# Patient Record
Sex: FEMALE | Race: WHITE | NOT HISPANIC OR LATINO | Employment: OTHER | URBAN - METROPOLITAN AREA
[De-identification: names, ages, dates, MRNs, and addresses within clinical notes are randomized per-mention and may not be internally consistent; named-entity substitution may affect disease eponyms.]

---

## 2017-10-04 ENCOUNTER — OFFICE VISIT (OUTPATIENT)
Dept: CARDIOLOGY | Facility: CLINIC | Age: 72
End: 2017-10-04

## 2017-10-04 VITALS
DIASTOLIC BLOOD PRESSURE: 86 MMHG | WEIGHT: 181 LBS | BODY MASS INDEX: 30.9 KG/M2 | HEIGHT: 64 IN | HEART RATE: 71 BPM | SYSTOLIC BLOOD PRESSURE: 152 MMHG

## 2017-10-04 DIAGNOSIS — R06.02 SHORTNESS OF BREATH: ICD-10-CM

## 2017-10-04 DIAGNOSIS — I47.1 SVT (SUPRAVENTRICULAR TACHYCARDIA) (HCC): ICD-10-CM

## 2017-10-04 DIAGNOSIS — I49.3 PVC (PREMATURE VENTRICULAR CONTRACTION): Primary | ICD-10-CM

## 2017-10-04 DIAGNOSIS — R00.2 PALPITATIONS: ICD-10-CM

## 2017-10-04 PROCEDURE — 99204 OFFICE O/P NEW MOD 45 MIN: CPT | Performed by: INTERNAL MEDICINE

## 2017-10-04 RX ORDER — DICLOFENAC SODIUM 75 MG/1
75 TABLET, DELAYED RELEASE ORAL
COMMUNITY
End: 2019-08-23 | Stop reason: SDUPTHER

## 2017-10-04 RX ORDER — FLUTICASONE PROPIONATE 50 MCG
2 SPRAY, SUSPENSION (ML) NASAL
COMMUNITY
Start: 2013-02-22 | End: 2019-08-23 | Stop reason: SDUPTHER

## 2017-10-04 RX ORDER — LEVOTHYROXINE SODIUM 0.07 MG/1
75 TABLET ORAL
COMMUNITY
End: 2019-08-23 | Stop reason: SDUPTHER

## 2017-10-04 RX ORDER — CARVEDILOL 6.25 MG/1
6.25 TABLET ORAL
COMMUNITY
Start: 2016-05-27 | End: 2017-10-04 | Stop reason: SINTOL

## 2017-10-04 RX ORDER — FUROSEMIDE 20 MG/1
20 TABLET ORAL DAILY
COMMUNITY
End: 2019-08-23 | Stop reason: SDUPTHER

## 2017-10-19 PROCEDURE — 93000 ELECTROCARDIOGRAM COMPLETE: CPT | Performed by: INTERNAL MEDICINE

## 2017-10-19 NOTE — PROGRESS NOTES
Subjective:     Encounter Date:10/04/2017      Patient ID: Hillary Crenshaw is a 72 y.o. female.    Chief Complaint: dyspnea, SVT, PVC    History of Present Illness    Dear Dr. Muñoz,     I had the pleasure of seeing this patient in cardiac evaluation today.  As you well know, she is a adeel 72-year-old woman with history of hypothyroidism, hyperlipidemia, and hypertension.  She comes for evaluation for supraventricular tachycardia. The patient states that she has had episodic heart racing that can occur out of the blue. She was diagnosed with supraventricular tachycardia and was treated with carvedilol.  A Holter performed last year demonstrated frequent PVC's occurring 22% of the time. There were also some short runs of ventricular tachycardia and of SVT.     She is dyspneic all the time.  She has episodes of near syncope and syncope.  She has some lower extremity edema.  She suffers from sleep apnea.         Review of Systems   All other systems reviewed and are negative.    Family History   Problem Relation Age of Onset   • Breast cancer Mother    • Heart attack Father    • Alcohol abuse Sister    • Arthritis Sister    • Diabetes Sister    • Hyperlipidemia Sister    • Osteoporosis Sister    • Thyroid disease Sister    • Alcohol abuse Brother    • Hypertension Brother    • Hyperlipidemia Brother    • Lung cancer Brother    • Heart attack Brother    • Bipolar disorder Maternal Grandmother    • Breast cancer Maternal Grandmother    • Depression Maternal Grandmother    • Other Other      cardiac disorder, neoplasm of breast     Social History   Substance Use Topics   • Smoking status: Former Smoker   • Smokeless tobacco: None   • Alcohol use Yes      Comment: 7 drinks week         ECG 12 Lead  Date/Time: 10/19/2017 9:40 AM  Performed by: FRANCISCA SELLERS  Authorized by: FRANCISCA SELLERS   Comparison: compared with previous ECG   Similar to previous ECG  Rhythm: sinus rhythm  Ectopy: bigeminy  BPM: 71                  Objective:     Physical Exam   Constitutional: She is oriented to person, place, and time. She appears well-developed and well-nourished.   HENT:   Head: Normocephalic and atraumatic.   Neck: Normal range of motion. Neck supple.   Cardiovascular: Normal rate, regular rhythm and normal heart sounds.    Pulmonary/Chest: Effort normal and breath sounds normal.   Abdominal: Soft. Bowel sounds are normal.   Musculoskeletal: Normal range of motion.   Neurological: She is alert and oriented to person, place, and time.   Skin: Skin is warm and dry.   Psychiatric: She has a normal mood and affect. Her behavior is normal. Thought content normal.   Vitals reviewed.      Lab Review:       Assessment:          Diagnosis Plan   1. PVC (premature ventricular contraction)  Adult Transthoracic Echo Complete W/ Cont if Necessary Per Protocol    Stress Test With Myocardial Perfusion One Day   2. SVT (supraventricular tachycardia)  Adult Transthoracic Echo Complete W/ Cont if Necessary Per Protocol    Stress Test With Myocardial Perfusion One Day   3. Palpitations  Adult Transthoracic Echo Complete W/ Cont if Necessary Per Protocol    Stress Test With Myocardial Perfusion One Day   4. Shortness of breath  Adult Transthoracic Echo Complete W/ Cont if Necessary Per Protocol    Stress Test With Myocardial Perfusion One Day          Plan:       It was a pleasure to see your patient in cardiac evaluation today.  She is a adeel 72-year-old woman with very frequent premature ventricular contractions as well as some ventricular tachycardia and SVT.  She has palpitations that bother her.  She has significant dyspnea and some episodes of presyncope and/or syncope.      In order to evaluate her cardiac structure I have recommended an echocardiogram as well as a pharmacologic nuclear stress test.  Once I get a sense whether this is purely an arrhythmic problem versus a structural cardiac problem, we can decide on the best course of action.  She  will see me again in one month.

## 2017-10-25 ENCOUNTER — HOSPITAL ENCOUNTER (OUTPATIENT)
Dept: CARDIOLOGY | Facility: HOSPITAL | Age: 72
Discharge: HOME | End: 2017-10-25
Attending: INTERNAL MEDICINE | Admitting: INTERNAL MEDICINE

## 2017-10-25 ENCOUNTER — HOSPITAL ENCOUNTER (OUTPATIENT)
Dept: CARDIOLOGY | Facility: HOSPITAL | Age: 72
Discharge: HOME | End: 2017-10-25
Attending: INTERNAL MEDICINE

## 2017-10-25 VITALS
HEART RATE: 64 BPM | WEIGHT: 180 LBS | BODY MASS INDEX: 30.73 KG/M2 | OXYGEN SATURATION: 95 % | HEIGHT: 64 IN | SYSTOLIC BLOOD PRESSURE: 156 MMHG | DIASTOLIC BLOOD PRESSURE: 70 MMHG

## 2017-10-25 DIAGNOSIS — I49.3 PVC (PREMATURE VENTRICULAR CONTRACTION): ICD-10-CM

## 2017-10-25 DIAGNOSIS — R06.02 SHORTNESS OF BREATH: ICD-10-CM

## 2017-10-25 DIAGNOSIS — R00.2 PALPITATIONS: ICD-10-CM

## 2017-10-25 DIAGNOSIS — I47.1 SVT (SUPRAVENTRICULAR TACHYCARDIA) (HCC): ICD-10-CM

## 2017-10-25 LAB
ASCENDING AORTA: 3 CM
BH CV ECHO MEAS - ACS: 2.2 CM
BH CV ECHO MEAS - AO MAX PG (FULL): 3.9 MMHG
BH CV ECHO MEAS - AO MAX PG: 5.8 MMHG
BH CV ECHO MEAS - AO MEAN PG (FULL): 1.6 MMHG
BH CV ECHO MEAS - AO MEAN PG: 2.6 MMHG
BH CV ECHO MEAS - AO ROOT AREA (BSA CORRECTED): 1.7
BH CV ECHO MEAS - AO ROOT AREA: 7.5 CM^2
BH CV ECHO MEAS - AO ROOT DIAM: 3.1 CM
BH CV ECHO MEAS - AO V2 MAX: 120.5 CM/SEC
BH CV ECHO MEAS - AO V2 MEAN: 70.4 CM/SEC
BH CV ECHO MEAS - AO V2 VTI: 25.6 CM
BH CV ECHO MEAS - AVA(I,A): 2.3 CM^2
BH CV ECHO MEAS - AVA(I,D): 2.3 CM^2
BH CV ECHO MEAS - AVA(V,A): 1.8 CM^2
BH CV ECHO MEAS - AVA(V,D): 1.8 CM^2
BH CV ECHO MEAS - BSA(HAYCOCK): 1.9 M^2
BH CV ECHO MEAS - BSA: 1.9 M^2
BH CV ECHO MEAS - BZI_BMI: 30.9 KILOGRAMS/M^2
BH CV ECHO MEAS - BZI_METRIC_HEIGHT: 162.6 CM
BH CV ECHO MEAS - BZI_METRIC_WEIGHT: 81.6 KG
BH CV ECHO MEAS - CONTRAST EF (2CH): 55.6 ML/M^2
BH CV ECHO MEAS - CONTRAST EF 4CH: 62.2 ML/M^2
BH CV ECHO MEAS - EDV(MOD-SP2): 72 ML
BH CV ECHO MEAS - EDV(MOD-SP4): 98 ML
BH CV ECHO MEAS - EDV(TEICH): 111.1 ML
BH CV ECHO MEAS - EF(CUBED): 71 %
BH CV ECHO MEAS - EF(MOD-SP2): 55.6 %
BH CV ECHO MEAS - EF(MOD-SP4): 62.2 %
BH CV ECHO MEAS - EF(TEICH): 62.5 %
BH CV ECHO MEAS - ESV(MOD-SP2): 32 ML
BH CV ECHO MEAS - ESV(MOD-SP4): 37 ML
BH CV ECHO MEAS - ESV(TEICH): 41.6 ML
BH CV ECHO MEAS - FS: 33.8 %
BH CV ECHO MEAS - IVS/LVPW: 1
BH CV ECHO MEAS - IVSD: 0.97 CM
BH CV ECHO MEAS - LAT PEAK E' VEL: 10 CM/SEC
BH CV ECHO MEAS - LV DIASTOLIC VOL/BSA (35-75): 52.4 ML/M^2
BH CV ECHO MEAS - LV MASS(C)D: 162.2 GRAMS
BH CV ECHO MEAS - LV MASS(C)DI: 86.7 GRAMS/M^2
BH CV ECHO MEAS - LV MAX PG: 1.9 MMHG
BH CV ECHO MEAS - LV MEAN PG: 1 MMHG
BH CV ECHO MEAS - LV SYSTOLIC VOL/BSA (12-30): 19.8 ML/M^2
BH CV ECHO MEAS - LV V1 MAX: 69.4 CM/SEC
BH CV ECHO MEAS - LV V1 MEAN: 47 CM/SEC
BH CV ECHO MEAS - LV V1 VTI: 19.4 CM
BH CV ECHO MEAS - LVIDD: 4.9 CM
BH CV ECHO MEAS - LVIDS: 3.2 CM
BH CV ECHO MEAS - LVLD AP2: 6.7 CM
BH CV ECHO MEAS - LVLD AP4: 6.9 CM
BH CV ECHO MEAS - LVLS AP2: 5.8 CM
BH CV ECHO MEAS - LVLS AP4: 5.6 CM
BH CV ECHO MEAS - LVOT AREA (M): 3.1 CM^2
BH CV ECHO MEAS - LVOT AREA: 3.1 CM^2
BH CV ECHO MEAS - LVOT DIAM: 2 CM
BH CV ECHO MEAS - LVPWD: 0.93 CM
BH CV ECHO MEAS - MED PEAK E' VEL: 6 CM/SEC
BH CV ECHO MEAS - MR MAX PG: 80.1 MMHG
BH CV ECHO MEAS - MR MAX VEL: 447.4 CM/SEC
BH CV ECHO MEAS - MV A DUR: 0.18 SEC
BH CV ECHO MEAS - MV A MAX VEL: 82 CM/SEC
BH CV ECHO MEAS - MV DEC SLOPE: 289.9 CM/SEC^2
BH CV ECHO MEAS - MV DEC TIME: 0.21 SEC
BH CV ECHO MEAS - MV E MAX VEL: 62.6 CM/SEC
BH CV ECHO MEAS - MV E/A: 0.76
BH CV ECHO MEAS - MV MAX PG: 2.5 MMHG
BH CV ECHO MEAS - MV MEAN PG: 0.74 MMHG
BH CV ECHO MEAS - MV P1/2T MAX VEL: 61.6 CM/SEC
BH CV ECHO MEAS - MV P1/2T: 62.2 MSEC
BH CV ECHO MEAS - MV V2 MAX: 78.3 CM/SEC
BH CV ECHO MEAS - MV V2 MEAN: 38.8 CM/SEC
BH CV ECHO MEAS - MV V2 VTI: 30.9 CM
BH CV ECHO MEAS - MVA P1/2T LCG: 3.6 CM^2
BH CV ECHO MEAS - MVA(P1/2T): 3.5 CM^2
BH CV ECHO MEAS - MVA(VTI): 1.9 CM^2
BH CV ECHO MEAS - PA ACC TIME: 0.13 SEC
BH CV ECHO MEAS - PA MAX PG (FULL): 2.6 MMHG
BH CV ECHO MEAS - PA MAX PG: 3.7 MMHG
BH CV ECHO MEAS - PA PR(ACCEL): 22 MMHG
BH CV ECHO MEAS - PA V2 MAX: 96.5 CM/SEC
BH CV ECHO MEAS - PULM DIAS VEL: 31.7 CM/SEC
BH CV ECHO MEAS - PULM S/D: 1.2
BH CV ECHO MEAS - PULM SYS VEL: 37.7 CM/SEC
BH CV ECHO MEAS - PVA(V,A): 1.6 CM^2
BH CV ECHO MEAS - PVA(V,D): 1.6 CM^2
BH CV ECHO MEAS - QP/QS: 0.49
BH CV ECHO MEAS - RAP SYSTOLE: 3 MMHG
BH CV ECHO MEAS - RV MAX PG: 1.2 MMHG
BH CV ECHO MEAS - RV MEAN PG: 0.5 MMHG
BH CV ECHO MEAS - RV V1 MAX: 53.8 CM/SEC
BH CV ECHO MEAS - RV V1 MEAN: 31.2 CM/SEC
BH CV ECHO MEAS - RV V1 VTI: 9.9 CM
BH CV ECHO MEAS - RVOT AREA: 2.9 CM^2
BH CV ECHO MEAS - RVOT DIAM: 1.9 CM
BH CV ECHO MEAS - RVSP: 35 MMHG
BH CV ECHO MEAS - SI(AO): 102.9 ML/M^2
BH CV ECHO MEAS - SI(CUBED): 43.8 ML/M^2
BH CV ECHO MEAS - SI(LVOT): 31.7 ML/M^2
BH CV ECHO MEAS - SI(MOD-SP2): 21.4 ML/M^2
BH CV ECHO MEAS - SI(MOD-SP4): 32.6 ML/M^2
BH CV ECHO MEAS - SI(TEICH): 37.1 ML/M^2
BH CV ECHO MEAS - SUP REN AO DIAM: 1.8 CM
BH CV ECHO MEAS - SV(AO): 192.4 ML
BH CV ECHO MEAS - SV(CUBED): 81.9 ML
BH CV ECHO MEAS - SV(LVOT): 59.4 ML
BH CV ECHO MEAS - SV(MOD-SP2): 40 ML
BH CV ECHO MEAS - SV(MOD-SP4): 61 ML
BH CV ECHO MEAS - SV(RVOT): 28.8 ML
BH CV ECHO MEAS - SV(TEICH): 69.5 ML
BH CV ECHO MEAS - TAPSE (>1.6): 1.9 CM2
BH CV ECHO MEAS - TR MAX VEL: 285.1 CM/SEC
BH CV NUCLEAR PRIOR STUDY: 3
BH CV STRESS BP STAGE 1: NORMAL
BH CV STRESS COMMENTS STAGE 1: NORMAL
BH CV STRESS DOSE REGADENOSON STAGE 1: 0.4
BH CV STRESS DURATION MIN STAGE 1: 0
BH CV STRESS DURATION SEC STAGE 1: 15
BH CV STRESS HR STAGE 1: 100
BH CV STRESS PROTOCOL 1: NORMAL
BH CV STRESS RECOVERY BP: NORMAL MMHG
BH CV STRESS RECOVERY HR: 79 BPM
BH CV STRESS STAGE 1: 1
BH CV XLRA - RV BASE: 3.1 CM
BH CV XLRA - TDI S': 11 CM/SEC
E/E' RATIO: 9
LEFT ATRIUM VOLUME INDEX: 39 ML/M2
LEFT ATRIUM VOLUME: 74 CM3
LV EF 2D ECHO EST: 62 %
LV EF NUC BP: 73 %
MAXIMAL PREDICTED HEART RATE: 148 BPM
PERCENT MAX PREDICTED HR: 67.57 %
SINUS: 2.9 CM
STJ: 2.5 CM
STRESS BASELINE BP: NORMAL MMHG
STRESS BASELINE HR: 77 BPM
STRESS PERCENT HR: 79 %
STRESS POST EXERCISE DUR SEC: 15 SEC
STRESS POST PEAK BP: NORMAL MMHG
STRESS POST PEAK HR: 100 BPM
STRESS TARGET HR: 126 BPM

## 2017-10-25 PROCEDURE — 93018 CV STRESS TEST I&R ONLY: CPT | Performed by: INTERNAL MEDICINE

## 2017-10-25 PROCEDURE — 0 TECHNETIUM TETROFOSMIN KIT: Performed by: INTERNAL MEDICINE

## 2017-10-25 PROCEDURE — 25010000002 PERFLUTREN (DEFINITY) 8.476 MG IN SODIUM CHLORIDE 0.9 % 10 ML INJECTION: Performed by: INTERNAL MEDICINE

## 2017-10-25 PROCEDURE — 78452 HT MUSCLE IMAGE SPECT MULT: CPT | Performed by: INTERNAL MEDICINE

## 2017-10-25 PROCEDURE — 25010000002 REGADENOSON 0.4 MG/5ML SOLUTION: Performed by: INTERNAL MEDICINE

## 2017-10-25 PROCEDURE — A9502 TC99M TETROFOSMIN: HCPCS | Performed by: INTERNAL MEDICINE

## 2017-10-25 PROCEDURE — 93306 TTE W/DOPPLER COMPLETE: CPT | Performed by: INTERNAL MEDICINE

## 2017-10-25 PROCEDURE — 93016 CV STRESS TEST SUPVJ ONLY: CPT | Performed by: INTERNAL MEDICINE

## 2017-10-25 PROCEDURE — 78452 HT MUSCLE IMAGE SPECT MULT: CPT

## 2017-10-25 PROCEDURE — 93017 CV STRESS TEST TRACING ONLY: CPT

## 2017-10-25 PROCEDURE — 93306 TTE W/DOPPLER COMPLETE: CPT

## 2017-10-25 RX ADMIN — PERFLUTREN 1.5 ML: 6.52 INJECTION, SUSPENSION INTRAVENOUS at 10:31

## 2017-10-25 RX ADMIN — TETROFOSMIN 1 DOSE: 1.38 INJECTION, POWDER, LYOPHILIZED, FOR SOLUTION INTRAVENOUS at 10:30

## 2017-10-25 RX ADMIN — REGADENOSON 0.4 MG: 0.08 INJECTION, SOLUTION INTRAVENOUS at 11:25

## 2017-10-25 RX ADMIN — TETROFOSMIN 1 DOSE: 1.38 INJECTION, POWDER, LYOPHILIZED, FOR SOLUTION INTRAVENOUS at 11:25

## 2017-11-01 ENCOUNTER — TELEPHONE (OUTPATIENT)
Dept: CARDIOLOGY | Facility: CLINIC | Age: 72
End: 2017-11-01

## 2017-11-01 NOTE — TELEPHONE ENCOUNTER
Dr. Caraballo patient calling for results of Stress & Echo done on 10/25.    Please call her at [phone].    Thank you.  Alberta

## 2017-11-01 NOTE — TELEPHONE ENCOUNTER
I informed the patient of her unremarkable stress test and echo, further discussion will be had at her appointment with Dr. Caraballo in November.

## 2017-11-08 ENCOUNTER — OFFICE VISIT (OUTPATIENT)
Dept: CARDIOLOGY | Facility: CLINIC | Age: 72
End: 2017-11-08

## 2017-11-08 VITALS
SYSTOLIC BLOOD PRESSURE: 140 MMHG | WEIGHT: 180 LBS | BODY MASS INDEX: 30.73 KG/M2 | HEART RATE: 81 BPM | HEIGHT: 64 IN | DIASTOLIC BLOOD PRESSURE: 80 MMHG

## 2017-11-08 DIAGNOSIS — I47.1 SVT (SUPRAVENTRICULAR TACHYCARDIA) (HCC): ICD-10-CM

## 2017-11-08 DIAGNOSIS — I49.3 PVC (PREMATURE VENTRICULAR CONTRACTION): ICD-10-CM

## 2017-11-08 DIAGNOSIS — R00.2 PALPITATIONS: Primary | ICD-10-CM

## 2017-11-08 PROCEDURE — 99213 OFFICE O/P EST LOW 20 MIN: CPT | Performed by: INTERNAL MEDICINE

## 2017-11-10 ENCOUNTER — TELEPHONE (OUTPATIENT)
Dept: CARDIOLOGY | Facility: CLINIC | Age: 72
End: 2017-11-10

## 2017-11-10 NOTE — TELEPHONE ENCOUNTER
Patient was seen by you on Weds, and states that you were going to send in a script for her, but the pharmacy hasn't received anything as of yet.    Your note isn't complete, so I don't know which medication she is referring to.    Please send in script or advise and I will gladly send in.    Thanks!

## 2017-11-12 PROCEDURE — 93000 ELECTROCARDIOGRAM COMPLETE: CPT | Performed by: INTERNAL MEDICINE

## 2017-11-12 RX ORDER — METOPROLOL SUCCINATE 50 MG/1
50 TABLET, EXTENDED RELEASE ORAL DAILY
Qty: 90 TABLET | Refills: 3 | Status: SHIPPED | OUTPATIENT
Start: 2017-11-12 | End: 2018-02-07 | Stop reason: ALTCHOICE

## 2017-11-12 NOTE — PROGRESS NOTES
Subjective:     Encounter Date:11/08/2017      Patient ID: Hillary Crenshaw is a 72 y.o. female.    Chief Complaint: PVC, SVT, palpitations    History of Present Illness    Dear Dr. Muñoz,     I had the pleasure of seeing this patient in cardiac follow-up today.  As you well know, she is a adeel 72-year-old woman with history of hypothyroidism, hyperlipidemia, and hypertension.  She was seen last month for SVT.  She had very frequent PVC's occurring 22% of the time.      She comes in for her 1-month follow-up. Since I have last seen her, she reports doing very well. She continues to have intermittent palpitations, but no evidence of syncope.    Her cardiac evaluation for structural heart disease was negative.  She has normal LV function and no significant structural abnormalities.  She had a normal pharmacologic nuclear stress test.     She complains of some intermittent fatigue, but otherwise no other complaints.        Review of Systems   All other systems reviewed and are negative.        ECG 12 Lead  Date/Time: 11/12/2017 4:13 PM  Performed by: FRANCISCA SELLERS  Authorized by: FRANCISCA SELLERS   Comparison: compared with previous ECG   Similar to previous ECG  Rhythm: sinus rhythm  Ectopy: frequent PVCs  BPM: 81                 Objective:     Physical Exam   Constitutional: She is oriented to person, place, and time. She appears well-developed and well-nourished.   HENT:   Head: Normocephalic and atraumatic.   Neck: Normal range of motion. Neck supple.   Cardiovascular: Normal rate and normal heart sounds.  An irregularly irregular rhythm present.   Pulmonary/Chest: Effort normal and breath sounds normal.   Abdominal: Soft. Bowel sounds are normal.   Musculoskeletal: Normal range of motion.   Neurological: She is alert and oriented to person, place, and time.   Skin: Skin is warm and dry.   Psychiatric: She has a normal mood and affect. Her behavior is normal. Thought content normal.   Vitals reviewed.      Lab  Review:       Assessment:          Diagnosis Plan   1. Palpitations     2. PVC (premature ventricular contraction)     3. SVT (supraventricular tachycardia)            Plan:       It was a pleasure to see your patient in cardiac follow-up today. She has very frequent PVC's, but no major symptoms other than fatigue.  I talked to her about multiple options for care.  I think the most conservative management would be to do nothing or to try suppressive therapy with a beta blocker. I do not think her symptoms are bad enough to warrant more aggressive therapy such as PVC ablation.    I will start her on Toprol-XL 50 mg once daily.  She will see me again in 6 months.

## 2018-01-02 ENCOUNTER — TELEPHONE (OUTPATIENT)
Dept: CARDIOLOGY | Facility: CLINIC | Age: 73
End: 2018-01-02

## 2018-01-02 NOTE — TELEPHONE ENCOUNTER
Patient called from [phone]    1.)Approximately 6 weeks ago louann was in afib and was started on metoprolol succ 50 mg daily.  She is having trouble breathing even walking up the stairs.  She is extremely short of breath.  She wanted to know if she can either decrease it or change?  She has trouble breathing with any kind of exertion.  It has not gotten any better.  She feels like she has a lot of weight around her heart.  She is a little swollen.  Denies Chest pain.  Still has the irregular heart beat, but not all the time.  Patient does not take bp and hr daily but states it stays around 127/80's.  Patient feels her quality of life is going down instead of up.    2.)She also wants to know if she is okay for hip surgery scheduled for March 12?    Thanks,  Cara

## 2018-01-02 NOTE — TELEPHONE ENCOUNTER
Spoke to patient and gave her an appointment for Feb 5.   I let her know to call if her symptoms worsen and she may be able to get in even sooner.    Cara

## 2018-02-07 ENCOUNTER — OFFICE VISIT (OUTPATIENT)
Dept: CARDIOLOGY | Facility: CLINIC | Age: 73
End: 2018-02-07

## 2018-02-07 VITALS
HEIGHT: 64 IN | WEIGHT: 180 LBS | DIASTOLIC BLOOD PRESSURE: 72 MMHG | BODY MASS INDEX: 30.73 KG/M2 | HEART RATE: 74 BPM | SYSTOLIC BLOOD PRESSURE: 132 MMHG

## 2018-02-07 DIAGNOSIS — R00.2 PALPITATIONS: Primary | ICD-10-CM

## 2018-02-07 DIAGNOSIS — I47.1 SVT (SUPRAVENTRICULAR TACHYCARDIA) (HCC): ICD-10-CM

## 2018-02-07 DIAGNOSIS — I49.3 PVC (PREMATURE VENTRICULAR CONTRACTION): ICD-10-CM

## 2018-02-07 PROCEDURE — 93000 ELECTROCARDIOGRAM COMPLETE: CPT | Performed by: INTERNAL MEDICINE

## 2018-02-07 PROCEDURE — 99213 OFFICE O/P EST LOW 20 MIN: CPT | Performed by: INTERNAL MEDICINE

## 2018-02-07 RX ORDER — DILTIAZEM HYDROCHLORIDE 240 MG/1
240 CAPSULE, COATED, EXTENDED RELEASE ORAL DAILY
Qty: 90 CAPSULE | Refills: 3 | Status: SHIPPED | OUTPATIENT
Start: 2018-02-07 | End: 2019-01-28 | Stop reason: SDUPTHER

## 2018-02-11 NOTE — PROGRESS NOTES
Subjective:     Encounter Date:02/07/2018      Patient ID: Hillary Crenshaw is a 72 y.o. female.    Chief Complaint: palpitations    History of Present Illness    Dear Dr. Muñoz,    I had the pleasure of seeing this patient in cardiac follow up today.  As you well know, she is a adeel 72-year-old woman with history of hypothyroidism and arrhythmia.  She has had SVT as well as frequent PVCs occurring about 22% of the time.  She had normal evaluation for structural heart disease including a stress test and echocardiogram.    She comes in for follow up.  She is looking at hip surgery with Dr. Kimble in early March.  She took metoprolol to suppress her PVCs, but thinks these are making her symptoms worse.        Review of Systems   All other systems reviewed and are negative.        ECG 12 Lead  Date/Time: 2/7/2018 2:36 PM  Performed by: FRANCISCA SELLERS  Authorized by: FRANCISCA SELLERS   Comparison: compared with previous ECG   Similar to previous ECG  Rhythm: sinus rhythm  Ectopy: bigeminy  BPM: 74                 Objective:     Physical Exam   Constitutional: She is oriented to person, place, and time. She appears well-developed and well-nourished.   HENT:   Head: Normocephalic and atraumatic.   Neck: Normal range of motion. Neck supple.   Cardiovascular: Normal rate and normal heart sounds.  A regularly irregular rhythm present.   Pulmonary/Chest: Effort normal and breath sounds normal.   Abdominal: Soft. Bowel sounds are normal.   Musculoskeletal: Normal range of motion.   Neurological: She is alert and oriented to person, place, and time.   Skin: Skin is warm and dry.   Psychiatric: She has a normal mood and affect. Her behavior is normal. Thought content normal.   Vitals reviewed.      Lab Review:       Assessment:          Diagnosis Plan   1. Palpitations     2. PVC (premature ventricular contraction)     3. SVT (supraventricular tachycardia)            Plan:       It was a pleasure to see your patient in  cardiac follow up today.  She is doing very well from a cardiac standpoint.  As she did not tolerate her metoprolol, I have switched her to diltiazem.  Hopefully she will have a better response to this medication.  I have urged her to try to not worry very much about this arrhythmia, as it seems benign.  Treatment should only be focused on symptom relief.    I see no problem with her proceeding with her upcoming hip surgery.

## 2018-05-14 ENCOUNTER — OFFICE VISIT (OUTPATIENT)
Dept: CARDIOLOGY | Facility: CLINIC | Age: 73
End: 2018-05-14

## 2018-05-14 VITALS
WEIGHT: 185 LBS | OXYGEN SATURATION: 99 % | HEART RATE: 60 BPM | BODY MASS INDEX: 31.58 KG/M2 | SYSTOLIC BLOOD PRESSURE: 124 MMHG | DIASTOLIC BLOOD PRESSURE: 82 MMHG | HEIGHT: 64 IN

## 2018-05-14 DIAGNOSIS — I49.3 PVC'S (PREMATURE VENTRICULAR CONTRACTIONS): Primary | ICD-10-CM

## 2018-05-14 PROBLEM — N81.11 MIDLINE CYSTOCELE: Status: ACTIVE | Noted: 2017-12-18

## 2018-05-14 PROBLEM — Z96.642 STATUS POST LEFT HIP REPLACEMENT: Status: ACTIVE | Noted: 2018-03-05

## 2018-05-14 PROBLEM — I10 HYPERTENSION: Status: ACTIVE | Noted: 2018-05-14

## 2018-05-14 PROBLEM — N81.6 RECTOCELE: Status: ACTIVE | Noted: 2017-12-18

## 2018-05-14 PROBLEM — E03.9 HYPOTHYROIDISM: Status: ACTIVE | Noted: 2018-05-14

## 2018-05-14 PROBLEM — M16.12 ARTHRITIS OF LEFT HIP: Status: ACTIVE | Noted: 2018-01-23

## 2018-05-14 PROBLEM — N81.2 INCOMPLETE UTEROVAGINAL PROLAPSE: Status: ACTIVE | Noted: 2017-12-18

## 2018-05-14 PROBLEM — N39.46 MIXED STRESS AND URGE URINARY INCONTINENCE: Status: ACTIVE | Noted: 2017-12-18

## 2018-05-14 PROBLEM — E66.9 OBESITY: Status: ACTIVE | Noted: 2017-12-18

## 2018-05-14 PROCEDURE — 99213 OFFICE O/P EST LOW 20 MIN: CPT | Performed by: PHYSICIAN ASSISTANT

## 2018-05-14 PROCEDURE — 93000 ELECTROCARDIOGRAM COMPLETE: CPT | Performed by: PHYSICIAN ASSISTANT

## 2018-05-14 NOTE — PROGRESS NOTES
Date of Office Visit: 2018  Encounter Provider: DONALD Thomson  Place of Service: [facility]  Patient Name: Hillary Crenshaw  :1945    Chief Complaint   Patient presents with   • Rapid Heart Rate     3 month follow up   • Hypertension   :     HPI: Hillary Crenshaw is a 72 y.o. female, new to me, who presents today for follow-up.  Old records have been obtained and reviewed by me.  She is a patient of Dr. Caraballo's with a past cardiac history significant for SVT and frequent PVCs.  She has been taking metoprolol to suppress her PVCs.  She has had a normal echocardiogram and a stress test in the past.  She was last in our office to see Dr. Caraballo on 2018.  At that visit, she was doing well from a cardiac standpoint.  She was not tolerating the metoprolol, and Dr. Caraballo switched her to diltiazem.  He also counseled her on the benign etiology of her PVCs.  She was facing upcoming hip surgery, and he felt it was safe for her to proceed.   She states that prior to her hip surgery in a few weeks after her hip surgery her palpitations were really bad.  However, once the pain of her hip subsided, her palpitations improved significantly.  She loves being on the Cardizem instead of the metoprolol, and states that she can hardly feel like she is on any medication at all.  She denies any chest pain, palpitations, shortness of breath, dizziness, or syncope.  She does complain of some swelling in her feet and around her ankles over the past week or so.      Past Medical History:   Diagnosis Date   • Acute allergic rhinitis    • Acute ankle pain    • Acute arthritis    • Asymptomatic PVCs    • Boil, thigh    • Chest pain    • Chronic constipation    • Essential hypertension, benign    • Gastritis    • Head ache    • Hyperlipidemia    • Hypervitaminosis D    • Hypothyroidism    • Osteoarthritis     left hip   • Osteopenia    • Palpitations    • Pessary maintenance    •  Post-menopause    • Prolapse of female bladder, acquired    • PSVT (paroxysmal supraventricular tachycardia)    • Right foot pain    • SOB (shortness of breath)    • Visit for screening mammogram        Past Surgical History:   Procedure Laterality Date   • FOOT SURGERY         Social History     Social History   • Marital status:      Spouse name: N/A   • Number of children: N/A   • Years of education: N/A     Occupational History   • Not on file.     Social History Main Topics   • Smoking status: Former Smoker   • Smokeless tobacco: Never Used   • Alcohol use 0.6 oz/week     1 Glasses of wine per week      Comment: 7 drinks week   • Drug use: No   • Sexual activity: Defer     Other Topics Concern   • Not on file     Social History Narrative   • No narrative on file       Family History   Problem Relation Age of Onset   • Breast cancer Mother    • Heart attack Father    • Alcohol abuse Sister    • Arthritis Sister    • Diabetes Sister    • Hyperlipidemia Sister    • Osteoporosis Sister    • Thyroid disease Sister    • Alcohol abuse Brother    • Hypertension Brother    • Hyperlipidemia Brother    • Lung cancer Brother    • Heart attack Brother    • Bipolar disorder Maternal Grandmother    • Breast cancer Maternal Grandmother    • Depression Maternal Grandmother    • Other Other      cardiac disorder, neoplasm of breast   • No Known Problems Maternal Grandfather    • No Known Problems Paternal Grandmother    • No Known Problems Paternal Grandfather        Review of Systems   Constitution: Negative for chills, fever and malaise/fatigue.   Cardiovascular: Positive for leg swelling. Negative for chest pain, dyspnea on exertion, near-syncope, orthopnea, palpitations, paroxysmal nocturnal dyspnea and syncope.   Respiratory: Negative for cough and shortness of breath.    Musculoskeletal: Negative for joint pain, joint swelling and myalgias.   Gastrointestinal: Negative for abdominal pain, diarrhea, melena, nausea  "and vomiting.   Genitourinary: Negative for frequency and hematuria.   Neurological: Negative for light-headedness, numbness, paresthesias and seizures.   Allergic/Immunologic: Negative.    All other systems reviewed and are negative.      No Known Allergies      Current Outpatient Prescriptions:   •  aspirin 81 MG tablet, Take 81 mg by mouth., Disp: , Rfl:   •  diclofenac (VOLTAREN) 75 MG EC tablet, Take 75 mg by mouth., Disp: , Rfl:   •  diltiaZEM CD (CARDIZEM CD) 240 MG 24 hr capsule, Take 1 capsule by mouth Daily., Disp: 90 capsule, Rfl: 3  •  fluticasone (FLONASE) 50 MCG/ACT nasal spray, 2 sprays into each nostril., Disp: , Rfl:   •  furosemide (LASIX) 20 MG tablet, Take 20 mg by mouth Daily., Disp: , Rfl:   •  levothyroxine (SYNTHROID, LEVOTHROID) 75 MCG tablet, Take 75 mcg by mouth., Disp: , Rfl:       Objective:     Vitals:    05/14/18 1351 05/14/18 1414   BP: 124/70 124/82   BP Location: Right arm Left arm   Pulse: 60    SpO2: 99%    Weight: 83.9 kg (185 lb)    Height: 162.6 cm (64\")      Body mass index is 31.76 kg/m².    PHYSICAL EXAM:    Physical Exam   Constitutional: She is oriented to person, place, and time. She appears well-developed and well-nourished. No distress.   HENT:   Head: Normocephalic and atraumatic.   Eyes: Pupils are equal, round, and reactive to light.   Neck: No JVD present. No thyromegaly present.   Cardiovascular: Normal rate, regular rhythm, normal heart sounds and intact distal pulses.    No murmur heard.  1+ bilateral lower extremity edema.   Pulmonary/Chest: Effort normal and breath sounds normal. No respiratory distress.   Abdominal: Soft. Bowel sounds are normal. She exhibits no distension. There is no splenomegaly or hepatomegaly. There is no tenderness.   Musculoskeletal: Normal range of motion. She exhibits no edema.   Neurological: She is alert and oriented to person, place, and time.   Skin: Skin is warm and dry. She is not diaphoretic. No erythema.   Psychiatric: She has " a normal mood and affect. Her behavior is normal. Judgment normal.         ECG 12 Lead  Date/Time: 5/14/2018 2:23 PM  Performed by: NAIN MILLS.  Authorized by: NAIN MILLS   Comparison: compared with previous ECG from 2/7/2018  Similar to previous ECG  Rhythm: sinus rhythm  Ectopy: frequent PVCs  BPM: 60  Clinical impression: abnormal ECG  Comments: Indication: PVCs.              Assessment:       Diagnosis Plan   1. PVC's (premature ventricular contractions)  ECG 12 Lead     Orders Placed This Encounter   Procedures   • ECG 12 Lead     This order was created via procedure documentation          Plan:       Overall she stable and doing well from a cardiac standpoint.  She has had an unremarkable cardiac workup in the past, and seems to be tolerating the Cardizem much better than the metoprolol.  She is no longer bothered by her PVCs.  Her only real complaint today is leg swelling.  I advised her to continue to watch her sodium intake.  She has no calf tenderness and is very active, I am at a low suspicion for DVT.  I'm not make any changes to her medical regimen, and she will follow-up with Dr. Caraballo in 1 year or sooner if needed.    As always, it has been a pleasure to participate in your patient's care.      Sincerely,         Nain Mills PA-C

## 2018-10-08 ENCOUNTER — TELEPHONE (OUTPATIENT)
Dept: CARDIOLOGY | Facility: CLINIC | Age: 73
End: 2018-10-08

## 2018-10-08 NOTE — TELEPHONE ENCOUNTER
Patient would like to speak with you directly, about her current cardiac status, and whether or not it is ok for her to have surgery.  She has tumors behind uterus, and is currently scheduled for surgery on 10/22 with Dr. Arash King at [facility].    She is concerned because she also had hip surgery back in March.    Please call her at [phone] to discuss.    Thank you!      *Patient is aware that Dr. Caraballo will not be back in the office until 10/10 and he will call her then.

## 2018-10-10 NOTE — TELEPHONE ENCOUNTER
Dr. Caraballo called and spoke at length with patient.  He has cleared her for this surgery, and if they request it, she may hold aspirin 5 days prior.

## 2019-01-28 RX ORDER — DILTIAZEM HYDROCHLORIDE 240 MG/1
CAPSULE, EXTENDED RELEASE ORAL
Qty: 90 CAPSULE | Refills: 2 | Status: SHIPPED | OUTPATIENT
Start: 2019-01-28 | End: 2019-08-23 | Stop reason: SDUPTHER

## 2019-05-15 ENCOUNTER — OFFICE VISIT (OUTPATIENT)
Dept: CARDIOLOGY | Facility: CLINIC | Age: 74
End: 2019-05-15

## 2019-05-15 VITALS
DIASTOLIC BLOOD PRESSURE: 74 MMHG | SYSTOLIC BLOOD PRESSURE: 134 MMHG | WEIGHT: 190 LBS | BODY MASS INDEX: 32.44 KG/M2 | HEIGHT: 64 IN | HEART RATE: 62 BPM

## 2019-05-15 DIAGNOSIS — I47.1 SVT (SUPRAVENTRICULAR TACHYCARDIA) (HCC): ICD-10-CM

## 2019-05-15 DIAGNOSIS — I49.3 PVC'S (PREMATURE VENTRICULAR CONTRACTIONS): ICD-10-CM

## 2019-05-15 DIAGNOSIS — R09.89 CAROTID BRUIT, UNSPECIFIED LATERALITY: Primary | ICD-10-CM

## 2019-05-15 PROCEDURE — 99213 OFFICE O/P EST LOW 20 MIN: CPT | Performed by: INTERNAL MEDICINE

## 2019-05-15 PROCEDURE — 93000 ELECTROCARDIOGRAM COMPLETE: CPT | Performed by: INTERNAL MEDICINE

## 2019-05-15 NOTE — PROGRESS NOTES
Subjective:     Encounter Date:05/15/2019      Patient ID: Hillary Crenshaw is a 73 y.o. female.    Chief Complaint: SVT, PVC, carotid artery bruit    History of Present Illness    Dear Dr. Muñoz,     I had the pleasure of seeing your patient in cardiac follow-up today.  As you well know, she is a adeel, 73-year-old woman with history of SVT and PVCs.  She was previously on metoprolol, but did not tolerate it.  She changed to diltiazem which she likes much better.  She continues to have arrhythmia, but she says she does not feel it very much.        Her  has developed dementia.  She is very stressed about this.  She is afraid of having a stroke.  She has not been exercising as much as she used to and has been gaining weight.            Review of Systems   All other systems reviewed and are negative.        ECG 12 Lead  Date/Time: 5/15/2019 10:23 AM  Performed by: Phuc Caraballo MD  Authorized by: Phuc Caraballo MD   Comparison: compared with previous ECG   Similar to previous ECG  Rhythm: sinus rhythm  Ectopy: trigeminy  BPM: 62                 Objective:     Physical Exam   Constitutional: She is oriented to person, place, and time. She appears well-developed and well-nourished.   HENT:   Head: Normocephalic and atraumatic.   Neck: Normal range of motion. Neck supple.   Cardiovascular: Normal rate, regular rhythm and normal heart sounds.   Pulmonary/Chest: Effort normal and breath sounds normal.   Abdominal: Soft. Bowel sounds are normal.   Musculoskeletal: Normal range of motion.   Neurological: She is alert and oriented to person, place, and time.   Skin: Skin is warm and dry.   Psychiatric: She has a normal mood and affect. Her behavior is normal. Thought content normal.   Vitals reviewed.      Lab Review:       Assessment:          Diagnosis Plan   1. Carotid bruit, unspecified laterality  Duplex Carotid Ultrasound CAR   2. PVC's (premature ventricular contractions)     3. SVT (supraventricular  tachycardia) (CMS/[facility])            Plan:       It was a pleasure to see your patient in cardiac followup today.  She is doing well from the cardiac standpoint without any complaints.  She is under a lot of stress due to her 's dementia.  She has a small carotid bruit so we are going to check a carotid Doppler study.  She will see us again in one year or sooner if symptoms warrant.

## 2019-06-03 ENCOUNTER — HOSPITAL ENCOUNTER (OUTPATIENT)
Dept: CARDIOLOGY | Facility: HOSPITAL | Age: 74
Discharge: HOME | End: 2019-06-03
Admitting: INTERNAL MEDICINE

## 2019-06-03 DIAGNOSIS — R09.89 CAROTID BRUIT, UNSPECIFIED LATERALITY: ICD-10-CM

## 2019-06-03 LAB
BH CV XLRA MEAS LEFT DIST CCA EDV: -12.6 CM/SEC
BH CV XLRA MEAS LEFT DIST CCA PSV: -49.7 CM/SEC
BH CV XLRA MEAS LEFT DIST ICA EDV: -30.1 CM/SEC
BH CV XLRA MEAS LEFT DIST ICA PSV: -83.4 CM/SEC
BH CV XLRA MEAS LEFT ICA/CCA RATIO: 1.7
BH CV XLRA MEAS LEFT MID CCA EDV: -18.9 CM/SEC
BH CV XLRA MEAS LEFT MID CCA PSV: -86.2 CM/SEC
BH CV XLRA MEAS LEFT MID ICA EDV: -28 CM/SEC
BH CV XLRA MEAS LEFT MID ICA PSV: -73.6 CM/SEC
BH CV XLRA MEAS LEFT PROX CCA EDV: 14.2 CM/SEC
BH CV XLRA MEAS LEFT PROX CCA PSV: 71.6 CM/SEC
BH CV XLRA MEAS LEFT PROX ECA PSV: -52.8 CM/SEC
BH CV XLRA MEAS LEFT PROX ICA EDV: -11.8 CM/SEC
BH CV XLRA MEAS LEFT PROX ICA PSV: -30.2 CM/SEC
BH CV XLRA MEAS LEFT PROX SCLA PSV: 83.4 CM/SEC
BH CV XLRA MEAS LEFT VERTEBRAL A PSV: -35.4 CM/SEC
BH CV XLRA MEAS RIGHT DIST CCA EDV: 18.6 CM/SEC
BH CV XLRA MEAS RIGHT DIST CCA PSV: 53.4 CM/SEC
BH CV XLRA MEAS RIGHT DIST ICA EDV: -24.6 CM/SEC
BH CV XLRA MEAS RIGHT DIST ICA PSV: -66.4 CM/SEC
BH CV XLRA MEAS RIGHT ICA/CCA RATIO: 1.2
BH CV XLRA MEAS RIGHT MID CCA EDV: 20.9 CM/SEC
BH CV XLRA MEAS RIGHT MID CCA PSV: 87.8 CM/SEC
BH CV XLRA MEAS RIGHT MID ICA EDV: -18.1 CM/SEC
BH CV XLRA MEAS RIGHT MID ICA PSV: -64.1 CM/SEC
BH CV XLRA MEAS RIGHT PROX CCA EDV: -20.1 CM/SEC
BH CV XLRA MEAS RIGHT PROX CCA PSV: -83.8 CM/SEC
BH CV XLRA MEAS RIGHT PROX ECA PSV: -60.3 CM/SEC
BH CV XLRA MEAS RIGHT PROX ICA EDV: -12.5 CM/SEC
BH CV XLRA MEAS RIGHT PROX ICA PSV: -35.3 CM/SEC
BH CV XLRA MEAS RIGHT PROX SCLA PSV: 40.8 CM/SEC
BH CV XLRA MEAS RIGHT VERTEBRAL A PSV: -42.3 CM/SEC

## 2019-06-03 PROCEDURE — 93880 EXTRACRANIAL BILAT STUDY: CPT | Performed by: INTERNAL MEDICINE

## 2019-06-03 PROCEDURE — 93880 EXTRACRANIAL BILAT STUDY: CPT

## 2019-06-13 ENCOUNTER — TELEPHONE (OUTPATIENT)
Dept: CARDIOLOGY | Facility: CLINIC | Age: 74
End: 2019-06-13

## 2019-06-13 NOTE — TELEPHONE ENCOUNTER
Pt called wanting the results of her carotid dopper done on 6/3/19. It is in [facility] and she can be reached at #[phone].    Thanks,  Kate

## 2019-07-22 ENCOUNTER — TELEPHONE (OUTPATIENT)
Dept: FAMILY MEDICINE CLINIC | Facility: CLINIC | Age: 74
End: 2019-07-22

## 2019-07-25 ENCOUNTER — TELEPHONE (OUTPATIENT)
Dept: FAMILY MEDICINE CLINIC | Facility: CLINIC | Age: 74
End: 2019-07-25

## 2019-08-23 ENCOUNTER — OFFICE VISIT (OUTPATIENT)
Dept: FAMILY MEDICINE CLINIC | Facility: CLINIC | Age: 74
End: 2019-08-23

## 2019-08-23 VITALS
BODY MASS INDEX: 31.99 KG/M2 | HEIGHT: 64 IN | TEMPERATURE: 97.3 F | HEART RATE: 58 BPM | DIASTOLIC BLOOD PRESSURE: 72 MMHG | SYSTOLIC BLOOD PRESSURE: 112 MMHG | WEIGHT: 187.4 LBS | OXYGEN SATURATION: 98 %

## 2019-08-23 DIAGNOSIS — I10 ESSENTIAL HYPERTENSION: Primary | ICD-10-CM

## 2019-08-23 DIAGNOSIS — E03.9 ACQUIRED HYPOTHYROIDISM: ICD-10-CM

## 2019-08-23 DIAGNOSIS — M16.12 ARTHRITIS OF LEFT HIP: ICD-10-CM

## 2019-08-23 DIAGNOSIS — J30.2 SEASONAL ALLERGIC RHINITIS, UNSPECIFIED TRIGGER: ICD-10-CM

## 2019-08-23 DIAGNOSIS — I48.91 ATRIAL FIBRILLATION, UNSPECIFIED TYPE (HCC): ICD-10-CM

## 2019-08-23 DIAGNOSIS — J06.9 ACUTE UPPER RESPIRATORY INFECTION: ICD-10-CM

## 2019-08-23 PROBLEM — N94.89 ENDOMETRIAL MASS: Status: ACTIVE | Noted: 2018-09-17

## 2019-08-23 PROCEDURE — 99214 OFFICE O/P EST MOD 30 MIN: CPT | Performed by: FAMILY MEDICINE

## 2019-08-23 RX ORDER — FLUTICASONE PROPIONATE 50 MCG
2 SPRAY, SUSPENSION (ML) NASAL DAILY
Qty: 3 BOTTLE | Refills: 3 | Status: SHIPPED | OUTPATIENT
Start: 2019-08-23 | End: 2020-06-12 | Stop reason: SDUPTHER

## 2019-08-23 RX ORDER — DILTIAZEM HYDROCHLORIDE 240 MG/1
240 CAPSULE, COATED, EXTENDED RELEASE ORAL DAILY
Qty: 90 CAPSULE | Refills: 3 | Status: SHIPPED | OUTPATIENT
Start: 2019-08-23 | End: 2020-10-16

## 2019-08-23 RX ORDER — DICLOFENAC SODIUM 75 MG/1
75 TABLET, DELAYED RELEASE ORAL 2 TIMES DAILY
Qty: 180 TABLET | Refills: 3 | Status: SHIPPED | OUTPATIENT
Start: 2019-08-23 | End: 2020-10-16

## 2019-08-23 RX ORDER — LEVOTHYROXINE SODIUM 0.07 MG/1
75 TABLET ORAL DAILY
Qty: 90 TABLET | Refills: 3 | Status: SHIPPED | OUTPATIENT
Start: 2019-08-23 | End: 2020-10-16

## 2019-08-23 RX ORDER — FUROSEMIDE 20 MG/1
20 TABLET ORAL DAILY
Qty: 90 TABLET | Refills: 3 | Status: SHIPPED | OUTPATIENT
Start: 2019-08-23 | End: 2020-11-25

## 2019-08-23 RX ORDER — AMOXICILLIN 500 MG/1
500 CAPSULE ORAL 3 TIMES DAILY
Qty: 30 CAPSULE | Refills: 0 | Status: SHIPPED | OUTPATIENT
Start: 2019-08-23 | End: 2019-12-06

## 2019-08-23 NOTE — PROGRESS NOTES
Subjective   Hillary Crenshaw is a 74 y.o. female.     Chief Complaint   Patient presents with   • Thyroid Problem     Follow up        History of Present Illness   Hypothyroidism- doing well, had recent labs,     htn- doing well on meds    Allergies- stable on meds    afib- stable and following with cardiology    OA- stable on meds, hip doing well with replacement    Falls due to wearing high heels all the time.     Cough and congestion for 1 week and not resolving.       The following portions of the patient's history were reviewed and updated as appropriate: allergies, current medications, past family history, past medical history, past social history, past surgical history and problem list.    Past Medical History:   Diagnosis Date   • Abnormal stool test    • Acute allergic rhinitis    • Acute sinusitis    • Allergic rhinitis    • Ankle pain    • Arthritis    • Arthritis of left hip    • Asymptomatic PVCs    • Asymptomatic PVCs    • Atrial fibrillation (CMS/HCC)    • Boil, thigh    • Chest pain    • Chronic constipation    • Essential hypertension, benign    • Gastritis    • GERD (gastroesophageal reflux disease)    • Head ache    • Hyperlipidemia    • Hypervitaminosis D    • Hypothyroidism    • Lung nodule    • Osteoarthritis     left hip   • Osteopenia    • Palpitations    • Pessary maintenance    • Post-menopause    • Prolapse of female bladder, acquired    • PSVT (paroxysmal supraventricular tachycardia) (CMS/HCC)    • Rectal bleeding    • Right foot pain    • SOB (shortness of breath)    • Urine frequency    • UTI (urinary tract infection)    • Visit for screening mammogram        Past Surgical History:   Procedure Laterality Date   • FOOT SURGERY     • TOTAL HIP ARTHROPLASTY         Family History   Problem Relation Age of Onset   • Breast cancer Mother    • Heart attack Father    • Alcohol abuse Sister    • Arthritis Sister    • Diabetes Sister    • Hyperlipidemia Sister    • Osteoporosis Sister    •  "Thyroid disease Sister    • Alcohol abuse Brother    • Hypertension Brother    • Hyperlipidemia Brother    • Lung cancer Brother    • Heart attack Brother    • Bipolar disorder Maternal Grandmother    • Breast cancer Maternal Grandmother    • Depression Maternal Grandmother    • Other Other         cardiac disorder, neoplasm of breast   • No Known Problems Maternal Grandfather    • No Known Problems Paternal Grandmother    • No Known Problems Paternal Grandfather        Social History     Socioeconomic History   • Marital status:      Spouse name: Not on file   • Number of children: Not on file   • Years of education: Not on file   • Highest education level: Not on file   Tobacco Use   • Smoking status: Former Smoker   • Smokeless tobacco: Never Used   Substance and Sexual Activity   • Alcohol use: Yes     Alcohol/week: 0.6 oz     Types: 1 Glasses of wine per week     Comment: 7 drinks week   • Drug use: No   • Sexual activity: Defer       Review of Systems   Respiratory: Negative for shortness of breath.    Cardiovascular: Negative for chest pain.       Objective   Visit Vitals  /72   Pulse 58   Temp 97.3 °F (36.3 °C) (Temporal)   Ht 162.6 cm (64\")   Wt 85 kg (187 lb 6.4 oz)   SpO2 98%   BMI 32.17 kg/m²     Body mass index is 32.17 kg/m².  Physical Exam   Constitutional: She is oriented to person, place, and time. She appears well-developed and well-nourished.   Cardiovascular: Normal rate, regular rhythm, normal heart sounds and intact distal pulses.   Pulmonary/Chest: Effort normal and breath sounds normal.   Musculoskeletal: Normal range of motion. She exhibits no edema.   Neurological: She is alert and oriented to person, place, and time.   Skin: Skin is warm and dry.   Psychiatric: She has a normal mood and affect. Her behavior is normal.         Assessment/Plan   Hillary was seen today for thyroid problem.    Diagnoses and all orders for this visit:    Essential hypertension  -     furosemide " (LASIX) 20 MG tablet; Take 1 tablet by mouth Daily.    Arthritis of left hip  -     diclofenac (VOLTAREN) 75 MG EC tablet; Take 1 tablet by mouth 2 (Two) Times a Day.    Acquired hypothyroidism  -     levothyroxine (SYNTHROID, LEVOTHROID) 75 MCG tablet; Take 1 tablet by mouth Daily.    Atrial fibrillation, unspecified type (CMS/HCC)  -     diltiaZEM CD (CARTIA XT) 240 MG 24 hr capsule; Take 1 capsule by mouth Daily.    Seasonal allergic rhinitis, unspecified trigger  -     fluticasone (FLONASE) 50 MCG/ACT nasal spray; 2 sprays into the nostril(s) as directed by provider Daily.    Acute upper respiratory infection    Other orders  -     amoxicillin (AMOXIL) 500 MG capsule; Take 1 capsule by mouth 3 (Three) Times a Day.          Rest, fluids and follow up if worse or no better. Can try abx if this does not resolve.      stop wearing heels for falls.

## 2019-11-18 ENCOUNTER — TELEPHONE (OUTPATIENT)
Dept: FAMILY MEDICINE CLINIC | Facility: CLINIC | Age: 74
End: 2019-11-18

## 2019-11-18 DIAGNOSIS — E78.2 MIXED HYPERLIPIDEMIA: Primary | ICD-10-CM

## 2019-11-18 DIAGNOSIS — E03.9 ACQUIRED HYPOTHYROIDISM: ICD-10-CM

## 2019-11-18 NOTE — TELEPHONE ENCOUNTER
Patient has appt on 12/6/19 & would like to have blood work done prior to this appt, please call her if the order can be put in

## 2019-11-23 ENCOUNTER — RESULTS ENCOUNTER (OUTPATIENT)
Dept: FAMILY MEDICINE CLINIC | Facility: CLINIC | Age: 74
End: 2019-11-23

## 2019-11-23 DIAGNOSIS — E78.2 MIXED HYPERLIPIDEMIA: ICD-10-CM

## 2019-11-23 DIAGNOSIS — E03.9 ACQUIRED HYPOTHYROIDISM: ICD-10-CM

## 2019-12-06 ENCOUNTER — OFFICE VISIT (OUTPATIENT)
Dept: FAMILY MEDICINE CLINIC | Facility: CLINIC | Age: 74
End: 2019-12-06

## 2019-12-06 VITALS
HEIGHT: 64 IN | WEIGHT: 191 LBS | OXYGEN SATURATION: 98 % | TEMPERATURE: 98.6 F | BODY MASS INDEX: 32.61 KG/M2 | DIASTOLIC BLOOD PRESSURE: 70 MMHG | HEART RATE: 71 BPM | SYSTOLIC BLOOD PRESSURE: 122 MMHG

## 2019-12-06 DIAGNOSIS — I48.91 ATRIAL FIBRILLATION, UNSPECIFIED TYPE (HCC): ICD-10-CM

## 2019-12-06 DIAGNOSIS — E03.9 ACQUIRED HYPOTHYROIDISM: ICD-10-CM

## 2019-12-06 DIAGNOSIS — E78.2 MIXED HYPERLIPIDEMIA: ICD-10-CM

## 2019-12-06 DIAGNOSIS — M16.10 PRIMARY OSTEOARTHRITIS OF HIP, UNSPECIFIED LATERALITY: ICD-10-CM

## 2019-12-06 DIAGNOSIS — J30.2 SEASONAL ALLERGIC RHINITIS, UNSPECIFIED TRIGGER: ICD-10-CM

## 2019-12-06 DIAGNOSIS — I10 ESSENTIAL HYPERTENSION, BENIGN: Primary | ICD-10-CM

## 2019-12-06 PROCEDURE — 99214 OFFICE O/P EST MOD 30 MIN: CPT | Performed by: FAMILY MEDICINE

## 2019-12-06 RX ORDER — PNEUMOCOCCAL 13-VALENT CONJUGATE VACCINE 2.2; 2.2; 2.2; 2.2; 2.2; 4.4; 2.2; 2.2; 2.2; 2.2; 2.2; 2.2; 2.2 UG/.5ML; UG/.5ML; UG/.5ML; UG/.5ML; UG/.5ML; UG/.5ML; UG/.5ML; UG/.5ML; UG/.5ML; UG/.5ML; UG/.5ML; UG/.5ML; UG/.5ML
INJECTION, SUSPENSION INTRAMUSCULAR
Refills: 0 | COMMUNITY
Start: 2019-10-09 | End: 2019-12-06

## 2019-12-06 RX ORDER — FLUOROURACIL 50 MG/G
CREAM TOPICAL
Refills: 1 | COMMUNITY
Start: 2019-11-18 | End: 2020-05-06

## 2019-12-06 RX ORDER — INFLUENZA A VIRUS A/MICHIGAN/45/2015 X-275 (H1N1) ANTIGEN (FORMALDEHYDE INACTIVATED), INFLUENZA A VIRUS A/SINGAPORE/INFIMH-16-0019/2016 IVR-186 (H3N2) ANTIGEN (FORMALDEHYDE INACTIVATED), AND INFLUENZA B VIRUS B/MARYLAND/15/2016 BX-69A (A B/COLORADO/6/2017-LIKE VIRUS) ANTIGEN (FORMALDEHYDE INACTIVATED) 60; 60; 60 UG/.5ML; UG/.5ML; UG/.5ML
INJECTION, SUSPENSION INTRAMUSCULAR
Refills: 0 | COMMUNITY
Start: 2019-10-09 | End: 2019-12-06

## 2019-12-06 NOTE — PROGRESS NOTES
Subjective   Hillary Crenshaw is a 74 y.o. female.     Chief Complaint   Patient presents with   • Hypothyroidism     Follow up        History of Present Illness   Hypothyroidism- doing well, due labs, on meds,     htn/hld- doing well on meds    Allergies- stable on meds    afib- stable and following with cardiology    OA- stable on meds, hip doing well with replacement          The following portions of the patient's history were reviewed and updated as appropriate: allergies, current medications, past family history, past medical history, past social history, past surgical history and problem list.    Past Medical History:   Diagnosis Date   • Abnormal stool test    • Acute allergic rhinitis    • Acute sinusitis    • Allergic rhinitis    • Ankle pain    • Arthritis    • Arthritis of left hip    • Asymptomatic PVCs    • Asymptomatic PVCs    • Atrial fibrillation (CMS/HCC)    • Boil, thigh    • Chest pain    • Chronic constipation    • Essential hypertension, benign    • Gastritis    • GERD (gastroesophageal reflux disease)    • Head ache    • Hyperlipidemia    • Hypervitaminosis D    • Hypothyroidism    • Lung nodule    • Osteoarthritis     left hip   • Osteopenia    • Palpitations    • Pessary maintenance    • Post-menopause    • Prolapse of female bladder, acquired    • PSVT (paroxysmal supraventricular tachycardia) (CMS/HCC)    • Rectal bleeding    • Right foot pain    • SOB (shortness of breath)    • Urine frequency    • UTI (urinary tract infection)    • Visit for screening mammogram        Past Surgical History:   Procedure Laterality Date   • FOOT SURGERY     • TOTAL HIP ARTHROPLASTY         Family History   Problem Relation Age of Onset   • Breast cancer Mother    • Heart attack Father    • Alcohol abuse Sister    • Arthritis Sister    • Diabetes Sister    • Hyperlipidemia Sister    • Osteoporosis Sister    • Thyroid disease Sister    • Alcohol abuse Brother    • Hypertension Brother    • Hyperlipidemia  "Brother    • Lung cancer Brother    • Heart attack Brother    • Bipolar disorder Maternal Grandmother    • Breast cancer Maternal Grandmother    • Depression Maternal Grandmother    • Other Other         cardiac disorder, neoplasm of breast   • No Known Problems Maternal Grandfather    • No Known Problems Paternal Grandmother    • No Known Problems Paternal Grandfather        Social History     Socioeconomic History   • Marital status:      Spouse name: Not on file   • Number of children: Not on file   • Years of education: Not on file   • Highest education level: Not on file   Tobacco Use   • Smoking status: Former Smoker   • Smokeless tobacco: Never Used   Substance and Sexual Activity   • Alcohol use: Yes     Alcohol/week: 0.6 oz     Types: 1 Glasses of wine per week     Comment: 7 drinks week   • Drug use: No   • Sexual activity: Defer       Review of Systems   Respiratory: Negative for shortness of breath.    Cardiovascular: Negative for chest pain.       Objective   Visit Vitals  /70   Pulse 71   Temp 98.6 °F (37 °C) (Temporal)   Ht 162.6 cm (64\")   Wt 86.6 kg (191 lb)   SpO2 98%   BMI 32.79 kg/m²     Body mass index is 32.79 kg/m².  Physical Exam   Constitutional: She is oriented to person, place, and time. She appears well-developed and well-nourished.   Cardiovascular: Normal rate, regular rhythm, normal heart sounds and intact distal pulses.   Pulmonary/Chest: Effort normal and breath sounds normal.   Musculoskeletal: Normal range of motion. She exhibits no edema.   Neurological: She is alert and oriented to person, place, and time.   Skin: Skin is warm and dry.   Psychiatric: She has a normal mood and affect. Her behavior is normal.         Assessment/Plan   Hillary was seen today for hypothyroidism.    Diagnoses and all orders for this visit:    Essential hypertension, benign    Acquired hypothyroidism    Mixed hyperlipidemia    Atrial fibrillation, unspecified type (CMS/HCC)    Seasonal " allergic rhinitis, unspecified trigger    Primary osteoarthritis of hip, unspecified laterality        Stable, cont meds, f/u in 6 months. Labd today, CMP,lipids and tsh already ordered.

## 2019-12-07 LAB
ALBUMIN SERPL-MCNC: 4.5 G/DL (ref 3.5–5.2)
ALBUMIN/GLOB SERPL: 1.7 G/DL
ALP SERPL-CCNC: 120 U/L (ref 39–117)
ALT SERPL-CCNC: 16 U/L (ref 1–33)
AST SERPL-CCNC: 17 U/L (ref 1–32)
BILIRUB SERPL-MCNC: 0.3 MG/DL (ref 0.2–1.2)
BUN SERPL-MCNC: 15 MG/DL (ref 8–23)
BUN/CREAT SERPL: 19.2 (ref 7–25)
CALCIUM SERPL-MCNC: 10 MG/DL (ref 8.6–10.5)
CHLORIDE SERPL-SCNC: 102 MMOL/L (ref 98–107)
CHOLEST SERPL-MCNC: 261 MG/DL (ref 0–200)
CO2 SERPL-SCNC: 26.7 MMOL/L (ref 22–29)
CREAT SERPL-MCNC: 0.78 MG/DL (ref 0.57–1)
GLOBULIN SER CALC-MCNC: 2.7 GM/DL
GLUCOSE SERPL-MCNC: 99 MG/DL (ref 65–99)
HDLC SERPL-MCNC: 63 MG/DL (ref 40–60)
LDLC SERPL CALC-MCNC: 156 MG/DL (ref 0–100)
POTASSIUM SERPL-SCNC: 4.2 MMOL/L (ref 3.5–5.2)
PROT SERPL-MCNC: 7.2 G/DL (ref 6–8.5)
SODIUM SERPL-SCNC: 141 MMOL/L (ref 136–145)
TRIGL SERPL-MCNC: 209 MG/DL (ref 0–150)
TSH SERPL DL<=0.005 MIU/L-ACNC: 1.95 UIU/ML (ref 0.27–4.2)
VLDLC SERPL CALC-MCNC: 41.8 MG/DL

## 2020-02-04 ENCOUNTER — TELEPHONE (OUTPATIENT)
Dept: FAMILY MEDICINE CLINIC | Facility: CLINIC | Age: 75
End: 2020-02-04

## 2020-02-04 DIAGNOSIS — Z78.0 POST-MENOPAUSE: ICD-10-CM

## 2020-02-04 DIAGNOSIS — E78.2 MIXED HYPERLIPIDEMIA: ICD-10-CM

## 2020-02-04 DIAGNOSIS — E03.9 ACQUIRED HYPOTHYROIDISM: ICD-10-CM

## 2020-02-04 DIAGNOSIS — Z12.31 VISIT FOR SCREENING MAMMOGRAM: ICD-10-CM

## 2020-02-04 DIAGNOSIS — M85.80 OSTEOPENIA, UNSPECIFIED LOCATION: Primary | ICD-10-CM

## 2020-02-04 NOTE — TELEPHONE ENCOUNTER
I put the order for all her labs in.  I also put in the orders for her DEXA and mammogram.  Those automatically go to [facility] unless she tells us somewhere else should rather have those.  Please ask her if she would rather have those somewhere else so we can coordinate with Karina.

## 2020-02-04 NOTE — TELEPHONE ENCOUNTER
Patient has appt on 2-28 and wants to know if she can come in 25th to get her labs done? Also requesting orders for mammogram and DEXA

## 2020-02-09 ENCOUNTER — RESULTS ENCOUNTER (OUTPATIENT)
Dept: FAMILY MEDICINE CLINIC | Facility: CLINIC | Age: 75
End: 2020-02-09

## 2020-02-09 DIAGNOSIS — E03.9 ACQUIRED HYPOTHYROIDISM: ICD-10-CM

## 2020-02-09 DIAGNOSIS — E78.2 MIXED HYPERLIPIDEMIA: ICD-10-CM

## 2020-02-25 LAB
ALBUMIN SERPL-MCNC: 4.3 G/DL (ref 3.5–5.2)
ALBUMIN/GLOB SERPL: 1.6 G/DL
ALP SERPL-CCNC: 119 U/L (ref 39–117)
ALT SERPL-CCNC: 23 U/L (ref 1–33)
AST SERPL-CCNC: 16 U/L (ref 1–32)
BASOPHILS # BLD AUTO: 0.08 10*3/MM3 (ref 0–0.2)
BASOPHILS NFR BLD AUTO: 0.8 % (ref 0–1.5)
BILIRUB SERPL-MCNC: 0.4 MG/DL (ref 0.2–1.2)
BUN SERPL-MCNC: 13 MG/DL (ref 8–23)
BUN/CREAT SERPL: 17.8 (ref 7–25)
CALCIUM SERPL-MCNC: 10.3 MG/DL (ref 8.6–10.5)
CHLORIDE SERPL-SCNC: 99 MMOL/L (ref 98–107)
CHOLEST SERPL-MCNC: 255 MG/DL (ref 0–200)
CO2 SERPL-SCNC: 26.2 MMOL/L (ref 22–29)
CREAT SERPL-MCNC: 0.73 MG/DL (ref 0.57–1)
EOSINOPHIL # BLD AUTO: 0.21 10*3/MM3 (ref 0–0.4)
EOSINOPHIL NFR BLD AUTO: 2.2 % (ref 0.3–6.2)
ERYTHROCYTE [DISTWIDTH] IN BLOOD BY AUTOMATED COUNT: 12.9 % (ref 12.3–15.4)
GLOBULIN SER CALC-MCNC: 2.7 GM/DL
GLUCOSE SERPL-MCNC: 97 MG/DL (ref 65–99)
HCT VFR BLD AUTO: 44.7 % (ref 34–46.6)
HDLC SERPL-MCNC: 62 MG/DL (ref 40–60)
HGB BLD-MCNC: 14.8 G/DL (ref 12–15.9)
IMM GRANULOCYTES # BLD AUTO: 0.03 10*3/MM3 (ref 0–0.05)
IMM GRANULOCYTES NFR BLD AUTO: 0.3 % (ref 0–0.5)
LDLC SERPL CALC-MCNC: 157 MG/DL (ref 0–100)
LYMPHOCYTES # BLD AUTO: 1.57 10*3/MM3 (ref 0.7–3.1)
LYMPHOCYTES NFR BLD AUTO: 16.2 % (ref 19.6–45.3)
MCH RBC QN AUTO: 29.2 PG (ref 26.6–33)
MCHC RBC AUTO-ENTMCNC: 33.1 G/DL (ref 31.5–35.7)
MCV RBC AUTO: 88.2 FL (ref 79–97)
MONOCYTES # BLD AUTO: 1.06 10*3/MM3 (ref 0.1–0.9)
MONOCYTES NFR BLD AUTO: 10.9 % (ref 5–12)
NEUTROPHILS # BLD AUTO: 6.77 10*3/MM3 (ref 1.7–7)
NEUTROPHILS NFR BLD AUTO: 69.6 % (ref 42.7–76)
NRBC BLD AUTO-RTO: 0 /100 WBC (ref 0–0.2)
PLATELET # BLD AUTO: 230 10*3/MM3 (ref 140–450)
POTASSIUM SERPL-SCNC: 4.5 MMOL/L (ref 3.5–5.2)
PROT SERPL-MCNC: 7 G/DL (ref 6–8.5)
RBC # BLD AUTO: 5.07 10*6/MM3 (ref 3.77–5.28)
SODIUM SERPL-SCNC: 137 MMOL/L (ref 136–145)
TRIGL SERPL-MCNC: 178 MG/DL (ref 0–150)
TSH SERPL DL<=0.005 MIU/L-ACNC: 2.05 UIU/ML (ref 0.27–4.2)
VLDLC SERPL CALC-MCNC: 35.6 MG/DL
WBC # BLD AUTO: 9.72 10*3/MM3 (ref 3.4–10.8)

## 2020-02-28 ENCOUNTER — OFFICE VISIT (OUTPATIENT)
Dept: FAMILY MEDICINE CLINIC | Facility: CLINIC | Age: 75
End: 2020-02-28

## 2020-02-28 VITALS
HEART RATE: 68 BPM | TEMPERATURE: 97.2 F | OXYGEN SATURATION: 97 % | DIASTOLIC BLOOD PRESSURE: 76 MMHG | SYSTOLIC BLOOD PRESSURE: 130 MMHG | HEIGHT: 64 IN | BODY MASS INDEX: 32.1 KG/M2 | WEIGHT: 188 LBS

## 2020-02-28 DIAGNOSIS — I48.91 ATRIAL FIBRILLATION, UNSPECIFIED TYPE (HCC): ICD-10-CM

## 2020-02-28 DIAGNOSIS — K59.09 CHRONIC CONSTIPATION: ICD-10-CM

## 2020-02-28 DIAGNOSIS — E03.9 ACQUIRED HYPOTHYROIDISM: ICD-10-CM

## 2020-02-28 DIAGNOSIS — Z00.00 MEDICARE ANNUAL WELLNESS VISIT, SUBSEQUENT: Primary | ICD-10-CM

## 2020-02-28 DIAGNOSIS — M16.12 ARTHRITIS OF LEFT HIP: ICD-10-CM

## 2020-02-28 DIAGNOSIS — I10 ESSENTIAL HYPERTENSION, BENIGN: ICD-10-CM

## 2020-02-28 DIAGNOSIS — E78.2 MIXED HYPERLIPIDEMIA: ICD-10-CM

## 2020-02-28 DIAGNOSIS — M16.10 PRIMARY OSTEOARTHRITIS OF HIP, UNSPECIFIED LATERALITY: ICD-10-CM

## 2020-02-28 PROBLEM — J06.9 ACUTE UPPER RESPIRATORY INFECTION: Status: RESOLVED | Noted: 2019-08-23 | Resolved: 2020-02-28

## 2020-02-28 PROCEDURE — G0439 PPPS, SUBSEQ VISIT: HCPCS | Performed by: FAMILY MEDICINE

## 2020-02-28 PROCEDURE — 99213 OFFICE O/P EST LOW 20 MIN: CPT | Performed by: FAMILY MEDICINE

## 2020-02-28 RX ORDER — DOCUSATE SODIUM 100 MG/1
100 CAPSULE, LIQUID FILLED ORAL 2 TIMES DAILY PRN
Qty: 180 CAPSULE | Refills: 3 | Status: SHIPPED | OUTPATIENT
Start: 2020-02-28 | End: 2020-03-02 | Stop reason: SDUPTHER

## 2020-02-28 NOTE — PROGRESS NOTES
Subsequent Medicare Wellness Visit   The ABC's of the Annual Wellness Visit    Chief Complaint   Patient presents with   • Medicare Wellness-subsequent       HPI:  Hillary Crenshaw, -1945, is a 74 y.o. female who presents for a Subsequent Medicare Wellness Visit.    Hypothyroidism- doing well, on meds,  had recent labs.     htn- doing well on meds    Hyperlipidemia-had recent lab that was a little high.  Is not on medication.  Has declined this.     Allergies- stable on meds     afib- stable and following with cardiology     OA- stable on meds, hip doing well with replacement, follows with ortho    Pt reports she has down days as her  had a stroke but she is doing well and is not depressed.     Having some constipation mild but chronic. Colace has helped in the past.     Recent Hospitalizations:  No hospitalization(s) within the last year..    Current Medical Providers:  Patient Care Team:  Laura Muñoz MD as PCP - General (Family Medicine)    Health Habits and Functional and Cognitive Screening and Depression Screening:  Functional & Cognitive Status 2020   Do you have difficulty preparing food and eating? No   Do you have difficulty bathing yourself, getting dressed or grooming yourself? No   Do you have difficulty using the toilet? Yes   Do you have difficulty moving around from place to place? No   Do you have trouble with steps or getting out of a bed or a chair? No   Current Diet Well Balanced Diet   Dental Exam Up to date   Eye Exam Up to date   Exercise (times per week) 7 times per week   Current Exercise Activities Include Walking   Do you need help using the phone?  No   Are you deaf or do you have serious difficulty hearing?  No   Do you need help with transportation? No   Do you need help shopping? No   Do you need help preparing meals?  No   Do you need help with housework?  No   Do you need help with laundry? No   Do you need help taking your medications? No   Do you need help  managing money? No   Do you ever drive or ride in a car without wearing a seat belt? No   Have you felt unusual stress, anger or loneliness in the last month? Yes   Who do you live with? Spouse   If you need help, do you have trouble finding someone available to you? No   Have you been bothered in the last four weeks by sexual problems? No   Do you have difficulty concentrating, remembering or making decisions? No       Compared to one year ago, the patient feels her physical health is the same and her mental health is the same.    Depression Screen:  PHQ-2/PHQ-9 Depression Screening 2/28/2020   Little interest or pleasure in doing things 2   Feeling down, depressed, or hopeless 2   Trouble falling or staying asleep, or sleeping too much 3   Feeling tired or having little energy 0   Poor appetite or overeating 0   Feeling bad about yourself - or that you are a failure or have let yourself or your family down 0   Trouble concentrating on things, such as reading the newspaper or watching television 0   Moving or speaking so slowly that other people could have noticed. Or the opposite - being so fidgety or restless that you have been moving around a lot more than usual 0   Thoughts that you would be better off dead, or of hurting yourself in some way 0   Total Score 7   If you checked off any problems, how difficult have these problems made it for you to do your work, take care of things at home, or get along with other people? Not difficult at all         Past Medical/Family/Social History:  The following portions of the patient's history were reviewed and updated as appropriate: allergies, current medications, past family history, past medical history, past social history, past surgical history and problem list.    No Known Allergies      Current Outpatient Medications:   •  aspirin 81 MG tablet, Take 81 mg by mouth., Disp: , Rfl:   •  diclofenac (VOLTAREN) 75 MG EC tablet, Take 1 tablet by mouth 2 (Two) Times a  Day., Disp: 180 tablet, Rfl: 3  •  diltiaZEM CD (CARTIA XT) 240 MG 24 hr capsule, Take 1 capsule by mouth Daily., Disp: 90 capsule, Rfl: 3  •  fluorouracil (EFUDEX) 5 % cream, , Disp: , Rfl: 1  •  fluticasone (FLONASE) 50 MCG/ACT nasal spray, 2 sprays into the nostril(s) as directed by provider Daily., Disp: 3 bottle, Rfl: 3  •  furosemide (LASIX) 20 MG tablet, Take 1 tablet by mouth Daily., Disp: 90 tablet, Rfl: 3  •  levothyroxine (SYNTHROID, LEVOTHROID) 75 MCG tablet, Take 1 tablet by mouth Daily., Disp: 90 tablet, Rfl: 3  •  docusate sodium (COLACE) 100 MG capsule, Take 1 capsule by mouth 2 (Two) Times a Day As Needed for Constipation., Disp: 180 capsule, Rfl: 3    Aspirin use counseling: Taking ASA appropriately as indicated    Current medication list contains no high risk medications.  No harmful drug interactions have been identified.     Family History   Problem Relation Age of Onset   • Breast cancer Mother    • Heart attack Father    • Alcohol abuse Sister    • Arthritis Sister    • Diabetes Sister    • Hyperlipidemia Sister    • Osteoporosis Sister    • Thyroid disease Sister    • Alcohol abuse Brother    • Hypertension Brother    • Hyperlipidemia Brother    • Lung cancer Brother    • Heart attack Brother    • Bipolar disorder Maternal Grandmother    • Breast cancer Maternal Grandmother    • Depression Maternal Grandmother    • Other Other         cardiac disorder, neoplasm of breast   • No Known Problems Maternal Grandfather    • No Known Problems Paternal Grandmother    • No Known Problems Paternal Grandfather        Social History     Tobacco Use   • Smoking status: Former Smoker   • Smokeless tobacco: Never Used   Substance Use Topics   • Alcohol use: Yes     Alcohol/week: 1.0 standard drinks     Types: 1 Glasses of wine per week     Comment: 7 drinks week       Past Surgical History:   Procedure Laterality Date   • FOOT SURGERY     • TOTAL HIP ARTHROPLASTY         Patient Active Problem List  "  Diagnosis   • Arthritis of left hip   • Hypothyroidism   • Incomplete uterovaginal prolapse   • Midline cystocele   • Mixed stress and urge urinary incontinence   • Obesity   • Rectocele   • Status post left hip replacement   • PVC's (premature ventricular contractions)   • Allergic rhinitis   • Asymptomatic PVCs   • Atrial fibrillation (CMS/HCC)   • Chronic constipation   • Essential hypertension, benign   • GERD (gastroesophageal reflux disease)   • Lung nodule   • Osteoarthritis   • Osteopenia   • Pessary maintenance   • Post-menopause   • Prolapse of female bladder, acquired   • Visit for screening mammogram   • Endometrial mass   • Mixed hyperlipidemia   • Medicare annual wellness visit, subsequent       Review of Systems   Respiratory: Negative for shortness of breath.    Cardiovascular: Negative for chest pain.       Objective     Vitals:    02/28/20 1429   BP: 130/76   Pulse: 68   Temp: 97.2 °F (36.2 °C)   TempSrc: Temporal   SpO2: 97%   Weight: 85.3 kg (188 lb)   Height: 162.6 cm (64\")       Patient's Body mass index is 32.27 kg/m². BMI is above normal parameters. Recommendations include: exercise counseling.      No exam data present    The patient has no evidence of cognitve impairment.  The patient has no evidence of cognitive impairment. 3/3 items were correctly remembered at 5 minutes.     Physical Exam   Constitutional: She is oriented to person, place, and time. She appears well-developed and well-nourished.   Cardiovascular: Normal rate, regular rhythm, normal heart sounds and intact distal pulses.   Pulmonary/Chest: Effort normal and breath sounds normal.   Musculoskeletal: Normal range of motion. She exhibits no edema.   Neurological: She is alert and oriented to person, place, and time.   Skin: Skin is warm and dry.   Psychiatric: She has a normal mood and affect. Her behavior is normal.       Recent Lab Results:  Lab Results   Component Value Date    GLU 97 02/24/2020     Lab Results "   Component Value Date    TRIG 178 (H) 02/24/2020    HDL 62 (H) 02/24/2020    VLDL 35.6 02/24/2020       Assessment/Plan   Age-appropriate Screening Schedule:  Refer to the list below for future screening recommendations based on patient's age, sex and/or medical conditions.      Health Maintenance   Topic Date Due   • TDAP/TD VACCINES (1 - Tdap) 08/[birth year]/1956   • ZOSTER VACCINE (1 of 2) 08/15/1995   • DXA SCAN  11/27/2020   • LIPID PANEL  02/24/2021   • MAMMOGRAM  03/08/2021   • COLONOSCOPY  08/01/2027   • INFLUENZA VACCINE  Completed       Medicare Risks and Personalized Health Plan:  Advance Directive Discussion      CMS-Preventive Services Quick Reference  Medicare Preventive Services Addressed:  Annual Wellness Visit (AWV)    Advance Care Planning:  ACP discussion was held with the patient during this visit. Patient has an advance directive, copy requested.    Diagnoses and all orders for this visit:    1. Medicare annual wellness visit, subsequent (Primary)    2. Acquired hypothyroidism    3. Atrial fibrillation, unspecified type (CMS/[facility])    4. Mixed hyperlipidemia    5. Essential hypertension, benign    6. Primary osteoarthritis of hip, unspecified laterality    7. Arthritis of left hip    8. Chronic constipation    Other orders  -     docusate sodium (COLACE) 100 MG capsule; Take 1 capsule by mouth 2 (Two) Times a Day As Needed for Constipation.  Dispense: 180 capsule; Refill: 3        An After Visit Summary and PPPS with all of these plans were given to the patient.      Follow Up:  Return in about 6 months (around 8/28/2020) for Recheck.            Mammogram and bone density test are pending.  Patient is just had diagnostic labs previous to this appointment to be reviewed at this appointment.  No other diagnostic testing necessary.    Added in colace prn.

## 2020-03-02 ENCOUNTER — TELEPHONE (OUTPATIENT)
Dept: FAMILY MEDICINE CLINIC | Facility: CLINIC | Age: 75
End: 2020-03-02

## 2020-03-02 DIAGNOSIS — K59.09 CHRONIC CONSTIPATION: Primary | ICD-10-CM

## 2020-03-02 RX ORDER — DOCUSATE SODIUM 100 MG/1
100 CAPSULE, LIQUID FILLED ORAL 2 TIMES DAILY PRN
Qty: 180 CAPSULE | Refills: 3 | Status: SHIPPED | OUTPATIENT
Start: 2020-03-02 | End: 2021-08-25

## 2020-03-02 NOTE — TELEPHONE ENCOUNTER
PT CALLED AND STATED THAT THE PHARMACY NEVER RECEIVED THE RX FOR HER MEDICATION     docusate sodium (COLACE) 100 MG capsule    PT ASKED US TO RESEND.     [facility] [address] [id number], [place] - [address] [facility] AT [address] - [phone]  - [phone] (Phone)  [phone] (Fax)

## 2020-03-11 ENCOUNTER — HOSPITAL ENCOUNTER (OUTPATIENT)
Dept: BONE DENSITY | Facility: HOSPITAL | Age: 75
Discharge: HOME | End: 2020-03-11

## 2020-03-11 ENCOUNTER — HOSPITAL ENCOUNTER (OUTPATIENT)
Dept: MAMMOGRAPHY | Facility: HOSPITAL | Age: 75
Discharge: HOME | End: 2020-03-11
Admitting: FAMILY MEDICINE

## 2020-03-11 DIAGNOSIS — Z12.31 VISIT FOR SCREENING MAMMOGRAM: ICD-10-CM

## 2020-03-11 DIAGNOSIS — M85.80 OSTEOPENIA, UNSPECIFIED LOCATION: ICD-10-CM

## 2020-03-11 DIAGNOSIS — Z78.0 POST-MENOPAUSE: ICD-10-CM

## 2020-03-11 PROCEDURE — 77067 SCR MAMMO BI INCL CAD: CPT

## 2020-03-11 PROCEDURE — 77063 BREAST TOMOSYNTHESIS BI: CPT

## 2020-03-11 PROCEDURE — 77080 DXA BONE DENSITY AXIAL: CPT

## 2020-03-13 DIAGNOSIS — R92.8 ABNORMAL MAMMOGRAM: Primary | ICD-10-CM

## 2020-03-19 ENCOUNTER — HOSPITAL ENCOUNTER (OUTPATIENT)
Dept: ULTRASOUND IMAGING | Facility: HOSPITAL | Age: 75
Discharge: HOME | End: 2020-03-19

## 2020-03-19 ENCOUNTER — HOSPITAL ENCOUNTER (OUTPATIENT)
Dept: MAMMOGRAPHY | Facility: HOSPITAL | Age: 75
Discharge: HOME | End: 2020-03-19
Admitting: FAMILY MEDICINE

## 2020-03-19 DIAGNOSIS — R92.8 ABNORMAL MAMMOGRAM: ICD-10-CM

## 2020-03-19 PROCEDURE — 77065 DX MAMMO INCL CAD UNI: CPT

## 2020-03-19 PROCEDURE — 76642 ULTRASOUND BREAST LIMITED: CPT

## 2020-03-20 DIAGNOSIS — R92.8 ABNORMAL MAMMOGRAM: Primary | ICD-10-CM

## 2020-05-05 ENCOUNTER — TELEPHONE (OUTPATIENT)
Dept: CARDIOLOGY | Facility: CLINIC | Age: 75
End: 2020-05-05

## 2020-05-06 ENCOUNTER — OFFICE VISIT (OUTPATIENT)
Dept: CARDIOLOGY | Facility: CLINIC | Age: 75
End: 2020-05-06

## 2020-05-06 VITALS — BODY MASS INDEX: 31.07 KG/M2 | HEIGHT: 64 IN | WEIGHT: 182 LBS

## 2020-05-06 DIAGNOSIS — E04.1 THYROID CYST: ICD-10-CM

## 2020-05-06 DIAGNOSIS — E78.2 MIXED HYPERLIPIDEMIA: ICD-10-CM

## 2020-05-06 DIAGNOSIS — I49.3 ASYMPTOMATIC PVCS: ICD-10-CM

## 2020-05-06 DIAGNOSIS — I47.1 PSVT (PAROXYSMAL SUPRAVENTRICULAR TACHYCARDIA) (HCC): Primary | ICD-10-CM

## 2020-05-06 DIAGNOSIS — I65.23 ATHEROSCLEROSIS OF BOTH CAROTID ARTERIES: ICD-10-CM

## 2020-05-06 DIAGNOSIS — I10 ESSENTIAL HYPERTENSION: ICD-10-CM

## 2020-05-06 DIAGNOSIS — H53.2 DOUBLE VISION: ICD-10-CM

## 2020-05-06 PROCEDURE — 99214 OFFICE O/P EST MOD 30 MIN: CPT | Performed by: NURSE PRACTITIONER

## 2020-05-06 NOTE — PROGRESS NOTES
Telehealth Visit    Date of Visit: 2020  Encounter Provider: JUSTIN Anderson  Place of Service: [facility] CARDIOLOGY  Patient Name: Hillray Crenshaw  :1945  Primary Cardiologist: Dr. Jeyson Mayberry    Chief Complaint   Patient presents with   • Follow-up   :     Dear Dr. Laura Muñoz,     HPI: Hillary Crenshaw is a pleasant 74 y.o. female who is an established patient of our practice. Due to COVID-19 virus, I am conducting a telehealth visit via video with patient and she has consented to this visit today. She is a new patient to me and her previous records have been reviewed.    She has a known history of SVT and frequent PVCs.  She was treated with metoprolol.  On 10/4/2017 she had a 2D echocardiogram completed which showed an EF of 62%, grade 1 diastolic dysfunction, mild to moderate thickening of the aortic valve, mild mitral annular calcification, trace mitral valve regurgitation, and mild tricuspid valve regurgitation.  Lexiscan Myoview stress test completed same day showed no evidence of ischemia and frequent PVCs including runs of accelerated idioventricular rhythm with the longest 17 beats in duration.     In May 2019, she followed up with Dr. Phuc Caraballo.  During that visit she reported that she had been under a lot of stress due to her 's dementia.  She was noted to have a small carotid bruit and a carotid duplex was recommended.  This was completed on 6/3/2019 which showed right and left carotid stenosis less than 15% and bilateral thyroid cysts.    Today is her annual follow-up visit.  She was supposed to see Dr. Jeyson Mayberry but due to the COVID-19 pandemic I am conducting a telehealth visit today.  She thinks overall she has been doing very well.  In her past medical history is listed atrial fibrillation and she said Dr. Caraballo never thought she had atrial fibrillation.  She has not been on a blood thinner in the past.  She denies palpitations, chest  pain, PND, orthopnea, edema, dizziness, or syncope.  She said a couple of times she has had really horrible heartburn which is subsided.  She takes a water pill for lower extremity edema which have been really well controlled.  She also watches her salt intake.  She said the other day she was just sitting on her deck having breakfast and had a brief episode of double vision.  She had cataract surgery earlier this year and plans to follow-up with her ophthalmologist.  She denied any other strokelike symptoms.  I reviewed with her the carotid test from last June including the thyroid cyst.  She plans to discuss with her PCP.    I reviewed her last blood work from 2/24/2020: CBC showed normal hemoglobin and hematocrit.  TSH normal.  CMP normal except for alkaline phosphatase of 119.  Total cholesterol 255, triglycerides 178, HDL 62, and .    Past Medical History:   Diagnosis Date   • Abnormal stool test    • Acute allergic rhinitis    • Acute sinusitis    • Allergic rhinitis    • Ankle pain    • Arthritis    • Arthritis of left hip    • Asymptomatic PVCs    • Atrial fibrillation (CMS/HCC)    • Boil, thigh    • Chest pain    • Chronic constipation    • Essential hypertension, benign    • Gastritis    • GERD (gastroesophageal reflux disease)    • Head ache    • Hyperlipidemia    • Hypervitaminosis D    • Hypothyroidism    • Lung nodule    • Osteoarthritis     left hip   • Osteopenia    • Palpitations    • Pessary maintenance    • Post-menopause    • Prolapse of female bladder, acquired    • PSVT (paroxysmal supraventricular tachycardia) (CMS/HCC)    • Rectal bleeding    • Right foot pain    • SOB (shortness of breath)    • Urine frequency    • UTI (urinary tract infection)    • Visit for screening mammogram        Past Surgical History:   Procedure Laterality Date   • FOOT SURGERY     • TOTAL HIP ARTHROPLASTY         Social History     Socioeconomic History   • Marital status:      Spouse name: Not on file    • Number of children: Not on file   • Years of education: Not on file   • Highest education level: Not on file   Tobacco Use   • Smoking status: Former Smoker     Last attempt to quit: 1966     Years since quittin.3   • Smokeless tobacco: Never Used   Substance and Sexual Activity   • Alcohol use: Yes     Alcohol/week: 1.0 standard drinks     Types: 1 Glasses of wine per week     Comment: 7 drinks week/// Caffeine use: 2-3 cups daily   • Drug use: No   • Sexual activity: Defer       Family History   Problem Relation Age of Onset   • Breast cancer Mother    • Heart attack Father    • Alcohol abuse Sister    • Arthritis Sister    • Diabetes Sister    • Hyperlipidemia Sister    • Osteoporosis Sister    • Thyroid disease Sister    • Alcohol abuse Brother    • Hypertension Brother    • Hyperlipidemia Brother    • Lung cancer Brother    • Heart attack Brother    • Bipolar disorder Maternal Grandmother    • Breast cancer Maternal Grandmother    • Depression Maternal Grandmother    • Other Other         cardiac disorder, neoplasm of breast   • No Known Problems Maternal Grandfather    • No Known Problems Paternal Grandmother    • No Known Problems Paternal Grandfather        The following portion of the patient's history were reviewed and updated as appropriate: past medical history, past surgical history, past social history, past family history, allergies, current medications, and problem list.    Review of Systems   Constitution: Negative.   Eyes: Positive for double vision.   Cardiovascular: Positive for dyspnea on exertion. Negative for chest pain, irregular heartbeat, leg swelling, near-syncope, orthopnea, palpitations, paroxysmal nocturnal dyspnea and syncope.   Respiratory: Negative.    Gastrointestinal: Positive for heartburn.   Neurological: Negative.        No Known Allergies      Current Outpatient Medications:   •  diclofenac (VOLTAREN) 75 MG EC tablet, Take 1 tablet by mouth 2 (Two) Times a Day., Disp:  "180 tablet, Rfl: 3  •  diltiaZEM CD (CARTIA XT) 240 MG 24 hr capsule, Take 1 capsule by mouth Daily., Disp: 90 capsule, Rfl: 3  •  docusate sodium (COLACE) 100 MG capsule, Take 1 capsule by mouth 2 (Two) Times a Day As Needed for Constipation., Disp: 180 capsule, Rfl: 3  •  fluticasone (FLONASE) 50 MCG/ACT nasal spray, 2 sprays into the nostril(s) as directed by provider Daily., Disp: 3 bottle, Rfl: 3  •  furosemide (LASIX) 20 MG tablet, Take 1 tablet by mouth Daily., Disp: 90 tablet, Rfl: 3  •  levothyroxine (SYNTHROID, LEVOTHROID) 75 MCG tablet, Take 1 tablet by mouth Daily., Disp: 90 tablet, Rfl: 3        Objective:     Vitals:    05/06/20 1340   Weight: 82.6 kg (182 lb)   Height: 162.6 cm (64\")     Body mass index is 31.24 kg/m².    Due to telehealth visit, there was no EKG, vitals, or weight performed in our office.  Vitals/Weight were reported by the patient and conducted at home.     PHYSICAL EXAM:    Vitals Reviewed  General Appearance: No acute distress, well developed and well nourished.   Eyes: Conjunctivae and lids: No erythema, swelling, or discharge. Sclerae anicteric.   HENT: Atraumatic, normocephalic. External eyes, ears, and nose normal. No hearing loss noted. Mucous membranes normal. Lips not cyanotic.   Respiratory: No signs of respiratory distress. Respiration rhythm and depth normal.   Skin: General appearance normal. No pallor, cyanosis, diaphoresis.   Psychiatric: Patient alert and oriented to person, place, and time. Speech and behavior appropriate. Normal mood and affect.        Assessment:       Diagnosis Plan   1. PSVT (paroxysmal supraventricular tachycardia) (CMS/HCC)     2. Asymptomatic PVCs     3. Mixed hyperlipidemia     4. Essential hypertension     5. Thyroid cyst     6. Atherosclerosis of both carotid arteries     7. Double vision            Plan:       1/2.  PSVT and PVCs: She did not tolerate the metoprolol and is doing much better on diltiazem.  She denies any further " palpitations.  Questionable history of atrial fibrillation but she denies ever being diagnosed with this.    2.  Hyperlipidemia: Cholesterol panel was elevated on last check.  She may benefit from statin therapy and plans to follow-up with her PCP.    3.  Hypertension: Continue to monitor.    4.  Thyroid Cyst: Incidentally noted on carotid duplex June 2019 I have asked her to discuss with her PCP.  She has thyroid disease and is on levothyroxine.    5.  Carotid Atherosclerosis: Carotid duplex June/2019 showed carotid plaque less than 15% bilaterally.  Recheck in 3 to 5 years.    6.  Double Vision: She had a brief episode of double vision occurring the other day and plans to follow-up with her ophthalmologist.    7.  Her appointment this week was supposed to be with Dr. Mayberry to establish care.  She is anxious to meet her and I recommended a six-month follow-up visit with Dr. Mayberry.    This patient has consented to a telehealth visit via video. The visit was scheduled as a video visit to comply with patient safety concerns in accordance with CDC recommendations.  All vitals recorded within this visit are reported by the patient.  I spent 25 minutes in total including but not limited to the 12 minutes spent in direct conversation with this patient.      As always, it has been a pleasure to participate in your patient's care. Thank you.       Sincerely,         JUSTIN Graham        Dictated utilizing Dragon dictation

## 2020-06-08 ENCOUNTER — TELEPHONE (OUTPATIENT)
Dept: FAMILY MEDICINE CLINIC | Facility: CLINIC | Age: 75
End: 2020-06-08

## 2020-06-08 DIAGNOSIS — M16.12 ARTHRITIS OF LEFT HIP: Primary | ICD-10-CM

## 2020-06-08 RX ORDER — METHYLPREDNISOLONE 4 MG/1
TABLET ORAL
Qty: 21 EACH | Refills: 0 | Status: SHIPPED | OUTPATIENT
Start: 2020-06-08 | End: 2020-12-07

## 2020-06-08 NOTE — TELEPHONE ENCOUNTER
Patient is set up for telephone visit on 6/12 ( not sure if she'll have Wi-Fi ) in the meantime she would like to know if a Medrol dose pack can be sent to her Pharm on file ( [facility] ) for her bursitis

## 2020-06-12 ENCOUNTER — OFFICE VISIT (OUTPATIENT)
Dept: FAMILY MEDICINE CLINIC | Facility: CLINIC | Age: 75
End: 2020-06-12

## 2020-06-12 DIAGNOSIS — E78.2 MIXED HYPERLIPIDEMIA: ICD-10-CM

## 2020-06-12 DIAGNOSIS — E04.1 THYROID CYST: ICD-10-CM

## 2020-06-12 DIAGNOSIS — I10 ESSENTIAL HYPERTENSION, BENIGN: ICD-10-CM

## 2020-06-12 DIAGNOSIS — J30.2 SEASONAL ALLERGIC RHINITIS, UNSPECIFIED TRIGGER: ICD-10-CM

## 2020-06-12 DIAGNOSIS — K59.09 CHRONIC CONSTIPATION: ICD-10-CM

## 2020-06-12 DIAGNOSIS — I48.91 ATRIAL FIBRILLATION, UNSPECIFIED TYPE (HCC): Primary | ICD-10-CM

## 2020-06-12 PROCEDURE — 99442 PR PHYS/QHP TELEPHONE EVALUATION 11-20 MIN: CPT | Performed by: FAMILY MEDICINE

## 2020-06-12 RX ORDER — FLUTICASONE PROPIONATE 50 MCG
2 SPRAY, SUSPENSION (ML) NASAL DAILY
Qty: 3 BOTTLE | Refills: 3 | Status: SHIPPED | OUTPATIENT
Start: 2020-06-12 | End: 2021-07-15

## 2020-06-12 NOTE — PROGRESS NOTES
Subjective   Hillary Crenshaw is a 74 y.o. female.     Chief Complaint   Patient presents with   • Hypothyroidism       History of Present Illness   Bursitis- stable on meds, working on exercises.     Hypothyroidism- doing well, on meds,  had recent labs.wants f/u imaging.      htn- doing well on meds     Hyperlipidemia-had recent lab that was a little high.  Is not on medication.  Has declined this.     Allergies- stable on meds     afib- stable and following with cardiology     OA- stable on meds, hip doing well with replacement, follows with ortho        Having some constipation mild but chronic. Colace has helped in the past.         The following portions of the patient's history were reviewed and updated as appropriate: allergies, current medications, past family history, past medical history, past social history, past surgical history and problem list.    Past Medical History:   Diagnosis Date   • Abnormal stool test    • Acute allergic rhinitis    • Acute sinusitis    • Allergic rhinitis    • Ankle pain    • Arthritis    • Arthritis of left hip    • Asymptomatic PVCs    • Atrial fibrillation (CMS/HCC)    • Boil, thigh    • Chest pain    • Chronic constipation    • Essential hypertension, benign    • Gastritis    • GERD (gastroesophageal reflux disease)    • Head ache    • Hyperlipidemia    • Hypervitaminosis D    • Hypothyroidism    • Lung nodule    • Osteoarthritis     left hip   • Osteopenia    • Palpitations    • Pessary maintenance    • Post-menopause    • Prolapse of female bladder, acquired    • PSVT (paroxysmal supraventricular tachycardia) (CMS/HCC)    • Rectal bleeding    • Right foot pain    • SOB (shortness of breath)    • Urine frequency    • UTI (urinary tract infection)    • Visit for screening mammogram        Past Surgical History:   Procedure Laterality Date   • FOOT SURGERY     • TOTAL HIP ARTHROPLASTY         Family History   Problem Relation Age of Onset   • Breast cancer Mother    •  Heart attack Father    • Alcohol abuse Sister    • Arthritis Sister    • Diabetes Sister    • Hyperlipidemia Sister    • Osteoporosis Sister    • Thyroid disease Sister    • Alcohol abuse Brother    • Hypertension Brother    • Hyperlipidemia Brother    • Lung cancer Brother    • Heart attack Brother    • Bipolar disorder Maternal Grandmother    • Breast cancer Maternal Grandmother    • Depression Maternal Grandmother    • Other Other         cardiac disorder, neoplasm of breast   • No Known Problems Maternal Grandfather    • No Known Problems Paternal Grandmother    • No Known Problems Paternal Grandfather        Social History     Socioeconomic History   • Marital status:      Spouse name: Not on file   • Number of children: Not on file   • Years of education: Not on file   • Highest education level: Not on file   Tobacco Use   • Smoking status: Former Smoker     Last attempt to quit: 1966     Years since quittin.4   • Smokeless tobacco: Never Used   Substance and Sexual Activity   • Alcohol use: Yes     Alcohol/week: 1.0 standard drinks     Types: 1 Glasses of wine per week     Comment: 7 drinks week/// Caffeine use: 2-3 cups daily   • Drug use: No   • Sexual activity: Defer       Review of Systems   Respiratory: Negative for shortness of breath.    Cardiovascular: Negative for chest pain.       Objective   There were no vitals taken for this visit.  There is no height or weight on file to calculate BMI.  Physical Exam   Constitutional: She is oriented to person, place, and time. She appears well-developed and well-nourished.   Cardiovascular: Normal rate, regular rhythm, normal heart sounds and intact distal pulses.   Pulmonary/Chest: Effort normal and breath sounds normal.   Musculoskeletal: Normal range of motion. She exhibits no edema.   Neurological: She is alert and oriented to person, place, and time.   Skin: Skin is warm and dry.   Psychiatric: She has a normal mood and affect. Her behavior is  normal.         Assessment/Plan   Hillary was seen today for hypothyroidism.    Diagnoses and all orders for this visit:    Atrial fibrillation, unspecified type (CMS/HCC)    Essential hypertension, benign    Mixed hyperlipidemia    Seasonal allergic rhinitis, unspecified trigger  -     fluticasone (FLONASE) 50 MCG/ACT nasal spray; 2 sprays into the nostril(s) as directed by provider Daily.    Chronic constipation    Thyroid cyst  -     US Thyroid; Future        Doing well, cont meds, f/u in 6 months and will get labs. Consent to phone and lasted 13 minutes.

## 2020-06-22 ENCOUNTER — HOSPITAL ENCOUNTER (OUTPATIENT)
Dept: ULTRASOUND IMAGING | Facility: HOSPITAL | Age: 75
Discharge: HOME | End: 2020-06-22
Admitting: FAMILY MEDICINE

## 2020-06-22 DIAGNOSIS — E04.1 THYROID CYST: ICD-10-CM

## 2020-06-22 PROCEDURE — 76536 US EXAM OF HEAD AND NECK: CPT

## 2020-06-26 ENCOUNTER — TELEPHONE (OUTPATIENT)
Dept: FAMILY MEDICINE CLINIC | Facility: CLINIC | Age: 75
End: 2020-06-26

## 2020-06-26 NOTE — TELEPHONE ENCOUNTER
Patient called and said she was returning a call for results.  I didn't see any messages or anything in the chart.  Her number is [phone].

## 2020-09-11 ENCOUNTER — HOSPITAL ENCOUNTER (OUTPATIENT)
Dept: MAMMOGRAPHY | Facility: HOSPITAL | Age: 75
Discharge: HOME | End: 2020-09-11
Admitting: FAMILY MEDICINE

## 2020-09-11 ENCOUNTER — TELEPHONE (OUTPATIENT)
Dept: FAMILY MEDICINE CLINIC | Facility: CLINIC | Age: 75
End: 2020-09-11

## 2020-09-11 ENCOUNTER — HOSPITAL ENCOUNTER (OUTPATIENT)
Dept: ULTRASOUND IMAGING | Facility: HOSPITAL | Age: 75
Discharge: HOME | End: 2020-09-11
Admitting: FAMILY MEDICINE

## 2020-09-11 DIAGNOSIS — R92.8 ABNORMAL MAMMOGRAM: ICD-10-CM

## 2020-09-11 DIAGNOSIS — R92.8 ABNORMAL MAMMOGRAM: Primary | ICD-10-CM

## 2020-09-11 PROCEDURE — 77065 DX MAMMO INCL CAD UNI: CPT

## 2020-09-11 PROCEDURE — 76642 ULTRASOUND BREAST LIMITED: CPT

## 2020-09-11 PROCEDURE — G0279 TOMOSYNTHESIS, MAMMO: HCPCS

## 2020-09-11 NOTE — TELEPHONE ENCOUNTER
Michelle grijalva/ [facility] is requesting the following order Diagnostic F/U Mammo Rt side. Pt is schedule for a breast u/s at 2 today.   #: [phone]  Fax#: [phone]

## 2020-10-14 DIAGNOSIS — I48.91 ATRIAL FIBRILLATION, UNSPECIFIED TYPE (HCC): ICD-10-CM

## 2020-10-14 DIAGNOSIS — E03.9 ACQUIRED HYPOTHYROIDISM: ICD-10-CM

## 2020-10-14 DIAGNOSIS — M16.12 ARTHRITIS OF LEFT HIP: ICD-10-CM

## 2020-10-16 RX ORDER — DICLOFENAC SODIUM 75 MG/1
TABLET, DELAYED RELEASE ORAL
Qty: 180 TABLET | Refills: 2 | Status: SHIPPED | OUTPATIENT
Start: 2020-10-16 | End: 2021-01-18 | Stop reason: SDUPTHER

## 2020-10-16 RX ORDER — LEVOTHYROXINE SODIUM 0.07 MG/1
TABLET ORAL
Qty: 90 TABLET | Refills: 2 | Status: SHIPPED | OUTPATIENT
Start: 2020-10-16 | End: 2021-01-18 | Stop reason: SDUPTHER

## 2020-10-16 RX ORDER — DILTIAZEM HYDROCHLORIDE 240 MG/1
CAPSULE, EXTENDED RELEASE ORAL
Qty: 90 CAPSULE | Refills: 2 | Status: SHIPPED | OUTPATIENT
Start: 2020-10-16 | End: 2021-01-18 | Stop reason: SDUPTHER

## 2020-11-25 DIAGNOSIS — I10 ESSENTIAL HYPERTENSION: ICD-10-CM

## 2020-11-25 RX ORDER — FUROSEMIDE 20 MG/1
TABLET ORAL
Qty: 90 TABLET | Refills: 0 | Status: SHIPPED | OUTPATIENT
Start: 2020-11-25 | End: 2021-03-23

## 2020-12-07 ENCOUNTER — OFFICE VISIT (OUTPATIENT)
Dept: CARDIOLOGY | Facility: CLINIC | Age: 75
End: 2020-12-07

## 2020-12-07 VITALS
WEIGHT: 182.4 LBS | SYSTOLIC BLOOD PRESSURE: 128 MMHG | HEART RATE: 64 BPM | HEIGHT: 64 IN | DIASTOLIC BLOOD PRESSURE: 78 MMHG | BODY MASS INDEX: 31.14 KG/M2

## 2020-12-07 DIAGNOSIS — I49.3 PREMATURE VENTRICULAR CONTRACTIONS: Primary | ICD-10-CM

## 2020-12-07 DIAGNOSIS — I65.23 ATHEROSCLEROSIS OF BOTH CAROTID ARTERIES: ICD-10-CM

## 2020-12-07 DIAGNOSIS — I49.1 ATRIAL ECTOPY: ICD-10-CM

## 2020-12-07 DIAGNOSIS — I10 ESSENTIAL HYPERTENSION: ICD-10-CM

## 2020-12-07 PROCEDURE — 99214 OFFICE O/P EST MOD 30 MIN: CPT | Performed by: INTERNAL MEDICINE

## 2020-12-07 PROCEDURE — 93000 ELECTROCARDIOGRAM COMPLETE: CPT | Performed by: INTERNAL MEDICINE

## 2020-12-07 NOTE — PROGRESS NOTES
Date of Office Visit: 20  Encounter Provider: Jeyson Mayberry MD  Place of Service: [facility] CARDIOLOGY  Patient Name: Hillary Crenshaw  :1945    Chief Complaint   Patient presents with   • Rapid Heart Rate   :     HPI:     Ms. Crenshaw is 75 y.o. and presents today to establish care with me.  I have reviewed prior notes and there are no changes except for any new updates described below. I have also reviewed any information entered into the medical record by the patient or by ancillary staff.     In 2016, she had an episode of syncope. It was her second episode.  Both of them were preceded by abrupt onset, extremely rapid heart beat. She had a Holter and an echo.  The Holter showed frequent monomorphic PVCs (22% burden) and occasional PACs.  There were short bursts of PACs. In her chart, she was given a diagnosis of atrial fibrillation, but it does not appear that she was ever actually diagnosed with atrial fibrillation.  It was felt that her symptoms were brought on by SVT. She was placed on diltiazem, and has not had any sustained palpitations since. She sometimes feels a few, but then takes a long deep breath and they subside.    She denies chest pain, dyspnea, lightheadedness, syncope, orthopnea, or edema.     Past Medical History:   Diagnosis Date   • Abnormal stool test    • Acute allergic rhinitis    • Acute sinusitis    • Allergic rhinitis    • Ankle pain    • Arthritis of left hip    • Atherosclerosis of both carotid arteries     2019: <15% bilaterally   • Atrial ectopy    • Boil, thigh    • Chronic constipation    • Gastritis    • GERD (gastroesophageal reflux disease)    • Head ache    • Hyperlipidemia    • Hypertension    • Hypervitaminosis D    • Hypothyroidism    • Lung nodule    • Osteoarthritis     left hip   • Osteopenia    • Pessary maintenance    • Post-menopause    • Premature ventricular contractions    • Prolapse of female bladder, acquired    • PSVT  (paroxysmal supraventricular tachycardia) (CMS/HCC)    • Rectal bleeding    • Right foot pain    • Urine frequency    • UTI (urinary tract infection)        Past Surgical History:   Procedure Laterality Date   • FOOT SURGERY     • TOTAL HIP ARTHROPLASTY         Social History     Socioeconomic History   • Marital status:      Spouse name: Not on file   • Number of children: Not on file   • Years of education: Not on file   • Highest education level: Not on file   Tobacco Use   • Smoking status: Former Smoker     Quit date:      Years since quittin.9   • Smokeless tobacco: Never Used   Substance and Sexual Activity   • Alcohol use: Yes     Alcohol/week: 1.0 standard drinks     Types: 1 Glasses of wine per week     Comment: 7 drinks week/// Caffeine use: 2-3 cups daily   • Drug use: No   • Sexual activity: Defer       Family History   Problem Relation Age of Onset   • Breast cancer Mother    • Heart attack Father    • Alcohol abuse Sister    • Arthritis Sister    • Diabetes Sister    • Hyperlipidemia Sister    • Osteoporosis Sister    • Thyroid disease Sister    • Alcohol abuse Brother    • Hypertension Brother    • Hyperlipidemia Brother    • Lung cancer Brother    • Heart attack Brother    • Bipolar disorder Maternal Grandmother    • Breast cancer Maternal Grandmother    • Depression Maternal Grandmother    • Other Other         cardiac disorder, neoplasm of breast   • No Known Problems Maternal Grandfather    • No Known Problems Paternal Grandmother    • No Known Problems Paternal Grandfather        ROS    No Known Allergies      Current Outpatient Medications:   •  Cartia  MG 24 hr capsule, TAKE ONE CAPSULE BY MOUTH DAILY, Disp: 90 capsule, Rfl: 2  •  diclofenac (VOLTAREN) 75 MG EC tablet, TAKE ONE TABLET BY MOUTH TWICE A DAY, Disp: 180 tablet, Rfl: 2  •  docusate sodium (COLACE) 100 MG capsule, Take 1 capsule by mouth 2 (Two) Times a Day As Needed for Constipation., Disp: 180 capsule, Rfl:  "3  •  furosemide (LASIX) 20 MG tablet, TAKE ONE TABLET BY MOUTH DAILY, Disp: 90 tablet, Rfl: 0  •  levothyroxine (SYNTHROID, LEVOTHROID) 75 MCG tablet, TAKE ONE TABLET BY MOUTH DAILY, Disp: 90 tablet, Rfl: 2  •  fluticasone (FLONASE) 50 MCG/ACT nasal spray, 2 sprays into the nostril(s) as directed by provider Daily., Disp: 3 bottle, Rfl: 3      Objective:     Vitals:    12/07/20 1356 12/07/20 1439   BP: 158/82 128/78   BP Location: Left arm    Pulse: 64    Weight: 82.7 kg (182 lb 6.4 oz)    Height: 162.6 cm (64\")      Body mass index is 31.31 kg/m².    Vitals signs reviewed.   Constitutional:       Appearance: Well-developed.   Eyes:      Conjunctiva/sclera: Conjunctivae normal.   HENT:      Head: Normocephalic.      Nose: Nose normal.         Comments: Masked  Neck:      Musculoskeletal: Normal range of motion.      Vascular: No JVD. JVD normal.   Pulmonary:      Effort: Pulmonary effort is normal.      Breath sounds: Normal breath sounds.   Cardiovascular:      Normal rate. Regularly irregular rhythm.      Murmurs: There is no murmur.   Pulses:     Intact distal pulses.   Edema:     Peripheral edema absent.   Abdominal:      Palpations: Abdomen is soft.      Tenderness: There is no abdominal tenderness.   Musculoskeletal: Normal range of motion.   Skin:     General: Skin is warm and dry.      Findings: No rash.   Neurological:      Mental Status: Alert, oriented to person, place, and time and oriented to person, place and time.      Cranial Nerves: No cranial nerve deficit.   Psychiatric:         Behavior: Behavior normal.         Thought Content: Thought content normal.         Judgment: Judgment normal.           ECG 12 Lead    Date/Time: 12/7/2020 2:24 PM  Performed by: Jeyson Mayberry MD  Authorized by: Jeyson Mayberry MD   Comparison: compared with previous ECG   Similar to previous ECG  Rhythm: sinus rhythm  Ectopy: unifocal PVCs and bigeminy  Conduction: conduction normal  ST Segments: ST segments normal  T " Waves: T waves normal  QRS axis: normal  Other: no other findings    Clinical impression: abnormal EKG            Assessment:       Diagnosis Plan   1. Premature ventricular contractions  ECG 12 Lead    Adult Transthoracic Echo Complete W/ Cont if Necessary Per Protocol   2. Atrial ectopy     3. Essential hypertension     4. Atherosclerosis of both carotid arteries  Duplex Carotid Ultrasound CAR      Plan:       1.  She has frequent monomorphic PVCs.  Today, she is in bigeminy.  It's asymptomatic. An echo in 2016 as well as one in 2017 were both normal. I'll repeat this to make sure we haven't seen any change in LVEF due to ectopy.    2.  She has a history of PACs and likely has PSVT.  She's doing well with diltiazem.  There is no true history of atrial fibrillation.    3.  Her BP was high upon arrival but she was nervous.  It was well controlled upon recheck.    4.  She had <15% plaque bilaterally in 2019; I'll repeat a duplex in 2021.     Sincerely,       Jeyson Mayberry MD

## 2021-01-06 ENCOUNTER — TELEPHONE (OUTPATIENT)
Dept: FAMILY MEDICINE CLINIC | Facility: CLINIC | Age: 76
End: 2021-01-06

## 2021-01-06 DIAGNOSIS — E03.8 OTHER SPECIFIED HYPOTHYROIDISM: ICD-10-CM

## 2021-01-06 DIAGNOSIS — E78.2 MIXED HYPERLIPIDEMIA: Primary | ICD-10-CM

## 2021-01-06 NOTE — TELEPHONE ENCOUNTER
Patient has appt on 1-18-21 and wants to know if she can come in the week before to get her labs drawn? Please call [phone]

## 2021-01-15 ENCOUNTER — TELEPHONE (OUTPATIENT)
Dept: FAMILY MEDICINE CLINIC | Facility: CLINIC | Age: 76
End: 2021-01-15

## 2021-01-15 NOTE — TELEPHONE ENCOUNTER
----- Message from Laura Muñoz MD sent at 1/15/2021 10:02 AM EST -----  Please let her know that her labs are stable and her cholesterol is a little improved.  We can discuss this further at her appointment.

## 2021-01-18 ENCOUNTER — OFFICE VISIT (OUTPATIENT)
Dept: FAMILY MEDICINE CLINIC | Facility: CLINIC | Age: 76
End: 2021-01-18

## 2021-01-18 VITALS
SYSTOLIC BLOOD PRESSURE: 126 MMHG | HEART RATE: 52 BPM | OXYGEN SATURATION: 98 % | DIASTOLIC BLOOD PRESSURE: 82 MMHG | BODY MASS INDEX: 31.07 KG/M2 | WEIGHT: 182 LBS | TEMPERATURE: 97.3 F | HEIGHT: 64 IN

## 2021-01-18 DIAGNOSIS — E03.9 ACQUIRED HYPOTHYROIDISM: ICD-10-CM

## 2021-01-18 DIAGNOSIS — I10 ESSENTIAL HYPERTENSION: ICD-10-CM

## 2021-01-18 DIAGNOSIS — M16.12 ARTHRITIS OF LEFT HIP: ICD-10-CM

## 2021-01-18 DIAGNOSIS — J30.1 SEASONAL ALLERGIC RHINITIS DUE TO POLLEN: ICD-10-CM

## 2021-01-18 DIAGNOSIS — R92.8 ABNORMAL MAMMOGRAM: ICD-10-CM

## 2021-01-18 DIAGNOSIS — K59.09 CHRONIC CONSTIPATION: ICD-10-CM

## 2021-01-18 DIAGNOSIS — E03.8 OTHER SPECIFIED HYPOTHYROIDISM: ICD-10-CM

## 2021-01-18 DIAGNOSIS — E78.2 MIXED HYPERLIPIDEMIA: Primary | ICD-10-CM

## 2021-01-18 DIAGNOSIS — I48.91 ATRIAL FIBRILLATION, UNSPECIFIED TYPE (HCC): ICD-10-CM

## 2021-01-18 DIAGNOSIS — I65.23 ATHEROSCLEROSIS OF BOTH CAROTID ARTERIES: ICD-10-CM

## 2021-01-18 PROCEDURE — 99214 OFFICE O/P EST MOD 30 MIN: CPT | Performed by: FAMILY MEDICINE

## 2021-01-18 RX ORDER — MONTELUKAST SODIUM 10 MG/1
10 TABLET ORAL NIGHTLY
Qty: 90 TABLET | Refills: 3 | Status: SHIPPED | OUTPATIENT
Start: 2021-01-18 | End: 2021-08-13 | Stop reason: SDUPTHER

## 2021-01-18 RX ORDER — DICLOFENAC SODIUM 75 MG/1
75 TABLET, DELAYED RELEASE ORAL 2 TIMES DAILY
Qty: 180 TABLET | Refills: 2 | Status: SHIPPED | OUTPATIENT
Start: 2021-01-18 | End: 2021-08-13 | Stop reason: SDUPTHER

## 2021-01-18 RX ORDER — DILTIAZEM HYDROCHLORIDE 240 MG/1
240 CAPSULE, COATED, EXTENDED RELEASE ORAL DAILY
Qty: 90 CAPSULE | Refills: 3 | Status: SHIPPED | OUTPATIENT
Start: 2021-01-18 | End: 2021-06-16 | Stop reason: SDUPTHER

## 2021-01-18 RX ORDER — LEVOTHYROXINE SODIUM 0.07 MG/1
75 TABLET ORAL DAILY
Qty: 90 TABLET | Refills: 3 | Status: SHIPPED | OUTPATIENT
Start: 2021-01-18 | End: 2021-07-15

## 2021-01-18 NOTE — PROGRESS NOTES
Subjective   Hillary Crenshaw is a 75 y.o. female.     Chief Complaint   Patient presents with   • Atrial Fibrillation     6 month recheck   • Hyperlipidemia   • Hypertension       hpi  Hypothyroidism- doing well, on meds,  had recent labs.      htn- doing well on meds     Hyperlipidemia-had recent lab that was a little high.  Is not on medication.  Has declined this.Working on diet.      Allergies- stable on meds but symptoms could be better asking for more allergy meds. Had tried her husbands meds and it really helped.      afib/atherosclerosis- stable and following with cardiology     OA- stable on meds, hip doing well with replacement, follows with ortho     Having some constipation mild but chronic. Colace has helped in the past.     Normal mammogram.  It is due for a follow-up mammogram in 2 months.    Having 17 on depression screen. Declines meds. Thinks its the weather and the state of the world.     The following portions of the patient's history were reviewed and updated as appropriate: allergies, current medications, past family history, past medical history, past social history, past surgical history and problem list.    Past Medical History:   Diagnosis Date   • Abnormal stool test    • Acute allergic rhinitis    • Acute sinusitis    • Allergic rhinitis    • Ankle pain    • Arthritis of left hip    • Atherosclerosis of both carotid arteries     2019: <15% bilaterally   • Atrial ectopy    • Boil, thigh    • Chronic constipation    • Gastritis    • GERD (gastroesophageal reflux disease)    • Head ache    • Hyperlipidemia    • Hypertension    • Hypervitaminosis D    • Hypothyroidism    • Lung nodule    • Osteoarthritis     left hip   • Osteopenia    • Pessary maintenance    • Post-menopause    • Premature ventricular contractions    • Prolapse of female bladder, acquired    • PSVT (paroxysmal supraventricular tachycardia) (CMS/HCC)    • Rectal bleeding    • Right foot pain    • Urine frequency    • UTI  "(urinary tract infection)        Past Surgical History:   Procedure Laterality Date   • FOOT SURGERY     • TOTAL HIP ARTHROPLASTY         Family History   Problem Relation Age of Onset   • Breast cancer Mother    • Heart attack Father    • Alcohol abuse Sister    • Arthritis Sister    • Diabetes Sister    • Hyperlipidemia Sister    • Osteoporosis Sister    • Thyroid disease Sister    • Alcohol abuse Brother    • Hypertension Brother    • Hyperlipidemia Brother    • Lung cancer Brother    • Heart attack Brother    • Bipolar disorder Maternal Grandmother    • Breast cancer Maternal Grandmother    • Depression Maternal Grandmother    • Other Other         cardiac disorder, neoplasm of breast   • No Known Problems Maternal Grandfather    • No Known Problems Paternal Grandmother    • No Known Problems Paternal Grandfather        Social History     Socioeconomic History   • Marital status:      Spouse name: Not on file   • Number of children: Not on file   • Years of education: Not on file   • Highest education level: Not on file   Tobacco Use   • Smoking status: Former Smoker     Quit date:      Years since quittin.0   • Smokeless tobacco: Never Used   Substance and Sexual Activity   • Alcohol use: Yes     Alcohol/week: 1.0 standard drinks     Types: 1 Glasses of wine per week     Comment: 7 drinks week/// Caffeine use: 2-3 cups daily   • Drug use: No   • Sexual activity: Defer       Review of Systems   Respiratory: Negative for shortness of breath.    Cardiovascular: Negative for chest pain.       Objective   Visit Vitals  /82   Pulse 52   Temp 97.3 °F (36.3 °C) (Infrared)   Ht 162.6 cm (64\")   Wt 82.6 kg (182 lb)   SpO2 98%   BMI 31.24 kg/m²     Body mass index is 31.24 kg/m².  Physical Exam   Constitutional: She is oriented to person, place, and time. She appears well-developed and well-nourished.   Cardiovascular: Normal rate, regular rhythm and normal heart sounds.   Pulmonary/Chest: Effort " normal and breath sounds normal.   Abdominal: Normal appearance.   Musculoskeletal: Normal range of motion.   Neurological: She is alert and oriented to person, place, and time.   Skin: Skin is warm and dry.   Psychiatric: Her behavior is normal. Mood normal.         Assessment/Plan   Diagnoses and all orders for this visit:    1. Mixed hyperlipidemia (Primary)    2. Seasonal allergic rhinitis due to pollen    3. Atherosclerosis of both carotid arteries    4. Essential hypertension    5. Other specified hypothyroidism    6. Chronic constipation    7. Abnormal mammogram  -     US Breast Right Limited; Future  -     Mammo Diagnostic Bilateral With CAD; Future    8. Acquired hypothyroidism  -     levothyroxine (SYNTHROID, LEVOTHROID) 75 MCG tablet; Take 1 tablet by mouth Daily.  Dispense: 90 tablet; Refill: 3    9. Atrial fibrillation, unspecified type (CMS/HCC)  -     dilTIAZem CD (Cartia XT) 240 MG 24 hr capsule; Take 1 capsule by mouth Daily.  Dispense: 90 capsule; Refill: 3    10. Arthritis of left hip  -     diclofenac (VOLTAREN) 75 MG EC tablet; Take 1 tablet by mouth 2 (Two) Times a Day.  Dispense: 180 tablet; Refill: 2    Other orders  -     montelukast (Singulair) 10 MG tablet; Take 1 tablet by mouth Every Night.  Dispense: 90 tablet; Refill: 3        Doing well, cont meds, f/u in 6 months and will get labs.

## 2021-03-08 ENCOUNTER — APPOINTMENT (OUTPATIENT)
Dept: ULTRASOUND IMAGING | Facility: HOSPITAL | Age: 76
End: 2021-03-08

## 2021-03-08 ENCOUNTER — HOSPITAL ENCOUNTER (OUTPATIENT)
Dept: MAMMOGRAPHY | Facility: HOSPITAL | Age: 76
End: 2021-03-08

## 2021-03-09 DIAGNOSIS — Z23 IMMUNIZATION DUE: ICD-10-CM

## 2021-03-23 DIAGNOSIS — I10 ESSENTIAL HYPERTENSION: ICD-10-CM

## 2021-03-23 RX ORDER — FUROSEMIDE 20 MG/1
20 TABLET ORAL DAILY
Qty: 90 TABLET | Refills: 1 | Status: SHIPPED | OUTPATIENT
Start: 2021-03-23 | End: 2021-06-16 | Stop reason: SDUPTHER

## 2021-04-12 ENCOUNTER — HOSPITAL ENCOUNTER (OUTPATIENT)
Dept: ULTRASOUND IMAGING | Facility: HOSPITAL | Age: 76
Discharge: HOME | End: 2021-04-12

## 2021-04-12 ENCOUNTER — HOSPITAL ENCOUNTER (OUTPATIENT)
Dept: MAMMOGRAPHY | Facility: HOSPITAL | Age: 76
Discharge: HOME | End: 2021-04-12

## 2021-04-12 DIAGNOSIS — R92.8 ABNORMAL MAMMOGRAM: ICD-10-CM

## 2021-04-12 PROCEDURE — 77066 DX MAMMO INCL CAD BI: CPT

## 2021-04-12 PROCEDURE — 76642 ULTRASOUND BREAST LIMITED: CPT

## 2021-04-12 PROCEDURE — G0279 TOMOSYNTHESIS, MAMMO: HCPCS

## 2021-05-20 ENCOUNTER — TELEPHONE (OUTPATIENT)
Dept: CARDIOLOGY | Facility: CLINIC | Age: 76
End: 2021-05-20

## 2021-05-20 NOTE — TELEPHONE ENCOUNTER
Pt called and reported that since picking up her recent rx for Diltiazem CD mg daily.  She has been feeling fatigued, she experiences some soa with exertion, but not to the point of not being able to work; however she reports that the soa interrupts her sleep.  She also c/o palps and swelling in her feet.  She reports that prior to picking up the rx she was taking the name brand Cartia XT , but the pharmacy has started giving her the generic.  She is not certain if this is correlated.  She has not been checking her bp or hr at home.      Next follow up 12/2021

## 2021-05-25 ENCOUNTER — OFFICE VISIT (OUTPATIENT)
Dept: CARDIOLOGY | Facility: CLINIC | Age: 76
End: 2021-05-25

## 2021-05-25 VITALS
WEIGHT: 186.8 LBS | HEIGHT: 64 IN | DIASTOLIC BLOOD PRESSURE: 80 MMHG | BODY MASS INDEX: 31.89 KG/M2 | SYSTOLIC BLOOD PRESSURE: 140 MMHG | HEART RATE: 65 BPM

## 2021-05-25 DIAGNOSIS — R06.09 DYSPNEA ON EXERTION: Primary | ICD-10-CM

## 2021-05-25 DIAGNOSIS — I65.23 ATHEROSCLEROSIS OF BOTH CAROTID ARTERIES: ICD-10-CM

## 2021-05-25 DIAGNOSIS — R00.2 PALPITATIONS: ICD-10-CM

## 2021-05-25 DIAGNOSIS — I49.1 ATRIAL ECTOPY: ICD-10-CM

## 2021-05-25 DIAGNOSIS — I10 ESSENTIAL HYPERTENSION: ICD-10-CM

## 2021-05-25 DIAGNOSIS — I49.3 PREMATURE VENTRICULAR CONTRACTIONS: ICD-10-CM

## 2021-05-25 PROBLEM — Z96.642 STATUS POST LEFT HIP REPLACEMENT: Status: RESOLVED | Noted: 2018-03-05 | Resolved: 2021-05-25

## 2021-05-25 PROBLEM — M16.12 ARTHRITIS OF LEFT HIP: Status: ACTIVE | Noted: 2018-01-23

## 2021-05-25 PROBLEM — N94.89 ENDOMETRIAL MASS: Status: ACTIVE | Noted: 2018-09-17

## 2021-05-25 PROBLEM — M16.12 ARTHRITIS OF LEFT HIP: Status: RESOLVED | Noted: 2018-01-23 | Resolved: 2021-05-25

## 2021-05-25 PROCEDURE — 93000 ELECTROCARDIOGRAM COMPLETE: CPT | Performed by: NURSE PRACTITIONER

## 2021-05-25 PROCEDURE — 99214 OFFICE O/P EST MOD 30 MIN: CPT | Performed by: NURSE PRACTITIONER

## 2021-05-25 NOTE — PROGRESS NOTES
Date of Office Visit: 2021  Encounter Provider: JUSTIN Anderson  Place of Service: [facility]  Patient Name: Hillary Crenshaw  :1945  Primary Cardiologist: Dr. Jeyson Mayberry    Chief Complaint   Patient presents with   • Palpitations   • Shortness of Breath   :     HPI: Hillary Crenshaw is a pleasant 75 y.o. female who presents today for evaluation of palpitations and shortness of breath.  I reviewed her medical records.    In 2016, she had an episode of syncope which was her second episode.  Both episodes were preceded by an abrupt onset of extremely rapid heartbeat.  Holter monitor showed frequent monomorphic PVCs with a 22% burden and occasional PACs.  She was given the diagnosis of atrial fibrillation, but Dr. Mayberry was not sure that she was ever truly diagnosed with atrial fibrillation on the EKG or telemetry strip.  Her symptoms were felt to be brought on by SVT.  She was placed on diltiazem.    In 2020, she saw Dr. Jeyson Mayberry to establish new cardiac care with her.  She was noted to be in ventricular bigeminy and was asymptomatic.  Dr. Mayberry recommended a repeat echocardiogram to see if there was any change in her ejection fraction due to the ectopy.  She was noted to have less than 15% carotid plaque bilaterally in  and was recommended to have a repeat carotid duplex in .    She presents today for evaluation of palpitations and dyspnea.  She has been experiencing dyspnea with exertion since 2021.  She thought it may be related to receiving the COVID-19 vaccination.  She also reports a cough in the morning which she feels is due to allergies.  On occasion she feels like her heart is racing.  She has noticed some swelling of her ankles, feet, and hands.  She denies chest pain, PND, orthopnea, dizziness, syncope, or bleeding.  Her blood pressure is borderline elevated.    Past Medical History:   Diagnosis Date   • Abnormal stool  test    • Acute allergic rhinitis    • Acute sinusitis    • Allergic rhinitis    • Ankle pain    • Arthritis of left hip    • Atherosclerosis of both carotid arteries     2019: <15% bilaterally   • Atrial ectopy    • Boil, thigh    • Chronic constipation    • Gastritis    • GERD (gastroesophageal reflux disease)    • Head ache    • Hyperlipidemia    • Hypertension    • Hypervitaminosis D    • Hypothyroidism    • Lung nodule    • Osteoarthritis     left hip   • Osteopenia    • Pessary maintenance    • Post-menopause    • Premature ventricular contractions    • Prolapse of female bladder, acquired    • PSVT (paroxysmal supraventricular tachycardia) (CMS/HCC)    • Rectal bleeding    • Right foot pain    • Urine frequency    • UTI (urinary tract infection)        Past Surgical History:   Procedure Laterality Date   • FOOT SURGERY     • TOTAL HIP ARTHROPLASTY         Social History     Socioeconomic History   • Marital status:      Spouse name: Not on file   • Number of children: Not on file   • Years of education: Not on file   • Highest education level: Not on file   Tobacco Use   • Smoking status: Former Smoker     Quit date:      Years since quittin.4   • Smokeless tobacco: Never Used   Substance and Sexual Activity   • Alcohol use: Yes     Alcohol/week: 1.0 standard drinks     Types: 1 Glasses of wine per week     Comment: 7 drinks week/// Caffeine use: 2-3 cups daily   • Drug use: No   • Sexual activity: Defer       Family History   Problem Relation Age of Onset   • Breast cancer Mother    • Heart attack Father    • Alcohol abuse Sister    • Arthritis Sister    • Diabetes Sister    • Hyperlipidemia Sister    • Osteoporosis Sister    • Thyroid disease Sister    • Alcohol abuse Brother    • Hypertension Brother    • Hyperlipidemia Brother    • Lung cancer Brother    • Heart attack Brother    • Bipolar disorder Maternal Grandmother    • Breast cancer Maternal Grandmother    • Depression Maternal  "Grandmother    • Other Other         cardiac disorder, neoplasm of breast   • No Known Problems Maternal Grandfather    • No Known Problems Paternal Grandmother    • No Known Problems Paternal Grandfather        The following portion of the patient's history were reviewed and updated as appropriate: past medical history, past surgical history, past social history, past family history, allergies, current medications, and problem list.    Review of Systems   Constitutional: Negative.   Cardiovascular: Positive for dyspnea on exertion, irregular heartbeat and palpitations.   Respiratory: Positive for cough and shortness of breath.    Hematologic/Lymphatic: Negative.    Neurological: Negative.        No Known Allergies      Current Outpatient Medications:   •  diclofenac (VOLTAREN) 75 MG EC tablet, Take 1 tablet by mouth 2 (Two) Times a Day., Disp: 180 tablet, Rfl: 2  •  dilTIAZem CD (Cartia XT) 240 MG 24 hr capsule, Take 1 capsule by mouth Daily., Disp: 90 capsule, Rfl: 3  •  docusate sodium (COLACE) 100 MG capsule, Take 1 capsule by mouth 2 (Two) Times a Day As Needed for Constipation., Disp: 180 capsule, Rfl: 3  •  fluticasone (FLONASE) 50 MCG/ACT nasal spray, 2 sprays into the nostril(s) as directed by provider Daily., Disp: 3 bottle, Rfl: 3  •  furosemide (LASIX) 20 MG tablet, Take 1 tablet by mouth Daily., Disp: 90 tablet, Rfl: 1  •  levothyroxine (SYNTHROID, LEVOTHROID) 75 MCG tablet, Take 1 tablet by mouth Daily., Disp: 90 tablet, Rfl: 3  •  montelukast (Singulair) 10 MG tablet, Take 1 tablet by mouth Every Night., Disp: 90 tablet, Rfl: 3        Objective:     Vitals:    05/25/21 0939 05/25/21 0945 05/25/21 1037   BP: 140/86 140/86 140/80   BP Location: Left arm Right arm Left arm   Patient Position:   Sitting   Pulse: 65     Weight: 84.7 kg (186 lb 12.8 oz)     Height: 162.6 cm (64\")       Body mass index is 32.06 kg/m².    PHYSICAL EXAM:    Vitals Reviewed.   General Appearance: No acute distress, well " developed and well nourished. Obese.   Eyes: Conjunctiva and lids: No erythema, swelling, or discharge. Sclera non-icteric.   HENT: Atraumatic, normocephalic. External eyes, ears, and nose normal. No hearing loss noted. Mucous membranes normal. Lips not cyanotic. Neck supple with no tenderness.  Respiratory: No signs of respiratory distress. Respiration rhythm and depth normal.   Clear to auscultation. No rales, crackles, rhonchi, or wheezing auscultated.   Cardiovascular:  Jugular Venous Pressure: Normal  Heart Rate and Rhythm: Normal rhythm with ectopy.  Heart Sounds: Normal S1 and S2. No S3 or S4 noted.  Murmurs: No murmurs noted. No rubs, thrills, or gallops.   Lower Extremities: No edema noted.  Gastrointestinal:  Abdomen soft, non-distended, non-tender. Normal bowel sounds.    Musculoskeletal: Normal movement of extremities  Skin and Nails: General appearance normal. No pallor, cyanosis, diaphoresis. Skin temperature normal. No clubbing of fingernails.   Psychiatric: Patient alert and oriented to person, place, and time. Speech and behavior appropriate. Normal mood and affect.       ECG 12 Lead    Date/Time: 5/25/2021 9:41 AM  Performed by: Rosemary Mccord APRN  Authorized by: Rosemary Mccord APRN   Comparison: compared with previous ECG from 12/7/2020  Similar to previous ECG  Rhythm: sinus rhythm  Ectopy: unifocal PVCs  Rate: normal  BPM: 65  Conduction: conduction normal  ST Segments: ST segments normal  T Waves: T waves normal  QRS axis: normal    Clinical impression: abnormal EKG              Assessment:       Diagnosis Plan   1. Dyspnea on exertion     2. Palpitations     3. Premature ventricular contractions  Holter Monitor - 24 Hour   4. Atrial ectopy     5. Essential hypertension     6. Atherosclerosis of both carotid arteries  Duplex Carotid Ultrasound CAR          Plan:       1/2.  Dyspnea and Palpitations: I recommended a 24-hour Holter monitor and echocardiogram to be completed.  In January  2021, her blood work was stable.    3/4.  PVCs and PACs: She was noted to have a 22% burden of PVCs on her last Holter monitor.  I recommended a repeat Holter monitor.  She remains on diltiazem.  Supposedly she has been asymptomatic in the past with palpitations, but is now experiencing more palpitations.    5.  Hypertension: Blood pressure borderline elevated.    6.  Carotid Artery Plaque: Repeat carotid duplex per Dr. Mayberry's recommendations.    7.  I will follow up with her with the test results.    As always, it has been a pleasure to participate in your patient's care. Thank you.       Sincerely,       JUSTIN Graham  [facility]      · COVID-19 Precautions - Patient was compliant in wearing a mask. When I saw the patient, I used appropriate personal protective equipment (PPE) including mask and eye shield (standard procedure).  Additionally, I used gown and gloves if indicated.  Hand hygiene was completed before and after seeing the patient.  · Dictated utilizing Dragon Dictation

## 2021-06-08 ENCOUNTER — HOSPITAL ENCOUNTER (OUTPATIENT)
Dept: CARDIOLOGY | Facility: HOSPITAL | Age: 76
Discharge: HOME | End: 2021-06-08

## 2021-06-08 ENCOUNTER — TELEPHONE (OUTPATIENT)
Dept: CARDIOLOGY | Facility: CLINIC | Age: 76
End: 2021-06-08

## 2021-06-08 VITALS
DIASTOLIC BLOOD PRESSURE: 60 MMHG | HEIGHT: 64 IN | SYSTOLIC BLOOD PRESSURE: 122 MMHG | HEART RATE: 60 BPM | BODY MASS INDEX: 31.41 KG/M2 | WEIGHT: 184 LBS

## 2021-06-08 DIAGNOSIS — R06.83 SNORING: ICD-10-CM

## 2021-06-08 DIAGNOSIS — G47.33 OSA (OBSTRUCTIVE SLEEP APNEA): ICD-10-CM

## 2021-06-08 DIAGNOSIS — I65.23 ATHEROSCLEROSIS OF BOTH CAROTID ARTERIES: ICD-10-CM

## 2021-06-08 DIAGNOSIS — I48.91 ATRIAL FIBRILLATION, UNSPECIFIED TYPE (HCC): ICD-10-CM

## 2021-06-08 DIAGNOSIS — I49.3 PREMATURE VENTRICULAR CONTRACTIONS: ICD-10-CM

## 2021-06-08 DIAGNOSIS — I27.20 PULMONARY HYPERTENSION (HCC): ICD-10-CM

## 2021-06-08 DIAGNOSIS — I10 ESSENTIAL HYPERTENSION: ICD-10-CM

## 2021-06-08 DIAGNOSIS — I36.1 NONRHEUMATIC TRICUSPID VALVE REGURGITATION: Primary | ICD-10-CM

## 2021-06-08 LAB
AORTIC ARCH: 3.5 CM
ASCENDING AORTA: 3.3 CM
BH CV ECHO MEAS - ACS: 2.3 CM
BH CV ECHO MEAS - AO ARCH DIAM (PROXIMAL TRANS.): 3.5 CM
BH CV ECHO MEAS - AO MAX PG (FULL): 3.1 MMHG
BH CV ECHO MEAS - AO MAX PG: 8.8 MMHG
BH CV ECHO MEAS - AO MEAN PG (FULL): 1.8 MMHG
BH CV ECHO MEAS - AO MEAN PG: 4.8 MMHG
BH CV ECHO MEAS - AO ROOT AREA (BSA CORRECTED): 1.6
BH CV ECHO MEAS - AO ROOT AREA: 7.6 CM^2
BH CV ECHO MEAS - AO ROOT DIAM: 3.1 CM
BH CV ECHO MEAS - AO V2 MAX: 148 CM/SEC
BH CV ECHO MEAS - AO V2 MEAN: 101.4 CM/SEC
BH CV ECHO MEAS - AO V2 VTI: 35.6 CM
BH CV ECHO MEAS - ASC AORTA: 3.3 CM
BH CV ECHO MEAS - AVA(I,A): 1.9 CM^2
BH CV ECHO MEAS - AVA(I,D): 1.9 CM^2
BH CV ECHO MEAS - AVA(V,A): 2.1 CM^2
BH CV ECHO MEAS - AVA(V,D): 2.1 CM^2
BH CV ECHO MEAS - BSA(HAYCOCK): 2 M^2
BH CV ECHO MEAS - BSA: 1.9 M^2
BH CV ECHO MEAS - BZI_BMI: 31.6 KILOGRAMS/M^2
BH CV ECHO MEAS - BZI_METRIC_HEIGHT: 162.6 CM
BH CV ECHO MEAS - BZI_METRIC_WEIGHT: 83.5 KG
BH CV ECHO MEAS - EDV(MOD-SP2): 36 ML
BH CV ECHO MEAS - EDV(MOD-SP4): 51 ML
BH CV ECHO MEAS - EDV(TEICH): 102.5 ML
BH CV ECHO MEAS - EF(CUBED): 68.4 %
BH CV ECHO MEAS - EF(MOD-BP): 53.2 %
BH CV ECHO MEAS - EF(MOD-SP2): 52.8 %
BH CV ECHO MEAS - EF(MOD-SP4): 54.9 %
BH CV ECHO MEAS - EF(TEICH): 60 %
BH CV ECHO MEAS - ESV(MOD-SP2): 17 ML
BH CV ECHO MEAS - ESV(MOD-SP4): 23 ML
BH CV ECHO MEAS - ESV(TEICH): 41 ML
BH CV ECHO MEAS - FS: 31.9 %
BH CV ECHO MEAS - IVS/LVPW: 1.1
BH CV ECHO MEAS - IVSD: 1 CM
BH CV ECHO MEAS - LAT PEAK E' VEL: 8.1 CM/SEC
BH CV ECHO MEAS - LV DIASTOLIC VOL/BSA (35-75): 27 ML/M^2
BH CV ECHO MEAS - LV MASS(C)D: 154.7 GRAMS
BH CV ECHO MEAS - LV MASS(C)DI: 81.9 GRAMS/M^2
BH CV ECHO MEAS - LV MAX PG: 5.7 MMHG
BH CV ECHO MEAS - LV MEAN PG: 2.9 MMHG
BH CV ECHO MEAS - LV SYSTOLIC VOL/BSA (12-30): 12.2 ML/M^2
BH CV ECHO MEAS - LV V1 MAX: 119.4 CM/SEC
BH CV ECHO MEAS - LV V1 MEAN: 77.9 CM/SEC
BH CV ECHO MEAS - LV V1 VTI: 26.3 CM
BH CV ECHO MEAS - LVIDD: 4.7 CM
BH CV ECHO MEAS - LVIDS: 3.2 CM
BH CV ECHO MEAS - LVLD AP2: 6.2 CM
BH CV ECHO MEAS - LVLD AP4: 6.9 CM
BH CV ECHO MEAS - LVLS AP2: 4.7 CM
BH CV ECHO MEAS - LVLS AP4: 5.4 CM
BH CV ECHO MEAS - LVOT AREA (M): 2.5 CM^2
BH CV ECHO MEAS - LVOT AREA: 2.6 CM^2
BH CV ECHO MEAS - LVOT DIAM: 1.8 CM
BH CV ECHO MEAS - LVPWD: 0.9 CM
BH CV ECHO MEAS - MED PEAK E' VEL: 5.8 CM/SEC
BH CV ECHO MEAS - MR MAX PG: 98.4 MMHG
BH CV ECHO MEAS - MR MAX VEL: 496.1 CM/SEC
BH CV ECHO MEAS - MV A DUR: 0.1 SEC
BH CV ECHO MEAS - MV A MAX VEL: 85.1 CM/SEC
BH CV ECHO MEAS - MV DEC SLOPE: 790 CM/SEC^2
BH CV ECHO MEAS - MV DEC TIME: 0.18 SEC
BH CV ECHO MEAS - MV E MAX VEL: 83.2 CM/SEC
BH CV ECHO MEAS - MV E/A: 0.98
BH CV ECHO MEAS - MV MAX PG: 4.5 MMHG
BH CV ECHO MEAS - MV MEAN PG: 2 MMHG
BH CV ECHO MEAS - MV P1/2T MAX VEL: 111.3 CM/SEC
BH CV ECHO MEAS - MV P1/2T: 41.3 MSEC
BH CV ECHO MEAS - MV V2 MAX: 105.9 CM/SEC
BH CV ECHO MEAS - MV V2 MEAN: 66 CM/SEC
BH CV ECHO MEAS - MV V2 VTI: 23.9 CM
BH CV ECHO MEAS - MVA P1/2T LCG: 2 CM^2
BH CV ECHO MEAS - MVA(P1/2T): 5.3 CM^2
BH CV ECHO MEAS - MVA(VTI): 2.9 CM^2
BH CV ECHO MEAS - PA MAX PG (FULL): 1.9 MMHG
BH CV ECHO MEAS - PA MAX PG: 3.5 MMHG
BH CV ECHO MEAS - PA V2 MAX: 93.1 CM/SEC
BH CV ECHO MEAS - PVA(V,A): 2.6 CM^2
BH CV ECHO MEAS - PVA(V,D): 2.6 CM^2
BH CV ECHO MEAS - QP/QS: 0.89
BH CV ECHO MEAS - RAP SYSTOLE: 15 MMHG
BH CV ECHO MEAS - RV MAX PG: 1.5 MMHG
BH CV ECHO MEAS - RV MEAN PG: 1 MMHG
BH CV ECHO MEAS - RV V1 MAX: 62.1 CM/SEC
BH CV ECHO MEAS - RV V1 MEAN: 48.2 CM/SEC
BH CV ECHO MEAS - RV V1 VTI: 15.7 CM
BH CV ECHO MEAS - RVOT AREA: 3.9 CM^2
BH CV ECHO MEAS - RVOT DIAM: 2.2 CM
BH CV ECHO MEAS - RVSP: 49.2 MMHG
BH CV ECHO MEAS - SI(AO): 143.4 ML/M^2
BH CV ECHO MEAS - SI(CUBED): 37.7 ML/M^2
BH CV ECHO MEAS - SI(LVOT): 36.6 ML/M^2
BH CV ECHO MEAS - SI(MOD-SP2): 10.1 ML/M^2
BH CV ECHO MEAS - SI(MOD-SP4): 14.8 ML/M^2
BH CV ECHO MEAS - SI(TEICH): 32.6 ML/M^2
BH CV ECHO MEAS - SUP REN AO DIAM: 2.4 CM
BH CV ECHO MEAS - SV(AO): 270.8 ML
BH CV ECHO MEAS - SV(CUBED): 71.2 ML
BH CV ECHO MEAS - SV(LVOT): 69 ML
BH CV ECHO MEAS - SV(MOD-SP2): 19 ML
BH CV ECHO MEAS - SV(MOD-SP4): 28 ML
BH CV ECHO MEAS - SV(RVOT): 61.5 ML
BH CV ECHO MEAS - SV(TEICH): 61.5 ML
BH CV ECHO MEAS - TAPSE (>1.6): 1.7 CM
BH CV ECHO MEAS - TR MAX VEL: 292.4 CM/SEC
BH CV ECHO MEASUREMENTS AVERAGE E/E' RATIO: 11.97
BH CV XLRA - RV BASE: 3.6 CM
BH CV XLRA - RV LENGTH: 6 CM
BH CV XLRA - RV MID: 2.8 CM
BH CV XLRA - TDI S': 14.7 CM/SEC
BH CV XLRA MEAS LEFT DIST CCA EDV: -10.4 CM/SEC
BH CV XLRA MEAS LEFT DIST CCA PSV: -54.9 CM/SEC
BH CV XLRA MEAS LEFT DIST ICA EDV: -23.6 CM/SEC
BH CV XLRA MEAS LEFT DIST ICA PSV: -75.2 CM/SEC
BH CV XLRA MEAS LEFT ICA/CCA RATIO: 1.4
BH CV XLRA MEAS LEFT MID CCA EDV: -17.6 CM/SEC
BH CV XLRA MEAS LEFT MID CCA PSV: -91.1 CM/SEC
BH CV XLRA MEAS LEFT MID ICA EDV: -18.7 CM/SEC
BH CV XLRA MEAS LEFT MID ICA PSV: -68.6 CM/SEC
BH CV XLRA MEAS LEFT PROX CCA EDV: -9.8 CM/SEC
BH CV XLRA MEAS LEFT PROX CCA PSV: -73.8 CM/SEC
BH CV XLRA MEAS LEFT PROX ECA PSV: -60.2 CM/SEC
BH CV XLRA MEAS LEFT PROX ICA EDV: -13.2 CM/SEC
BH CV XLRA MEAS LEFT PROX ICA PSV: -51 CM/SEC
BH CV XLRA MEAS LEFT PROX SCLA PSV: 97.4 CM/SEC
BH CV XLRA MEAS LEFT VERTEBRAL A PSV: -39.5 CM/SEC
BH CV XLRA MEAS RIGHT DIST CCA EDV: -12.4 CM/SEC
BH CV XLRA MEAS RIGHT DIST CCA PSV: -41.6 CM/SEC
BH CV XLRA MEAS RIGHT DIST ICA EDV: -22 CM/SEC
BH CV XLRA MEAS RIGHT DIST ICA PSV: -97.4 CM/SEC
BH CV XLRA MEAS RIGHT ICA/CCA RATIO: 1.7
BH CV XLRA MEAS RIGHT MID CCA EDV: 8.7 CM/SEC
BH CV XLRA MEAS RIGHT MID CCA PSV: 47.8 CM/SEC
BH CV XLRA MEAS RIGHT MID ICA EDV: -27.3 CM/SEC
BH CV XLRA MEAS RIGHT MID ICA PSV: -121.4 CM/SEC
BH CV XLRA MEAS RIGHT PROX CCA EDV: 16.2 CM/SEC
BH CV XLRA MEAS RIGHT PROX CCA PSV: 65.9 CM/SEC
BH CV XLRA MEAS RIGHT PROX ECA PSV: -81.4 CM/SEC
BH CV XLRA MEAS RIGHT PROX ICA EDV: -12.2 CM/SEC
BH CV XLRA MEAS RIGHT PROX ICA PSV: -40.2 CM/SEC
BH CV XLRA MEAS RIGHT PROX SCLA PSV: 55.7 CM/SEC
BH CV XLRA MEAS RIGHT VERTEBRAL A PSV: -40.1 CM/SEC
LEFT ATRIUM VOLUME INDEX: 37 ML/M2
LEFT ATRIUM VOLUME: 70 CM3
MAXIMAL PREDICTED HEART RATE: 145 BPM
MAXIMAL PREDICTED HEART RATE: 145 BPM
SINUS: 2.7 CM
STJ: 3 CM
STRESS TARGET HR: 123 BPM
STRESS TARGET HR: 123 BPM

## 2021-06-08 PROCEDURE — 93880 EXTRACRANIAL BILAT STUDY: CPT

## 2021-06-08 PROCEDURE — 93306 TTE W/DOPPLER COMPLETE: CPT | Performed by: INTERNAL MEDICINE

## 2021-06-08 PROCEDURE — 93880 EXTRACRANIAL BILAT STUDY: CPT | Performed by: INTERNAL MEDICINE

## 2021-06-08 PROCEDURE — 93306 TTE W/DOPPLER COMPLETE: CPT

## 2021-06-09 NOTE — TELEPHONE ENCOUNTER
Result Text  · Calculated left ventricular EF = 53.2% Estimated left ventricular EF was in agreement with the calculated left ventricular EF. Left ventricular systolic function is normal.  · Left ventricular diastolic function is consistent with (grade I) impaired relaxation.  · Left atrial volume is moderately increased.  · Mild mitral valve regurgitation is present.  · Moderate tricuspid valve regurgitation is present.  · Estimated right ventricular systolic pressure from tricuspid regurgitation is moderately elevated (45-55 mmHg). Calculated right ventricular systolic pressure from tricuspid regurgitation is 49.2 mmHg.    Reviewed echocardiogram today.  She is experiencing shortness of breath.  I will recommend increasing furosemide to 40 mg daily and a sleep apnea consultation.    Holter Study Description    Monitor placed on patient by [facility] on 6/8/2021  at 11:34 EDT.  The monitor was scanned on 6/10/2021. The patient was monitored for 23 hours and 59 minutes. Indications for this exam include premature Ventricular Tachycardia. Average HR:  66. Min HR:  52. Max HR:  92.   Study Findings    No symptoms reported during the monitoring period. The predominant rhythm noted during the testing period was sinus rhythm. Premature atrial contractions occured occasionally. Evidence of non-specified atrial arrhythmias was noted. There were occasional runs of what appeared to be atrial tachycardia.  The longest was 11 beats at a maximum rate of 140 bpm. Premature ventricular contractions occured frequently. There were 1971 premature ventricular contractions per hour. Premature ventricular ectopics or a mixed variety.  They were occasionally single but there were frequent couplets and occasional triplets.  Nonsustained ventricular tachycardia or idioventricular rhythm was noted.  The longest run was 7 beats, the fastest rate was 128 bpm.  There were no sustained runs of ventricular tachycardia.  PVCs accounted for almost 50%  of the recording.. Sinoatrial node conduction was normal. No atrioventricular block noted.

## 2021-06-16 PROBLEM — I36.1 NONRHEUMATIC TRICUSPID VALVE REGURGITATION: Status: ACTIVE | Noted: 2021-06-16

## 2021-06-16 RX ORDER — DILTIAZEM HYDROCHLORIDE 360 MG/1
360 CAPSULE, EXTENDED RELEASE ORAL DAILY
Qty: 90 CAPSULE | Refills: 0 | Status: SHIPPED | OUTPATIENT
Start: 2021-06-16 | End: 2021-07-19 | Stop reason: SDUPTHER

## 2021-06-16 RX ORDER — FUROSEMIDE 40 MG/1
40 TABLET ORAL DAILY
Qty: 90 TABLET | Refills: 0 | Status: SHIPPED | OUTPATIENT
Start: 2021-06-16 | End: 2021-07-19 | Stop reason: SDUPTHER

## 2021-06-16 NOTE — TELEPHONE ENCOUNTER
I spoke with patient about test results & she verbalizes understanding.  I recommend increasing her furosemide to 40 mg daily to help with her shortness of breath and increasing the diltiazem to 360 mg daily for the palpitations.    I have referred her to [facility] sleep medicine.    Sol-please arrange a 4-week follow-up visit with Dr. Mayberry or myself.  Thank you

## 2021-07-12 ENCOUNTER — TELEPHONE (OUTPATIENT)
Dept: FAMILY MEDICINE CLINIC | Facility: CLINIC | Age: 76
End: 2021-07-12

## 2021-07-12 DIAGNOSIS — E03.8 OTHER SPECIFIED HYPOTHYROIDISM: ICD-10-CM

## 2021-07-12 DIAGNOSIS — E78.2 MIXED HYPERLIPIDEMIA: Primary | ICD-10-CM

## 2021-07-12 NOTE — TELEPHONE ENCOUNTER
APPT on 8/13/21 & wants labs done on 8/6/21 so that they will be available to discuss at her appt, can her lab orders be put in?

## 2021-07-14 DIAGNOSIS — J30.2 SEASONAL ALLERGIC RHINITIS, UNSPECIFIED TRIGGER: ICD-10-CM

## 2021-07-14 DIAGNOSIS — E03.9 ACQUIRED HYPOTHYROIDISM: ICD-10-CM

## 2021-07-15 RX ORDER — LEVOTHYROXINE SODIUM 75 MCG
75 TABLET ORAL DAILY
COMMUNITY
End: 2021-07-15 | Stop reason: SDUPTHER

## 2021-07-15 NOTE — TELEPHONE ENCOUNTER
LOV 1/18/21  NOV 8/13/21  Labs 1/13/21  Last RF 5/20/21    Pt says she wants name brand synthroid not the generic

## 2021-07-16 RX ORDER — FLUTICASONE PROPIONATE 50 MCG
2 SPRAY, SUSPENSION (ML) NASAL DAILY
Qty: 48 G | Refills: 3 | Status: SHIPPED | OUTPATIENT
Start: 2021-07-16 | End: 2021-08-13 | Stop reason: SDUPTHER

## 2021-07-16 RX ORDER — LEVOTHYROXINE SODIUM 75 MCG
75 TABLET ORAL DAILY
Qty: 90 TABLET | Refills: 3 | Status: SHIPPED | OUTPATIENT
Start: 2021-07-16 | End: 2021-08-13 | Stop reason: SDUPTHER

## 2021-07-19 ENCOUNTER — LAB (OUTPATIENT)
Dept: LAB | Facility: HOSPITAL | Age: 76
End: 2021-07-19

## 2021-07-19 ENCOUNTER — OFFICE VISIT (OUTPATIENT)
Dept: CARDIOLOGY | Facility: CLINIC | Age: 76
End: 2021-07-19

## 2021-07-19 VITALS
HEART RATE: 70 BPM | SYSTOLIC BLOOD PRESSURE: 142 MMHG | DIASTOLIC BLOOD PRESSURE: 72 MMHG | HEIGHT: 64 IN | WEIGHT: 187.4 LBS | BODY MASS INDEX: 31.99 KG/M2

## 2021-07-19 DIAGNOSIS — I27.20 PULMONARY HYPERTENSION (HCC): ICD-10-CM

## 2021-07-19 DIAGNOSIS — I36.1 NONRHEUMATIC TRICUSPID VALVE REGURGITATION: ICD-10-CM

## 2021-07-19 DIAGNOSIS — G47.33 OSA (OBSTRUCTIVE SLEEP APNEA): ICD-10-CM

## 2021-07-19 DIAGNOSIS — I51.89 GRADE I DIASTOLIC DYSFUNCTION: ICD-10-CM

## 2021-07-19 DIAGNOSIS — R06.09 DYSPNEA ON EXERTION: Primary | ICD-10-CM

## 2021-07-19 DIAGNOSIS — I49.3 PVCS (PREMATURE VENTRICULAR CONTRACTIONS): ICD-10-CM

## 2021-07-19 DIAGNOSIS — E66.09 CLASS 1 OBESITY DUE TO EXCESS CALORIES WITHOUT SERIOUS COMORBIDITY WITH BODY MASS INDEX (BMI) OF 32.0 TO 32.9 IN ADULT: ICD-10-CM

## 2021-07-19 DIAGNOSIS — I49.1 ATRIAL ECTOPY: ICD-10-CM

## 2021-07-19 DIAGNOSIS — R06.09 DYSPNEA ON EXERTION: ICD-10-CM

## 2021-07-19 DIAGNOSIS — E78.2 MIXED HYPERLIPIDEMIA: ICD-10-CM

## 2021-07-19 DIAGNOSIS — I65.23 ATHEROSCLEROSIS OF BOTH CAROTID ARTERIES: ICD-10-CM

## 2021-07-19 DIAGNOSIS — I10 ESSENTIAL HYPERTENSION: ICD-10-CM

## 2021-07-19 DIAGNOSIS — I48.91 ATRIAL FIBRILLATION, UNSPECIFIED TYPE (HCC): ICD-10-CM

## 2021-07-19 DIAGNOSIS — R00.2 PALPITATIONS: ICD-10-CM

## 2021-07-19 LAB
ANION GAP SERPL CALCULATED.3IONS-SCNC: 10 MMOL/L (ref 5–15)
BUN SERPL-MCNC: 13 MG/DL (ref 8–23)
BUN/CREAT SERPL: 18.6 (ref 7–25)
CALCIUM SPEC-SCNC: 9.8 MG/DL (ref 8.6–10.5)
CHLORIDE SERPL-SCNC: 104 MMOL/L (ref 98–107)
CO2 SERPL-SCNC: 25 MMOL/L (ref 22–29)
CREAT SERPL-MCNC: 0.7 MG/DL (ref 0.57–1)
GFR SERPL CREATININE-BSD FRML MDRD: 82 ML/MIN/1.73
GLUCOSE SERPL-MCNC: 100 MG/DL (ref 65–99)
NT-PROBNP SERPL-MCNC: 143.7 PG/ML (ref 0–1800)
POTASSIUM SERPL-SCNC: 4.3 MMOL/L (ref 3.5–5.2)
SODIUM SERPL-SCNC: 139 MMOL/L (ref 136–145)

## 2021-07-19 PROCEDURE — 80048 BASIC METABOLIC PNL TOTAL CA: CPT

## 2021-07-19 PROCEDURE — 83880 ASSAY OF NATRIURETIC PEPTIDE: CPT

## 2021-07-19 PROCEDURE — 93000 ELECTROCARDIOGRAM COMPLETE: CPT | Performed by: NURSE PRACTITIONER

## 2021-07-19 PROCEDURE — 36415 COLL VENOUS BLD VENIPUNCTURE: CPT

## 2021-07-19 PROCEDURE — 99214 OFFICE O/P EST MOD 30 MIN: CPT | Performed by: NURSE PRACTITIONER

## 2021-07-19 RX ORDER — DILTIAZEM HYDROCHLORIDE 360 MG/1
360 CAPSULE, EXTENDED RELEASE ORAL DAILY
Qty: 90 CAPSULE | Refills: 3 | Status: SHIPPED | OUTPATIENT
Start: 2021-07-19 | End: 2021-08-13 | Stop reason: SDUPTHER

## 2021-07-19 RX ORDER — FUROSEMIDE 40 MG/1
40 TABLET ORAL DAILY
Qty: 90 TABLET | Refills: 3 | Status: SHIPPED | OUTPATIENT
Start: 2021-07-19 | End: 2021-08-13 | Stop reason: SDUPTHER

## 2021-07-19 NOTE — PROGRESS NOTES
Date of Office Visit: 2021  Encounter Provider: JUSTIN Anderson  Place of Service: [facility] CARDIOLOGY  Patient Name: Hillary Crenshaw  :1945  Primary Cardiologist: Dr. Jeyson Mayberry    Chief Complaint   Patient presents with   • Shortness of Breath   • Palpitations   • Follow-up   :     HPI: Hillary Crenshaw is a pleasant 75 y.o. female who presents today for follow up on palpitations and shortness of breath.  I have reviewed her medical records.    In 2016, she had an episode of syncope which was her second episode.  Both episodes were preceded by an abrupt onset of extremely rapid heartbeat.  Holter monitor showed frequent monomorphic PVCs with a 22% burden and occasional PACs. She was given the diagnosis of atrial fibrillation, but Dr. Mayberry was not sure that she was ever truly diagnosed with atrial fibrillation on the EKG or telemetry strip.  Her symptoms were felt to be brought on by SVT and she was placed on diltiazem.    In 2020, she established care with Dr. Jeyson Mayberry. She was noted to be in asymptomatic ventricular bigeminy and was asymptomatic.  Dr. Mayberry recommended a repeat echocardiogram, but this was not done. She was noted to have less than 15% carotid plaque bilaterally in  and was recommended to have a repeat carotid duplex in .    In May 2021, she saw me in the office for evaluation of palpitations, tachycardia, edema, and cough.  She thought it may be related to receiving the COVID-19 vaccination. I ordered an echocardiogram which showed an LVEF of 53%, grade 1 diastolic dysfunction, left atrial volume moderately increased, mild mitral valve regurgitation, moderate tricuspid regurgitation, and moderate pulmonary hypertension with an RVSP of 49.2 mmHg.  Based on the increased left atrial volume and shortness of breath, I increased her furosemide to 40 mg daily.  She has untreated sleep apnea and I recommended a sleep apnea  "consultation.    She also wore a 24-hour Holter monitor which showed normal sinus rhythm with an average heart rate of 66 bpm, occasional PACs, occasional runs of atrial tachycardia, frequent PVCs (1971 PVCs per hour-50% burden), and brief nonsustained ventricular tachycardia the longest 7 beats in duration at a rate of 128 bpm.  I increased her diltiazem to 360 mg daily. Carotid duplex showed mild right stenosis, left plaque, and bilateral thyroid cyst.    She presents today for her follow-up visit.  She was recently in [place] and really had a hard time breathing with the altitude.  She remains on the increased dose of furosemide and diltiazem and overall feels \"fabulous\".  She has very hesitant to go for the sleep apnea evaluation.  She tried wearing a CPAP machine once before and did not tolerate it because of claustrophobia.  She denies chest pain, shortness of air, palpitations, edema, dizziness, syncope, or bleeding.  Blood pressure is borderline elevated and her weight is up 3 pounds from last visit.  We reviewed all of her cardiac testing during today's appointment.      Past Medical History:   Diagnosis Date   • Abnormal stool test    • Acute allergic rhinitis    • Acute sinusitis    • Allergic rhinitis    • Ankle pain    • Arthritis of left hip    • Atherosclerosis of both carotid arteries     2019: <15% bilaterally   • Atrial ectopy    • Boil, thigh    • Chronic constipation    • Gastritis    • GERD (gastroesophageal reflux disease)    • Grade I diastolic dysfunction 6/1/2021   • Head ache    • Hyperlipidemia    • Hypertension    • Hypervitaminosis D    • Hypothyroidism    • Lung nodule    • Osteoarthritis     left hip   • Osteopenia    • Pessary maintenance    • Post-menopause    • Premature ventricular contractions    • Prolapse of female bladder, acquired    • PSVT (paroxysmal supraventricular tachycardia) (CMS/HCC)    • Pulmonary hypertension (CMS/HCC)     Severe per echo June 2021   • Rectal " bleeding    • Right foot pain    • Urine frequency    • UTI (urinary tract infection)        Past Surgical History:   Procedure Laterality Date   • FOOT SURGERY     • TOTAL HIP ARTHROPLASTY         Social History     Socioeconomic History   • Marital status:      Spouse name: Not on file   • Number of children: Not on file   • Years of education: Not on file   • Highest education level: Not on file   Tobacco Use   • Smoking status: Former Smoker     Quit date:      Years since quittin.5   • Smokeless tobacco: Never Used   Substance and Sexual Activity   • Alcohol use: Yes     Alcohol/week: 1.0 standard drinks     Types: 1 Glasses of wine per week     Comment: 7 drinks week/// Caffeine use: 2-3 cups daily   • Drug use: No   • Sexual activity: Defer       Family History   Problem Relation Age of Onset   • Breast cancer Mother    • Heart attack Father    • Alcohol abuse Sister    • Arthritis Sister    • Diabetes Sister    • Hyperlipidemia Sister    • Osteoporosis Sister    • Thyroid disease Sister    • Alcohol abuse Brother    • Hypertension Brother    • Hyperlipidemia Brother    • Lung cancer Brother    • Heart attack Brother    • Bipolar disorder Maternal Grandmother    • Breast cancer Maternal Grandmother    • Depression Maternal Grandmother    • Other Other         cardiac disorder, neoplasm of breast   • No Known Problems Maternal Grandfather    • No Known Problems Paternal Grandmother    • No Known Problems Paternal Grandfather        The following portion of the patient's history were reviewed and updated as appropriate: past medical history, past surgical history, past social history, past family history, allergies, current medications, and problem list.    Review of Systems   Constitutional: Positive for weight gain.   Cardiovascular: Negative.  Negative for dyspnea on exertion, irregular heartbeat and palpitations.   Respiratory: Negative.  Negative for cough and shortness of breath.   "  Hematologic/Lymphatic: Bruises/bleeds easily.   Neurological: Negative.        No Known Allergies      Current Outpatient Medications:   •  diclofenac (VOLTAREN) 75 MG EC tablet, Take 1 tablet by mouth 2 (Two) Times a Day., Disp: 180 tablet, Rfl: 2  •  dilTIAZem CD (CARDIZEM CD) 360 MG 24 hr capsule, Take 1 capsule by mouth Daily., Disp: 90 capsule, Rfl: 3  •  docusate sodium (COLACE) 100 MG capsule, Take 1 capsule by mouth 2 (Two) Times a Day As Needed for Constipation., Disp: 180 capsule, Rfl: 3  •  fluticasone (FLONASE) 50 MCG/ACT nasal spray, 2 sprays by Each Nare route Daily., Disp: 48 g, Rfl: 3  •  furosemide (LASIX) 40 MG tablet, Take 1 tablet by mouth Daily., Disp: 90 tablet, Rfl: 3  •  montelukast (Singulair) 10 MG tablet, Take 1 tablet by mouth Every Night., Disp: 90 tablet, Rfl: 3  •  Synthroid 75 MCG tablet, Take 1 tablet by mouth Daily. TAKE ONE TABLET DAILY, Disp: 90 tablet, Rfl: 3        Objective:     Vitals:    07/19/21 0814 07/19/21 0821   BP: 140/70 142/72   BP Location: Left arm Right arm   Pulse: 70    Weight: 85 kg (187 lb 6.4 oz)    Height: 162.6 cm (64\")      Body mass index is 32.17 kg/m².    PHYSICAL EXAM:    Vitals Reviewed.   General Appearance: No acute distress, well developed and well nourished. Obese.   Eyes: Conjunctiva and lids: No erythema, swelling, or discharge. Sclera non-icteric.   HENT: Atraumatic, normocephalic. External eyes, ears, and nose normal. No hearing loss noted. Mucous membranes normal. Lips not cyanotic. Neck supple with no tenderness.  Respiratory: No signs of respiratory distress. Respiration rhythm and depth normal.   Clear to auscultation. No rales, crackles, rhonchi, or wheezing auscultated.   Cardiovascular:  Jugular Venous Pressure: Normal  Heart Rate and Rhythm: Normal rhythm with ectopy.  Heart Sounds: Normal S1 and S2.  No S3 or S4 noted.  Murmurs: No murmurs noted. No rubs, thrills, or gallops.   Lower Extremities: Bilateral trace lower extremity edema " noted.  Gastrointestinal:  Abdomen soft, non-distended, non-tender. Normal bowel sounds.    Musculoskeletal: Normal movement of extremities  Skin and Nails: General appearance normal. No pallor, cyanosis, diaphoresis. Skin temperature normal. No clubbing of fingernails.   Psychiatric: Patient alert and oriented to person, place, and time. Speech and behavior appropriate. Normal mood and affect.       ECG 12 Lead    Date/Time: 7/19/2021 8:14 AM  Performed by: Rosemary Mccord APRN  Authorized by: Rosemary Mccord APRN   Comparison: compared with previous ECG from 5/25/2021  Similar to previous ECG  Rhythm: sinus rhythm  Ectopy: unifocal PVCs  Rate: normal  BPM: 70  Conduction: conduction normal  ST Segments: ST segments normal  T Waves: T waves normal  QRS axis: normal  Other: no other findings    Clinical impression: abnormal EKG              Assessment:       Diagnosis Plan   1. Dyspnea on exertion  proBNP   2. Palpitations     3. PVCs (premature ventricular contractions)  Basic Metabolic Panel   4. Atrial ectopy     5. Grade I diastolic dysfunction     6. Pulmonary hypertension (CMS/HCC)     7. Nonrheumatic tricuspid valve regurgitation     8. TARI (obstructive sleep apnea)     9. Atherosclerosis of both carotid arteries     10. Mixed hyperlipidemia     11. Essential hypertension  furosemide (LASIX) 40 MG tablet   12. Class 1 obesity due to excess calories without serious comorbidity with body mass index (BMI) of 32.0 to 32.9 in adult            Plan:       1.  Dyspnea: Left atrial volume increased on echocardiogram.  Her shortness of breath has resolved with furosemide 40 mg daily.  I like to get a baseline proBNP and repeat a BMP.  Continue furosemide.    2/3/4.  Palpitations: Resolved with the increase of diltiazem to 360 mg daily.  On today's EKG tracing she had 4 PVCs noted that are multifocal.  Her recent Holter monitor showed a 50% PVC burden and has occasional PACs.  I will further discuss with Dr. Benítez  Jefe.    5.  Grade 1 Diastolic Dysfunction: Noted on echocardiogram and dyspnea has improved with furosemide.    6.  Pulmonary Hypertension: Moderate on echocardiogram.  I have recommended a repeat sleep apnea consultation.    7.  Tricuspid Regurgitation: Mild per echo in 2017 and moderate per echocardiogram recently.    8.  Obstructive Sleep Apnea: She has been diagnosed in the past, but is noncompliant with the CPAP machine because of claustrophobia.  She is willing to further discuss at her sleep apnea consultation.    9.  Carotid Artery Atherosclerosis: Stenosis on the right and plaque on the left.  She may benefit from statin therapy with her elevated lipid panel in January 2021.  I will defer to her PCP.    10.  Hyperlipidemia: She says she is due for repeat lipid panel in the near future and may benefit from statin therapy.    11.  Hypertension: Blood pressure goal of 120/80 recommended.  Blood pressure borderline elevated today.  We will continue to monitor.    12.  Obesity: BMI is 32.2.    13.  I will further discuss her plan of care with Dr. Jeyson Mayberry.  I recommend a 6-month follow-up visit with Dr. Mayberry, unless otherwise needed sooner.    ADDENDUM 7/20/2021:  · I reviewed her blood work. proBNP normal.  BMP normal except for glucose of 100.  Potassium 4.3 and I am not going to start her on potassium supplementation.  Patient informed and verbalizes understanding.  · Due to her PVC burden, I have ordered a walking myocardial perfusion study.  Dr. Mayberry also recommended referral to our EP team. Appointment will be scheduled.    As always, it has been a pleasure to participate in your patient's care. Thank you.       Sincerely,       JUSTIN Graham  [facility] Cardiology      · COVID-19 Precautions - Patient was compliant in wearing a mask. When I saw the patient, I used appropriate personal protective equipment (PPE) including mask and eye shield (standard procedure).   Additionally, I used gown and gloves if indicated.  Hand hygiene was completed before and after seeing the patient.  · Dictated utilizing Dragon Dictation  · I spent 40 minutes reviewing her medical records/testing/previous office notes/labs, face-to-face interaction with patient, physical examination, formulating the plan of care, and discussion of plan of care with patient.

## 2021-07-20 ENCOUNTER — TELEPHONE (OUTPATIENT)
Dept: CARDIOLOGY | Facility: CLINIC | Age: 76
End: 2021-07-20

## 2021-07-20 DIAGNOSIS — I49.3 PREMATURE VENTRICULAR CONTRACTIONS: Primary | ICD-10-CM

## 2021-07-20 DIAGNOSIS — R94.31 ABNORMAL ELECTROCARDIOGRAM (ECG) (EKG): ICD-10-CM

## 2021-07-20 NOTE — TELEPHONE ENCOUNTER
Patient has an increased PVC burden.  I discussed the plan of care with Dr. Mayberry and she wants the patient to be referred to our EP team.      I reviewed her blood work.  proBNP normal.  BMP normal except for glucose of 100.  Potassium 4.3.  She will continue with her current dose of furosemide 40 mg daily and I am not going to start her on potassium supplementation.     I have also recommended a walking Myoview stress test to be completed to rule out the possibility of ischemia and see if her PVCs improve with exercise.    Patient informed and verbalizes understanding.

## 2021-07-26 ENCOUNTER — HOSPITAL ENCOUNTER (OUTPATIENT)
Dept: CARDIOLOGY | Facility: HOSPITAL | Age: 76
Discharge: HOME | End: 2021-07-26

## 2021-07-26 DIAGNOSIS — I49.3 PREMATURE VENTRICULAR CONTRACTIONS: ICD-10-CM

## 2021-07-26 DIAGNOSIS — R94.31 ABNORMAL ELECTROCARDIOGRAM (ECG) (EKG): ICD-10-CM

## 2021-07-27 ENCOUNTER — HOSPITAL ENCOUNTER (OUTPATIENT)
Dept: CARDIOLOGY | Facility: HOSPITAL | Age: 76
Discharge: HOME | End: 2021-07-27
Admitting: NURSE PRACTITIONER

## 2021-07-27 VITALS — BODY MASS INDEX: 31.99 KG/M2 | WEIGHT: 187.39 LBS | HEIGHT: 64 IN

## 2021-07-27 LAB
BH CV NUCLEAR PRIOR STUDY: 3
BH CV REST NUCLEAR ISOTOPE DOSE: 11.4 MCI
BH CV STRESS BP STAGE 1: NORMAL
BH CV STRESS DURATION MIN STAGE 1: 3
BH CV STRESS DURATION MIN STAGE 2: 1
BH CV STRESS DURATION SEC STAGE 1: 0
BH CV STRESS DURATION SEC STAGE 2: 0
BH CV STRESS GRADE STAGE 1: 10
BH CV STRESS GRADE STAGE 2: 12
BH CV STRESS HR STAGE 1: 104
BH CV STRESS HR STAGE 2: 114
BH CV STRESS METS STAGE 1: 5
BH CV STRESS METS STAGE 2: 7.5
BH CV STRESS NUCLEAR ISOTOPE DOSE: 34.4 MCI
BH CV STRESS PROTOCOL 1: NORMAL
BH CV STRESS PROTOCOL 2 BP STAGE 1: NORMAL
BH CV STRESS PROTOCOL 2 COMMENTS STAGE 1: NORMAL
BH CV STRESS PROTOCOL 2 DOSE REGADENOSON STAGE 1: 0.4
BH CV STRESS PROTOCOL 2 DURATION MIN STAGE 1: 0
BH CV STRESS PROTOCOL 2 DURATION SEC STAGE 1: 10
BH CV STRESS PROTOCOL 2 HR STAGE 1: 108
BH CV STRESS PROTOCOL 2 STAGE 1: 1
BH CV STRESS PROTOCOL 2: NORMAL
BH CV STRESS RECOVERY BP: NORMAL MMHG
BH CV STRESS RECOVERY HR: 72 BPM
BH CV STRESS SPEED STAGE 1: 1.7
BH CV STRESS SPEED STAGE 2: 2.5
BH CV STRESS STAGE 1: 1
BH CV STRESS STAGE 2: 2
LV EF NUC BP: 66 %
MAXIMAL PREDICTED HEART RATE: 145 BPM
PERCENT MAX PREDICTED HR: 74.48 %
STRESS BASELINE BP: NORMAL MMHG
STRESS BASELINE HR: 63 BPM
STRESS PERCENT HR: 88 %
STRESS POST EXERCISE DUR SEC: 10 SEC
STRESS POST PEAK BP: NORMAL MMHG
STRESS POST PEAK HR: 108 BPM
STRESS TARGET HR: 123 BPM

## 2021-07-27 PROCEDURE — 78452 HT MUSCLE IMAGE SPECT MULT: CPT

## 2021-07-27 PROCEDURE — 93017 CV STRESS TEST TRACING ONLY: CPT

## 2021-07-27 PROCEDURE — 0 TECHNETIUM TETROFOSMIN KIT: Performed by: NURSE PRACTITIONER

## 2021-07-27 PROCEDURE — 93018 CV STRESS TEST I&R ONLY: CPT | Performed by: INTERNAL MEDICINE

## 2021-07-27 PROCEDURE — 25010000002 REGADENOSON 0.4 MG/5ML SOLUTION: Performed by: NURSE PRACTITIONER

## 2021-07-27 PROCEDURE — A9502 TC99M TETROFOSMIN: HCPCS | Performed by: NURSE PRACTITIONER

## 2021-07-27 PROCEDURE — 93016 CV STRESS TEST SUPVJ ONLY: CPT | Performed by: INTERNAL MEDICINE

## 2021-07-27 PROCEDURE — 78452 HT MUSCLE IMAGE SPECT MULT: CPT | Performed by: INTERNAL MEDICINE

## 2021-07-27 RX ADMIN — REGADENOSON 0.4 MG: 0.08 INJECTION, SOLUTION INTRAVENOUS at 09:18

## 2021-07-27 RX ADMIN — TETROFOSMIN 1 DOSE: 1.38 INJECTION, POWDER, LYOPHILIZED, FOR SOLUTION INTRAVENOUS at 09:18

## 2021-07-27 RX ADMIN — TETROFOSMIN 1 DOSE: 1.38 INJECTION, POWDER, LYOPHILIZED, FOR SOLUTION INTRAVENOUS at 08:30

## 2021-08-05 ENCOUNTER — APPOINTMENT (OUTPATIENT)
Dept: SLEEP MEDICINE | Facility: HOSPITAL | Age: 76
End: 2021-08-05

## 2021-08-13 ENCOUNTER — TELEPHONE (OUTPATIENT)
Dept: FAMILY MEDICINE CLINIC | Facility: CLINIC | Age: 76
End: 2021-08-13

## 2021-08-13 ENCOUNTER — OFFICE VISIT (OUTPATIENT)
Dept: FAMILY MEDICINE CLINIC | Facility: CLINIC | Age: 76
End: 2021-08-13

## 2021-08-13 VITALS
SYSTOLIC BLOOD PRESSURE: 130 MMHG | TEMPERATURE: 97.1 F | WEIGHT: 186 LBS | HEART RATE: 62 BPM | DIASTOLIC BLOOD PRESSURE: 86 MMHG | OXYGEN SATURATION: 96 % | BODY MASS INDEX: 31.76 KG/M2 | HEIGHT: 64 IN

## 2021-08-13 DIAGNOSIS — I49.1 ATRIAL ECTOPY: ICD-10-CM

## 2021-08-13 DIAGNOSIS — I65.23 ATHEROSCLEROSIS OF BOTH CAROTID ARTERIES: ICD-10-CM

## 2021-08-13 DIAGNOSIS — I36.1 NONRHEUMATIC TRICUSPID VALVE REGURGITATION: ICD-10-CM

## 2021-08-13 DIAGNOSIS — I49.3 PREMATURE VENTRICULAR CONTRACTIONS: ICD-10-CM

## 2021-08-13 DIAGNOSIS — I10 ESSENTIAL HYPERTENSION: ICD-10-CM

## 2021-08-13 DIAGNOSIS — I51.89 GRADE I DIASTOLIC DYSFUNCTION: ICD-10-CM

## 2021-08-13 DIAGNOSIS — E03.8 OTHER SPECIFIED HYPOTHYROIDISM: ICD-10-CM

## 2021-08-13 DIAGNOSIS — I48.91 ATRIAL FIBRILLATION, UNSPECIFIED TYPE (HCC): ICD-10-CM

## 2021-08-13 DIAGNOSIS — M16.12 ARTHRITIS OF LEFT HIP: ICD-10-CM

## 2021-08-13 DIAGNOSIS — E78.2 MIXED HYPERLIPIDEMIA: ICD-10-CM

## 2021-08-13 DIAGNOSIS — M16.10 PRIMARY OSTEOARTHRITIS OF HIP, UNSPECIFIED LATERALITY: ICD-10-CM

## 2021-08-13 DIAGNOSIS — J30.2 SEASONAL ALLERGIC RHINITIS, UNSPECIFIED TRIGGER: ICD-10-CM

## 2021-08-13 DIAGNOSIS — R79.89 ABNORMAL CBC: ICD-10-CM

## 2021-08-13 DIAGNOSIS — K59.09 CHRONIC CONSTIPATION: ICD-10-CM
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
## 2021-08-13 DIAGNOSIS — Z00.00 MEDICARE ANNUAL WELLNESS VISIT, SUBSEQUENT: Primary | ICD-10-CM

## 2021-08-13 PROBLEM — R29.6 FALLS: Status: ACTIVE | Noted: 2021-08-13

## 2021-08-13 PROCEDURE — 99214 OFFICE O/P EST MOD 30 MIN: CPT | Performed by: FAMILY MEDICINE

## 2021-08-13 PROCEDURE — G0439 PPPS, SUBSEQ VISIT: HCPCS | Performed by: FAMILY MEDICINE

## 2021-08-13 RX ORDER — MONTELUKAST SODIUM 10 MG/1
10 TABLET ORAL NIGHTLY
Qty: 90 TABLET | Refills: 3 | Status: SHIPPED | OUTPATIENT
Start: 2021-08-13 | End: 2022-02-18 | Stop reason: SDUPTHER

## 2021-08-13 RX ORDER — FLUTICASONE PROPIONATE 50 MCG
2 SPRAY, SUSPENSION (ML) NASAL DAILY
Qty: 48 G | Refills: 3 | Status: SHIPPED | OUTPATIENT
Start: 2021-08-13 | End: 2022-02-18 | Stop reason: SDUPTHER

## 2021-08-13 RX ORDER — LEVOTHYROXINE SODIUM 75 MCG
75 TABLET ORAL DAILY
Qty: 90 TABLET | Refills: 3 | Status: SHIPPED | OUTPATIENT
Start: 2021-08-13 | End: 2022-02-18 | Stop reason: SDUPTHER

## 2021-08-13 RX ORDER — DICLOFENAC SODIUM 75 MG/1
75 TABLET, DELAYED RELEASE ORAL 2 TIMES DAILY
Qty: 180 TABLET | Refills: 2 | Status: SHIPPED | OUTPATIENT
Start: 2021-08-13 | End: 2022-02-18 | Stop reason: SDUPTHER

## 2021-08-13 RX ORDER — LEVOTHYROXINE SODIUM 75 MCG
75 TABLET ORAL DAILY
Qty: 90 TABLET | Refills: 3 | Status: SHIPPED | OUTPATIENT
Start: 2021-08-13 | End: 2021-08-13

## 2021-08-13 RX ORDER — FUROSEMIDE 40 MG/1
40 TABLET ORAL DAILY
Qty: 90 TABLET | Refills: 3 | Status: SHIPPED | OUTPATIENT
Start: 2021-08-13 | End: 2022-02-18 | Stop reason: SDUPTHER

## 2021-08-13 RX ORDER — DILTIAZEM HYDROCHLORIDE 360 MG/1
360 CAPSULE, EXTENDED RELEASE ORAL DAILY
Qty: 90 CAPSULE | Refills: 3 | Status: SHIPPED | OUTPATIENT
Start: 2021-08-13 | End: 2022-06-02

## 2021-08-13 NOTE — PROGRESS NOTES
QUICK REFERENCE INFORMATION:  The ABCs of the Annual Wellness Visit    Subsequent Medicare Wellness Visit    HEALTH RISK ASSESSMENT    1945    Recent Hospitalizations:  No hospitalization(s) within the last year..    Hypothyroidism- doing well, on meds,  had recent labs.      htn- doing well on meds     Hyperlipidemia-  Is not on medication.  Has declined this.Working on diet.      Allergies- stable on meds, better on singulair.      afib/atherosclerosis- stable and following with cardiology. Skipping some beats and going to an electrophysiologist.      OA- stable on meds, hip doing well with replacement, follows with ortho     Having some constipation mild but chronic. Doing much better on colace.          Had an abnormal CBC and due a recheck.    Pt has fallen this year as she tripped over a hose months ago. Does not have frequent falls. Feels her balance is not as good as it used to be.     Current Medical Providers:  Patient Care Team:  Laura Muñoz MD as PCP - General (Family Medicine)  Jeyson Mayberry MD as Consulting Physician (Cardiology)        Smoking Status:  Social History     Tobacco Use   Smoking Status Former Smoker   • Quit date:    • Years since quittin.6   Smokeless Tobacco Never Used       Alcohol Consumption:  Social History     Substance and Sexual Activity   Alcohol Use Yes   • Alcohol/week: 1.0 standard drinks   • Types: 1 Glasses of wine per week    Comment: 7 drinks week/// Caffeine use: 2-3 cups daily       Depression Screen:   PHQ-2/PHQ-9 Depression Screening 2021   Little interest or pleasure in doing things 0   Feeling down, depressed, or hopeless 0   Trouble falling or staying asleep, or sleeping too much -   Feeling tired or having little energy -   Poor appetite or overeating -   Feeling bad about yourself - or that you are a failure or have let yourself or your family down -   Trouble concentrating on things, such as reading the newspaper or watching  television -   Moving or speaking so slowly that other people could have noticed. Or the opposite - being so fidgety or restless that you have been moving around a lot more than usual -   Thoughts that you would be better off dead, or of hurting yourself in some way -   Total Score 0   If you checked off any problems, how difficult have these problems made it for you to do your work, take care of things at home, or get along with other people? -       Health Habits and Functional and Cognitive Screening:  Functional & Cognitive Status 8/13/2021   Do you have difficulty preparing food and eating? No   Do you have difficulty bathing yourself, getting dressed or grooming yourself? No   Do you have difficulty using the toilet? No   Do you have difficulty moving around from place to place? No   Do you have trouble with steps or getting out of a bed or a chair? No   Current Diet Well Balanced Diet   Dental Exam Up to date   Eye Exam Up to date   Exercise (times per week) 7 times per week   Current Exercises Include Walking   Current Exercise Activities Include -   Do you need help using the phone?  No   Are you deaf or do you have serious difficulty hearing?  No   Do you need help with transportation? No   Do you need help shopping? No   Do you need help preparing meals?  No   Do you need help with housework?  No   Do you need help with laundry? No   Do you need help taking your medications? No   Do you need help managing money? No   Do you ever drive or ride in a car without wearing a seat belt? No   Have you felt unusual stress, anger or loneliness in the last month? Yes   Who do you live with? Spouse   If you need help, do you have trouble finding someone available to you? No   Have you been bothered in the last four weeks by sexual problems? No   Do you have difficulty concentrating, remembering or making decisions? Yes           Does the patient have evidence of cognitive impairment? No    Aspirin use counseling:  Does not need ASA (and currently is not on it)      Recent Lab Results:  CMP:  Lab Results   Component Value Date    GLU 99 08/09/2021    BUN 13 08/09/2021    CREATININE 0.74 08/09/2021    EGFRIFNONA 77 08/09/2021    EGFRIFAFRI 93 08/09/2021    BCR 17.6 08/09/2021     08/09/2021    K 5.2 08/09/2021    CO2 26.0 08/09/2021    CALCIUM 10.3 08/09/2021    PROTENTOTREF 7.0 08/09/2021    ALBUMIN 4.30 08/09/2021    LABGLOBREF 2.7 08/09/2021    LABIL2 1.6 08/09/2021    BILITOT 0.4 08/09/2021    ALKPHOS 124 (H) 08/09/2021    AST 17 08/09/2021    ALT 15 08/09/2021     Lipid Panel:  Lab Results   Component Value Date    TRIG 147 08/09/2021    HDL 52 08/09/2021    VLDL 26 08/09/2021     HbA1c:       Visual Acuity:  No exam data present    Age-appropriate Screening Schedule:  Refer to the list below for future screening recommendations based on patient's age, sex and/or medical conditions. Orders for these recommended tests are listed in the plan section. The patient has been provided with a written plan.    Health Maintenance   Topic Date Due   • TDAP/TD VACCINES (1 - Tdap) Never done   • ZOSTER VACCINE (1 of 2) Never done   • INFLUENZA VACCINE  10/01/2021   • DXA SCAN  03/11/2022   • LIPID PANEL  08/09/2022   • MAMMOGRAM  04/12/2023        Subjective   History of Present Illness    Hillary Crenshaw is a 75 y.o. female who presents for an Subsequent Wellness Visit.    The following portions of the patient's history were reviewed and updated as appropriate: allergies, current medications, past family history, past medical history, past social history, past surgical history and problem list.    Outpatient Medications Prior to Visit   Medication Sig Dispense Refill   • docusate sodium (COLACE) 100 MG capsule Take 1 capsule by mouth 2 (Two) Times a Day As Needed for Constipation. 180 capsule 3   • diclofenac (VOLTAREN) 75 MG EC tablet Take 1 tablet by mouth 2 (Two) Times a Day. 180 tablet 2   • dilTIAZem CD (CARDIZEM CD) 360  MG 24 hr capsule Take 1 capsule by mouth Daily. 90 capsule 3   • fluticasone (FLONASE) 50 MCG/ACT nasal spray 2 sprays by Each Nare route Daily. 48 g 3   • furosemide (LASIX) 40 MG tablet Take 1 tablet by mouth Daily. 90 tablet 3   • montelukast (Singulair) 10 MG tablet Take 1 tablet by mouth Every Night. 90 tablet 3   • Synthroid 75 MCG tablet Take 1 tablet by mouth Daily. TAKE ONE TABLET DAILY 90 tablet 3     No facility-administered medications prior to visit.       Patient Active Problem List   Diagnosis   • Hypothyroidism   • Incomplete uterovaginal prolapse   • Midline cystocele   • Mixed stress and urge urinary incontinence   • Obesity   • Rectocele   • Chronic constipation   • GERD (gastroesophageal reflux disease)   • Lung nodule   • Osteoarthritis   • Osteopenia   • Pessary maintenance   • Post-menopause   • Prolapse of female bladder, acquired   • Endometrial mass   • Mixed hyperlipidemia   • Medicare annual wellness visit, subsequent   • Thyroid cyst   • Atherosclerosis of both carotid arteries   • Premature ventricular contractions   • Atrial ectopy   • Essential hypertension   • Nonrheumatic tricuspid valve regurgitation   • Pulmonary hypertension (CMS/HCC)   • Grade I diastolic dysfunction   • Abnormal CBC   • Falls       Advance Care Planning:  ACP discussion was held with the patient during this visit. Patient has an advance directive (not in EMR), copy requested.    Identification of Risk Factors:  Risk factors include: Advance Directive Discussion.    Review of Systems   Constitutional: Negative for fever.   Respiratory: Negative for shortness of breath.        Compared to one year ago, the patient feels her physical health is the same.  Compared to one year ago, the patient feels her mental health is the same.    Objective     Physical Exam  Constitutional:       Appearance: Normal appearance. She is well-developed.   Cardiovascular:      Rate and Rhythm: Normal rate and regular rhythm.       "Heart sounds: Normal heart sounds.   Pulmonary:      Effort: Pulmonary effort is normal.      Breath sounds: Normal breath sounds.   Musculoskeletal:         General: No swelling. Normal range of motion.   Skin:     General: Skin is warm and dry.      Findings: No rash.   Neurological:      General: No focal deficit present.      Mental Status: She is alert and oriented to person, place, and time.   Psychiatric:         Mood and Affect: Mood normal.         Behavior: Behavior normal.         Vitals:    08/13/21 1328   BP: 130/86   BP Location: Left arm   Patient Position: Sitting   Pulse: 62   Temp: 97.1 °F (36.2 °C)   SpO2: 96%   Weight: 84.4 kg (186 lb)   Height: 162 cm (63.78\")       Patient's Body mass index is 32.15 kg/m². indicating that she is obese (BMI >30). Obesity-related health conditions include the following: hypertension, dyslipidemias and GERD. Obesity is unchanged. BMI is is above average; BMI management plan is completed. We discussed portion control and increasing exercise..      Assessment/Plan   Patient Self-Management and Personalized Health Advice  The patient has been provided with information about: diet, exercise and weight management and preventive services including:   · Annual Wellness Visit (AWV).    Visit Diagnoses:    ICD-10-CM ICD-9-CM   1. Medicare annual wellness visit, subsequent  Z00.00 V70.0   2. Arthritis of left hip  M16.12 716.95   3. Atrial fibrillation, unspecified type (CMS/[facility])  I48.91 427.31   4. Seasonal allergic rhinitis, unspecified trigger  J30.2 477.9   5. Essential hypertension  I10 401.9   6. Other specified hypothyroidism  E03.8 244.8   7. Atherosclerosis of both carotid arteries  I65.23 433.10     433.30   8. Atrial ectopy  I49.1 427.9   9. Grade I diastolic dysfunction  I51.9 429.9   10. Mixed hyperlipidemia  E78.2 272.2   11. Nonrheumatic tricuspid valve regurgitation  I36.1 424.2   12. Premature ventricular contractions  I49.3 427.69   13. Chronic " constipation  K59.09 564.00   14. Primary osteoarthritis of hip, unspecified laterality  M16.10 715.15   15. Abnormal CBC  R79.89 790.6   16. Fall, initial encounter  W19.XXXA E888.9       Orders Placed This Encounter   Procedures   • Ambulatory Referral to Physical Therapy Evaluate and treat     Referral Priority:   Routine     Referral Type:   Physical Therapy     Referral Reason:   Specialty Services Required     Requested Specialty:   Physical Therapy     Number of Visits Requested:   1   • CBC & Differential     Order Specific Question:   Manual Differential     Answer:   No       Outpatient Encounter Medications as of 8/13/2021   Medication Sig Dispense Refill   • docusate sodium (COLACE) 100 MG capsule Take 1 capsule by mouth 2 (Two) Times a Day As Needed for Constipation. 180 capsule 3   • [DISCONTINUED] diclofenac (VOLTAREN) 75 MG EC tablet Take 1 tablet by mouth 2 (Two) Times a Day. 180 tablet 2   • [DISCONTINUED] dilTIAZem CD (CARDIZEM CD) 360 MG 24 hr capsule Take 1 capsule by mouth Daily. 90 capsule 3   • [DISCONTINUED] fluticasone (FLONASE) 50 MCG/ACT nasal spray 2 sprays by Each Nare route Daily. 48 g 3   • [DISCONTINUED] furosemide (LASIX) 40 MG tablet Take 1 tablet by mouth Daily. 90 tablet 3   • [DISCONTINUED] montelukast (Singulair) 10 MG tablet Take 1 tablet by mouth Every Night. 90 tablet 3   • [DISCONTINUED] Synthroid 75 MCG tablet Take 1 tablet by mouth Daily. TAKE ONE TABLET DAILY 90 tablet 3   • diclofenac (VOLTAREN) 75 MG EC tablet Take 1 tablet by mouth 2 (Two) Times a Day. 180 tablet 2   • dilTIAZem CD (CARDIZEM CD) 360 MG 24 hr capsule Take 1 capsule by mouth Daily. 90 capsule 3   • fluticasone (FLONASE) 50 MCG/ACT nasal spray 2 sprays by Each Nare route Daily. 48 g 3   • furosemide (LASIX) 40 MG tablet Take 1 tablet by mouth Daily. 90 tablet 3   • montelukast (Singulair) 10 MG tablet Take 1 tablet by mouth Every Night. 90 tablet 3   • Synthroid 75 MCG tablet Take 1 tablet by mouth  Daily. TAKE ONE TABLET DAILY 90 tablet 3   • [DISCONTINUED] Synthroid 75 MCG tablet Take 1 tablet by mouth Daily. TAKE ONE TABLET DAILY 90 tablet 3     No facility-administered encounter medications on file as of 8/13/2021.       Reviewed use of high risk medication in the elderly: yes  Reviewed for potential of harmful drug interactions in the elderly: yes    Follow Up:  Return in about 6 months (around 2/13/2022) for Recheck.     An After Visit Summary and PPPS with all of these plans were given to the patient.       If cbc still abnormal will refer to hematology. Discussed risk factors. Cont meds, f/u in 6 months.

## 2021-08-14 LAB
BASOPHILS # BLD AUTO: 0.07 10*3/MM3 (ref 0–0.2)
BASOPHILS NFR BLD AUTO: 0.8 % (ref 0–1.5)
EOSINOPHIL # BLD AUTO: 0.27 10*3/MM3 (ref 0–0.4)
EOSINOPHIL NFR BLD AUTO: 3 % (ref 0.3–6.2)
ERYTHROCYTE [DISTWIDTH] IN BLOOD BY AUTOMATED COUNT: 13.1 % (ref 12.3–15.4)
HCT VFR BLD AUTO: 47.2 % (ref 34–46.6)
HGB BLD-MCNC: 15.5 G/DL (ref 12–15.9)
IMM GRANULOCYTES # BLD AUTO: 0.02 10*3/MM3 (ref 0–0.05)
IMM GRANULOCYTES NFR BLD AUTO: 0.2 % (ref 0–0.5)
LYMPHOCYTES # BLD AUTO: 2.15 10*3/MM3 (ref 0.7–3.1)
LYMPHOCYTES NFR BLD AUTO: 23.7 % (ref 19.6–45.3)
MCH RBC QN AUTO: 28.8 PG (ref 26.6–33)
MCHC RBC AUTO-ENTMCNC: 32.8 G/DL (ref 31.5–35.7)
MCV RBC AUTO: 87.7 FL (ref 79–97)
MONOCYTES # BLD AUTO: 1.01 10*3/MM3 (ref 0.1–0.9)
MONOCYTES NFR BLD AUTO: 11.1 % (ref 5–12)
NEUTROPHILS # BLD AUTO: 5.57 10*3/MM3 (ref 1.7–7)
NEUTROPHILS NFR BLD AUTO: 61.2 % (ref 42.7–76)
NRBC BLD AUTO-RTO: 0 /100 WBC (ref 0–0.2)
PLATELET # BLD AUTO: 231 10*3/MM3 (ref 140–450)
RBC # BLD AUTO: 5.38 10*6/MM3 (ref 3.77–5.28)
WBC # BLD AUTO: 9.09 10*3/MM3 (ref 3.4–10.8)

## 2021-08-17 ENCOUNTER — TRANSCRIBE ORDERS (OUTPATIENT)
Dept: CARDIOLOGY | Facility: CLINIC | Age: 76
End: 2021-08-17

## 2021-08-17 ENCOUNTER — OFFICE VISIT (OUTPATIENT)
Dept: CARDIOLOGY | Facility: CLINIC | Age: 76
End: 2021-08-17

## 2021-08-17 VITALS
HEART RATE: 72 BPM | SYSTOLIC BLOOD PRESSURE: 128 MMHG | HEIGHT: 64 IN | WEIGHT: 189.8 LBS | DIASTOLIC BLOOD PRESSURE: 84 MMHG | BODY MASS INDEX: 32.4 KG/M2

## 2021-08-17 DIAGNOSIS — I49.3 PVC (PREMATURE VENTRICULAR CONTRACTION): Primary | ICD-10-CM

## 2021-08-17 DIAGNOSIS — Z01.810 PREPROCEDURAL CARDIOVASCULAR EXAMINATION: ICD-10-CM

## 2021-08-17 DIAGNOSIS — Z13.6 SCREENING FOR CARDIOVASCULAR CONDITION: Primary | ICD-10-CM

## 2021-08-17 PROCEDURE — 93000 ELECTROCARDIOGRAM COMPLETE: CPT | Performed by: INTERNAL MEDICINE

## 2021-08-17 PROCEDURE — 99214 OFFICE O/P EST MOD 30 MIN: CPT | Performed by: INTERNAL MEDICINE

## 2021-08-17 NOTE — PROGRESS NOTES
Date of Office Visit: 2021  Encounter Provider: Jasbir De La Torre MD  Place of Service: [facility] CARDIOLOGY  Patient Name: Hillary Crenshaw  :1945    Chief Complaint   Patient presents with   • PVCs     New Patient Consult - Dr. Mayberry patient   • Palpitations   • Sleep Apnea   :     HPI: Hillary Crenshaw is a 76 y.o. female who presents today for palpitations.      The patient reports a 30 year of palpitations, but they have worsened over the past few years.    She has also several episodes of syncope, last one was 5-6 years ago.      She describes frequent palpitations, sometimes occurring in bursts.  She has symptoms of shortness of breath, and fatigue.  Symptoms are worse when she was in [place].          Past Medical History:   Diagnosis Date   • Abnormal stool test    • Acute allergic rhinitis    • Acute sinusitis    • Allergic rhinitis    • Ankle pain    • Arthritis of left hip    • Atherosclerosis of both carotid arteries     2019: <15% bilaterally   • Atrial ectopy    • Boil, thigh    • Chronic constipation    • Gastritis    • GERD (gastroesophageal reflux disease)    • Grade I diastolic dysfunction 2021   • Head ache    • Hyperlipidemia    • Hypertension    • Hypervitaminosis D    • Hypothyroidism    • Lung nodule    • Osteoarthritis     left hip   • Osteopenia    • Pessary maintenance    • Post-menopause    • Premature ventricular contractions    • Prolapse of female bladder, acquired    • PSVT (paroxysmal supraventricular tachycardia) (CMS/HCC)    • Pulmonary hypertension (CMS/HCC)     Severe per echo 2021   • Rectal bleeding    • Right foot pain    • Urine frequency    • UTI (urinary tract infection)        Past Surgical History:   Procedure Laterality Date   • FOOT SURGERY     • TOTAL HIP ARTHROPLASTY         Social History     Socioeconomic History   • Marital status:      Spouse name: Not on file   • Number of children: Not on file   • Years  of education: Not on file   • Highest education level: Not on file   Tobacco Use   • Smoking status: Former Smoker     Quit date:      Years since quittin.6   • Smokeless tobacco: Never Used   Vaping Use   • Vaping Use: Never used   Substance and Sexual Activity   • Alcohol use: Yes     Alcohol/week: 1.0 standard drinks     Types: 1 Glasses of wine per week     Comment: 7 drinks week/// Caffeine use: 2-3 cups daily   • Drug use: No   • Sexual activity: Defer       Family History   Problem Relation Age of Onset   • Breast cancer Mother    • Heart attack Father    • Alcohol abuse Sister    • Arthritis Sister    • Diabetes Sister    • Hyperlipidemia Sister    • Osteoporosis Sister    • Thyroid disease Sister    • Alcohol abuse Brother    • Hypertension Brother    • Hyperlipidemia Brother    • Lung cancer Brother    • Heart attack Brother    • Bipolar disorder Maternal Grandmother    • Breast cancer Maternal Grandmother    • Depression Maternal Grandmother    • Other Other         cardiac disorder, neoplasm of breast   • No Known Problems Maternal Grandfather    • No Known Problems Paternal Grandmother    • No Known Problems Paternal Grandfather        Review of Systems   Constitutional: Positive for malaise/fatigue.   HENT: Negative.    Eyes: Negative.    Cardiovascular: Positive for dyspnea on exertion. Negative for chest pain, leg swelling and near-syncope.   Respiratory: Positive for shortness of breath. Negative for cough.    Endocrine: Negative.    Hematologic/Lymphatic: Negative.    Skin: Negative.    Musculoskeletal: Negative.    Gastrointestinal: Negative.    Genitourinary: Negative.    Neurological: Negative.  Negative for weakness.   Psychiatric/Behavioral: Negative.    Allergic/Immunologic: Negative.        No Known Allergies      Current Outpatient Medications:   •  diclofenac (VOLTAREN) 75 MG EC tablet, Take 1 tablet by mouth 2 (Two) Times a Day. (Patient taking differently: Take 75 mg by mouth 2  "(Two) Times a Day. ONLY TAKING DAILY), Disp: 180 tablet, Rfl: 2  •  dilTIAZem CD (CARDIZEM CD) 360 MG 24 hr capsule, Take 1 capsule by mouth Daily., Disp: 90 capsule, Rfl: 3  •  docusate sodium (COLACE) 100 MG capsule, Take 1 capsule by mouth 2 (Two) Times a Day As Needed for Constipation., Disp: 180 capsule, Rfl: 3  •  fluticasone (FLONASE) 50 MCG/ACT nasal spray, 2 sprays by Each Nare route Daily., Disp: 48 g, Rfl: 3  •  furosemide (LASIX) 40 MG tablet, Take 1 tablet by mouth Daily., Disp: 90 tablet, Rfl: 3  •  montelukast (Singulair) 10 MG tablet, Take 1 tablet by mouth Every Night., Disp: 90 tablet, Rfl: 3  •  Synthroid 75 MCG tablet, Take 1 tablet by mouth Daily. TAKE ONE TABLET DAILY, Disp: 90 tablet, Rfl: 3      Objective:     Vitals:    08/17/21 0910   BP: 128/84   Pulse: 72   Weight: 86.1 kg (189 lb 12.8 oz)   Height: 162.6 cm (64\")     Body mass index is 32.58 kg/m².    PHYSICAL EXAM:    Vitals and nursing note reviewed.   Constitutional:       Appearance: Normal and healthy appearance.   HENT:    Mouth/Throat:      Pharynx: Oropharynx is clear.   Cardiovascular:      Normal rate. Frequent ectopic beats. Irregular rhythm.   Edema:     Peripheral edema absent.   Skin:     General: Skin is warm.   Neurological:      Mental Status: Alert and oriented to person, place and time.   Psychiatric:         Attention and Perception: Attention and perception normal.         Mood and Affect: Mood and affect normal.             ECG 12 Lead    Date/Time: 8/17/2021 9:53 AM  Performed by: Jasbir De La Torre MD  Authorized by: Jasbir De La Torre MD   Comparison: compared with previous ECG from 7/19/2021  Similar to previous ECG  Rhythm: sinus rhythm  Ectopy: multifocal PVCs and unifocal PVCs        I reviewed her Holter monitor tracings.  This showed nearly 50% burden of premature ventricular contractions.    Premature ventricular contractions have a left bundle, inferior axis    I reviewed her echocardiogram images.  " Her ejection fraction is preserved      Assessment:       Diagnosis Plan   1. PVC (premature ventricular contraction)  ECG 12 Lead    Case Request EP Lab: Ablation PVC          Plan:       We discussed options for treatment of her symptomatic premature ventricular contractions.  I recommended as first-line treatment and attempted ablation, with possible alternative treatment of antiarrhythmic therapy.  She feels the Cardizem has worsened her fatigue, and is reluctant to take an additional medication.  I feel that ablation offers a better chance of cure of her premature ventricular contractions.  We discussed the risk of stroke, cardiac tamponade, and death.  Patient expressed a desire to proceed with ablation.  She would like to delay until she can complete a planned vacation.    As always, it has been a pleasure to participate in your patient's care.      Sincerely,         Jasbir De La Torre MD

## 2021-08-23 ENCOUNTER — TELEPHONE (OUTPATIENT)
Dept: CARDIOLOGY | Facility: CLINIC | Age: 76
End: 2021-08-23

## 2021-08-24 NOTE — TELEPHONE ENCOUNTER
----- Message from JUSTIN Law sent at 6/16/2021  3:07 PM EDT -----    Follow-up on [facility] sleep medicine referral

## 2021-08-25 DIAGNOSIS — K59.09 CHRONIC CONSTIPATION: ICD-10-CM

## 2021-08-25 RX ORDER — DOCUSATE SODIUM 100 MG
CAPSULE ORAL
Qty: 180 CAPSULE | Refills: 3 | Status: SHIPPED | OUTPATIENT
Start: 2021-08-25 | End: 2022-06-02

## 2021-09-15 ENCOUNTER — TELEPHONE (OUTPATIENT)
Dept: CARDIOLOGY | Facility: CLINIC | Age: 76
End: 2021-09-15

## 2021-09-15 NOTE — TELEPHONE ENCOUNTER
Patient called to cancel ablation for 10/8/21 with . She wants to reschedule for January. She can be reached at [phone]  trm

## 2021-09-20 NOTE — TELEPHONE ENCOUNTER
I called and spoke to her.  She wants to postpone her PVC ablation.  We discussed risk of developing PVC induced cardiomyopathy.  She will monitor for any symptoms of heart failure.  I will have her follow-up in 6 months.

## 2021-09-22 ENCOUNTER — TRANSCRIBE ORDERS (OUTPATIENT)
Dept: ADMINISTRATIVE | Facility: HOSPITAL | Age: 76
End: 2021-09-22

## 2021-09-22 ENCOUNTER — TELEPHONE (OUTPATIENT)
Dept: FAMILY MEDICINE CLINIC | Facility: CLINIC | Age: 76
End: 2021-09-22

## 2021-09-22 NOTE — TELEPHONE ENCOUNTER
Caller: Hillary Crenshaw    Relationship: Self    Best call back number: [phone] (H)    What orders are you requesting (i.e. lab or imaging): ULTRASOUND OF BREAST    In what timeframe would the patient need to come in:     Where will you receive your lab/imaging services: [facility] [phone]    Additional notes:

## 2021-09-22 NOTE — TELEPHONE ENCOUNTER
She is not due to get that done until April of next year.  Please ask her and make sure there is nothing else new going on.

## 2021-09-22 NOTE — TELEPHONE ENCOUNTER
Pt stated she received a  letter from the mammogram department stating she needed to follow up in 6 months for another US    After your last breast imaging exam, it was recommended that you return for a 6-month follow-up breast ultrasound exam. Our records indicate that you are due for this exam on 10/9/2021.

## 2021-09-22 NOTE — TELEPHONE ENCOUNTER
Please contact the radiology department as the last mammogram I can see was from April and they recommended a one year follow up. Unless she went somewhere else and got another mammogram?

## 2021-09-23 NOTE — TELEPHONE ENCOUNTER
Spoke to the mammogram department, that letter went out by mistake she is not due for a year not 6 months.

## 2022-02-08 NOTE — PROGRESS NOTES
RM:________     PCP: Laura Muñoz MD    : 1945  AGE: 76 y.o.  EST PATIENT   REASON FOR VISIT/  CC:    BP Readings from Last 3 Encounters:   21 136/84   21 128/84   21 130/86        WT: ____________ BP: __________L __________R HR______    CHEST PAIN: _____________    SOA: _____________PALPS: _______________     LIGHTHEADED: ___________FATIGUE: ________________ EDEMA __________    ALLERGIES:Patient has no known allergies. SMOKING HISTORY:  Social History     Tobacco Use   • Smoking status: Former Smoker     Quit date: 1966     Years since quittin.1   • Smokeless tobacco: Never Used   Vaping Use   • Vaping Use: Never used   Substance Use Topics   • Alcohol use: Yes     Alcohol/week: 1.0 standard drink     Types: 1 Glasses of wine per week     Comment: 7 drinks week/// Caffeine use: 2-3 cups daily   • Drug use: No     CAFFEINE USE_________________  ALCOHOL ______________________    Below is the patient's most recent value for Albumin, ALT, AST, BUN, Calcium, Chloride, Cholesterol, CO2, Creatinine, GFR, Glucose, HDL, Hematocrit, Hemoglobin, Hemoglobin A1C, LDL, Magnesium, Phosphorus, Platelets, Potassium, PSA, Sodium, Triglycerides, TSH and WBC.   Lab Results   Component Value Date    ALBUMIN 4.30 2021    ALT 15 2021    AST 17 2021    BUN 13 2021    CALCIUM 10.3 2021     2021    CO2 26.0 2021    CREATININE 0.74 2021    GLU 93 2018    HDL 52 2021    HCT 47.2 (H) 2021    HGB 15.5 2021     (H) 2021    MG 2.1 2018     2021    K 5.2 2021     2021    TRIG 147 2021    TSH 1.760 2021    WBC 9.09 2021          NEW DIAGNOSIS/ SURGERY/ HOSP OR ED VISITS: ______________________    __________________________________________________________________      RECENT LABS OR DIAGNOSTIC TESTING:   _____________________________    __________________________________________________________________      ASSESSMENT/ PLAN: _______________________________________________    __________________________________________________________________

## 2022-02-14 ENCOUNTER — OFFICE VISIT (OUTPATIENT)
Dept: CARDIOLOGY | Facility: CLINIC | Age: 77
End: 2022-02-14

## 2022-02-14 VITALS
OXYGEN SATURATION: 100 % | DIASTOLIC BLOOD PRESSURE: 82 MMHG | HEIGHT: 64 IN | HEART RATE: 67 BPM | WEIGHT: 192 LBS | SYSTOLIC BLOOD PRESSURE: 138 MMHG | BODY MASS INDEX: 32.78 KG/M2 | RESPIRATION RATE: 16 BRPM

## 2022-02-14 DIAGNOSIS — I49.3 PREMATURE VENTRICULAR CONTRACTIONS: Primary | ICD-10-CM

## 2022-02-14 DIAGNOSIS — I65.23 ATHEROSCLEROSIS OF BOTH CAROTID ARTERIES: ICD-10-CM

## 2022-02-14 DIAGNOSIS — R60.9 PERIPHERAL EDEMA: ICD-10-CM

## 2022-02-14 DIAGNOSIS — E03.8 OTHER SPECIFIED HYPOTHYROIDISM: ICD-10-CM

## 2022-02-14 DIAGNOSIS — I27.20 PULMONARY HYPERTENSION: ICD-10-CM

## 2022-02-14 DIAGNOSIS — I36.1 NONRHEUMATIC TRICUSPID VALVE REGURGITATION: ICD-10-CM

## 2022-02-14 DIAGNOSIS — E78.2 MIXED HYPERLIPIDEMIA: Primary | ICD-10-CM

## 2022-02-14 DIAGNOSIS — I10 ESSENTIAL HYPERTENSION: ICD-10-CM

## 2022-02-14 PROCEDURE — 99214 OFFICE O/P EST MOD 30 MIN: CPT | Performed by: INTERNAL MEDICINE

## 2022-02-14 PROCEDURE — 93000 ELECTROCARDIOGRAM COMPLETE: CPT | Performed by: INTERNAL MEDICINE

## 2022-02-14 RX ORDER — SODIUM FLUORIDE1.1%, POTASSIUM NITRATE 5% 5.8; 57.5 MG/ML; MG/ML
GEL, DENTIFRICE DENTAL
COMMUNITY
Start: 2022-01-11 | End: 2022-06-02

## 2022-02-14 NOTE — PROGRESS NOTES
Date of Office Visit: 22  Encounter Provider: Jeyson Mayberry MD  Place of Service: [facility] CARDIOLOGY  Patient Name: Hillary Crenshaw  :1945    Chief Complaint   Patient presents with   • Heart Problem     PVC follow up   :     HPI:     Ms. Crenshaw is 76 y.o. and presents today to follow up. I have reviewed prior notes and there are no changes except for any new updates described below. I have also reviewed any information entered into the medical record by the patient or by ancillary staff.     In 2016, she had an episode of syncope. It was her second episode. Both of them were preceded by abrupt onset, extremely rapid heart beat. She had a Holter and an echo.  The Holter showed frequent monomorphic PVCs (22% burden) and occasional PACs.  There were short bursts of PACs. In her chart, she was given a diagnosis of atrial fibrillation, but it does not appear that she was ever actually diagnosed with atrial fibrillation.  It was felt that her symptoms were brought on by SVT. She was placed on diltiazem.    She presented in  for palpitations and edema. An echo showed normal LVSF, EF 55%, grade 1 diastolic dysfunction, moderate TR, and moderate PHTN (RVSP 49mm Hg). A Hoter showed a 50% burden of monomorphic PVCs.  She was placed on furosemide, and referred to EP.  He recommended a PVC ablation, but she elected to hold off as she was doing fairly well.    She continues to have pedal edema although it is improved.  She rarely has any palpitations.  She has not had any lightheadedness or syncope.  She has not had chest pain.  She reports that her current diltiazem dose is actually quite expensive.  In the past, her symptoms have worsened when she was in [place], and she is getting ready to return there, and wonders if they will get worse again.    She is known to have very mild carotid atherosclerosis, 16 to 49% on the right in .    Past Medical History:   Diagnosis  Date   • Abnormal stool test    • Acute allergic rhinitis    • Acute sinusitis    • Allergic rhinitis    • Ankle pain    • Arthritis of left hip    • Atherosclerosis of both carotid arteries     : right 16-49%, left with plaque   • Atrial ectopy    • Boil, thigh    • Chronic constipation    • Gastritis    • GERD (gastroesophageal reflux disease)    • Grade I diastolic dysfunction 2021   • Head ache    • Hyperlipidemia    • Hypertension    • Hypervitaminosis D    • Hypothyroidism    • Lung nodule    • Osteoarthritis     left hip   • Osteopenia    • Pessary maintenance    • Post-menopause    • Premature ventricular contractions    • Prolapse of female bladder, acquired    • PSVT (paroxysmal supraventricular tachycardia) (HCC)    • Pulmonary hypertension (HCC)     Severe per echo 2021   • Rectal bleeding    • Right foot pain    • Urine frequency    • UTI (urinary tract infection)        Past Surgical History:   Procedure Laterality Date   • FOOT SURGERY     • TOTAL HIP ARTHROPLASTY         Social History     Socioeconomic History   • Marital status:    Tobacco Use   • Smoking status: Former Smoker     Quit date:      Years since quittin.1   • Smokeless tobacco: Never Used   Vaping Use   • Vaping Use: Never used   Substance and Sexual Activity   • Alcohol use: Yes     Alcohol/week: 1.0 standard drink     Types: 1 Glasses of wine per week     Comment: 7 drinks week/// Caffeine use: 2-3 cups daily   • Drug use: No   • Sexual activity: Defer       Family History   Problem Relation Age of Onset   • Breast cancer Mother    • Heart attack Father    • Alcohol abuse Sister    • Arthritis Sister    • Diabetes Sister    • Hyperlipidemia Sister    • Osteoporosis Sister    • Thyroid disease Sister    • Alcohol abuse Brother    • Hypertension Brother    • Hyperlipidemia Brother    • Lung cancer Brother    • Heart attack Brother    • Bipolar disorder Maternal Grandmother    • Breast cancer Maternal  "Grandmother    • Depression Maternal Grandmother    • Other Other         cardiac disorder, neoplasm of breast   • No Known Problems Maternal Grandfather    • No Known Problems Paternal Grandmother    • No Known Problems Paternal Grandfather      Review of Systems   Constitutional: Positive for weight gain.   Cardiovascular: Positive for leg swelling.   All other systems reviewed and are negative.    No Known Allergies      Current Outpatient Medications:   •  diclofenac (VOLTAREN) 75 MG EC tablet, Take 1 tablet by mouth 2 (Two) Times a Day. (Patient taking differently: Take 75 mg by mouth 2 (Two) Times a Day. ONLY TAKING DAILY), Disp: 180 tablet, Rfl: 2  •  dilTIAZem CD (CARDIZEM CD) 360 MG 24 hr capsule, Take 1 capsule by mouth Daily., Disp: 90 capsule, Rfl: 3  •  fluticasone (FLONASE) 50 MCG/ACT nasal spray, 2 sprays by Each Nare route Daily., Disp: 48 g, Rfl: 3  •  furosemide (LASIX) 40 MG tablet, Take 1 tablet by mouth Daily., Disp: 90 tablet, Rfl: 3  •  montelukast (Singulair) 10 MG tablet, Take 1 tablet by mouth Every Night., Disp: 90 tablet, Rfl: 3  •  Stool Softener 100 MG capsule, TAKE ONE CAPSULE BY MOUTH TWICE A DAY AS NEEDED FOR CONSTIPATION, Disp: 180 capsule, Rfl: 3  •  Synthroid 75 MCG tablet, Take 1 tablet by mouth Daily. TAKE ONE TABLET DAILY, Disp: 90 tablet, Rfl: 3  •  Sodium Fluoride 5000 Sensitive 1.1-5 % gel, , Disp: , Rfl:       Objective:     Vitals:    02/14/22 1055 02/14/22 1106   BP: 142/80 138/82   BP Location: Right arm Left arm   Patient Position: Sitting Sitting   Cuff Size: Adult Adult   Pulse: 67    Resp: 16    SpO2: 100%    Weight: 87.1 kg (192 lb)    Height: 162.6 cm (64.02\")      Body mass index is 32.94 kg/m².    Vitals reviewed.   Constitutional:       Appearance: Well-developed and not in distress.   Eyes:      Conjunctiva/sclera: Conjunctivae normal.   HENT:      Head: Normocephalic.      Nose: Nose normal.         Comments: Masked  Neck:      Vascular: No JVD. JVD normal. "   Pulmonary:      Effort: Pulmonary effort is normal.      Breath sounds: Normal breath sounds.   Cardiovascular:      Normal rate. Occasional ectopic beats. Regular rhythm.      Murmurs: There is no murmur.   Pulses:     Intact distal pulses.   Edema:     Peripheral edema absent.   Abdominal:      Palpations: Abdomen is soft.      Tenderness: There is no abdominal tenderness.   Musculoskeletal: Normal range of motion.      Cervical back: Normal range of motion. Skin:     General: Skin is warm and dry.      Findings: No rash.   Neurological:      Mental Status: Alert, oriented to person, place, and time and oriented to person, place and time.      Cranial Nerves: No cranial nerve deficit.   Psychiatric:         Behavior: Behavior normal.         Thought Content: Thought content normal.         Judgment: Judgment normal.           ECG 12 Lead    Date/Time: 2/14/2022 11:32 AM  Performed by: Jeyson Mayberry MD  Authorized by: Jeyson Mayberry MD   Comparison: compared with previous ECG   Similar to previous ECG  Rhythm: sinus rhythm  Ectopy: unifocal PVCs  Conduction: conduction normal  ST Segments: ST segments normal  T Waves: T waves normal  QRS axis: normal  Other: no other findings              Assessment:       Diagnosis Plan   1. Premature ventricular contractions  ECG 12 Lead    Adult Transthoracic Echo Complete W/ Cont if Necessary Per Protocol   2. Essential hypertension     3. Nonrheumatic tricuspid valve regurgitation  Adult Transthoracic Echo Complete W/ Cont if Necessary Per Protocol   4. Pulmonary hypertension (HCC)  Adult Transthoracic Echo Complete W/ Cont if Necessary Per Protocol   5. Peripheral edema     6. Atherosclerosis of both carotid arteries        Plan:       1.  She has frequent monomorphic PVCs.  They are minimally symptomatic.  She has been offered PVC ablation, but would like to hold off unless it is absolutely necessary.  She will remain on diltiazem.  Asked her to call her prescription drug  plan and see if two 180 mg capsules would be cheaper than the 360 mg capsule.  She will need a yearly echocardiogram to rule out PVC induced cardiomyopathy, although I think this is unlikely.    2. Her BP is a bit high but she is always nervous here. She had a CMP this morning; depending on those results, I may start a thiazide or spironolactone.     3/4.  In 2021, she had moderate tricuspid regurgitation and moderate pulmonary retention.  She declines a sleep apnea evaluation.  I suspect that this is due to obesity and sleep apnea.  I think it might have improved now that she has been started on furosemide.  I will repeat an echo this summer.    5.  This is multifactorial, but diltiazem is surely playing a role.  She has a lot of fatigue, and I think that switching from a calcium channel blocker to a beta-blocker will worsen that.  As a result, I favor continuing the calcium channel blocker and the diuretic, and keeping the legs compressed and elevated.    6.  She had mild, less than 50% disease, on the right in 2021.  This can be repeated in 2023.    Sincerely,       Jeyson Mayberry MD

## 2022-02-15 LAB
ALBUMIN SERPL-MCNC: 4.2 G/DL (ref 3.7–4.7)
ALBUMIN/GLOB SERPL: 1.6 {RATIO} (ref 1.2–2.2)
ALP SERPL-CCNC: 132 IU/L (ref 44–121)
ALT SERPL-CCNC: 22 IU/L (ref 0–32)
AST SERPL-CCNC: 17 IU/L (ref 0–40)
BASOPHILS # BLD AUTO: 0.1 X10E3/UL (ref 0–0.2)
BASOPHILS NFR BLD AUTO: 1 %
BILIRUB SERPL-MCNC: 0.4 MG/DL (ref 0–1.2)
BUN SERPL-MCNC: 15 MG/DL (ref 8–27)
BUN/CREAT SERPL: 23 (ref 12–28)
CALCIUM SERPL-MCNC: 9.9 MG/DL (ref 8.7–10.3)
CHLORIDE SERPL-SCNC: 103 MMOL/L (ref 96–106)
CHOLEST SERPL-MCNC: 226 MG/DL (ref 100–199)
CO2 SERPL-SCNC: 23 MMOL/L (ref 20–29)
CREAT SERPL-MCNC: 0.65 MG/DL (ref 0.57–1)
EOSINOPHIL # BLD AUTO: 0.2 X10E3/UL (ref 0–0.4)
EOSINOPHIL NFR BLD AUTO: 3 %
ERYTHROCYTE [DISTWIDTH] IN BLOOD BY AUTOMATED COUNT: 12.4 % (ref 11.7–15.4)
GLOBULIN SER CALC-MCNC: 2.7 G/DL (ref 1.5–4.5)
GLUCOSE SERPL-MCNC: 93 MG/DL (ref 65–99)
HCT VFR BLD AUTO: 45.5 % (ref 34–46.6)
HDLC SERPL-MCNC: 61 MG/DL
HGB BLD-MCNC: 15.1 G/DL (ref 11.1–15.9)
IMM GRANULOCYTES # BLD AUTO: 0 X10E3/UL (ref 0–0.1)
IMM GRANULOCYTES NFR BLD AUTO: 0 %
LDLC SERPL CALC-MCNC: 138 MG/DL (ref 0–99)
LYMPHOCYTES # BLD AUTO: 1.9 X10E3/UL (ref 0.7–3.1)
LYMPHOCYTES NFR BLD AUTO: 24 %
MCH RBC QN AUTO: 28.7 PG (ref 26.6–33)
MCHC RBC AUTO-ENTMCNC: 33.2 G/DL (ref 31.5–35.7)
MCV RBC AUTO: 87 FL (ref 79–97)
MONOCYTES # BLD AUTO: 0.8 X10E3/UL (ref 0.1–0.9)
MONOCYTES NFR BLD AUTO: 10 %
NEUTROPHILS # BLD AUTO: 5.1 X10E3/UL (ref 1.4–7)
NEUTROPHILS NFR BLD AUTO: 62 %
PLATELET # BLD AUTO: 227 X10E3/UL (ref 150–450)
POTASSIUM SERPL-SCNC: 4.6 MMOL/L (ref 3.5–5.2)
PROT SERPL-MCNC: 6.9 G/DL (ref 6–8.5)
RBC # BLD AUTO: 5.26 X10E6/UL (ref 3.77–5.28)
SODIUM SERPL-SCNC: 140 MMOL/L (ref 134–144)
TRIGL SERPL-MCNC: 150 MG/DL (ref 0–149)
TSH SERPL DL<=0.005 MIU/L-ACNC: 1.83 UIU/ML (ref 0.45–4.5)
VLDLC SERPL CALC-MCNC: 27 MG/DL (ref 5–40)
WBC # BLD AUTO: 8.1 X10E3/UL (ref 3.4–10.8)

## 2022-02-18 ENCOUNTER — OFFICE VISIT (OUTPATIENT)
Dept: FAMILY MEDICINE CLINIC | Facility: CLINIC | Age: 77
End: 2022-02-18

## 2022-02-18 VITALS
BODY MASS INDEX: 32.98 KG/M2 | WEIGHT: 193.2 LBS | DIASTOLIC BLOOD PRESSURE: 68 MMHG | OXYGEN SATURATION: 99 % | SYSTOLIC BLOOD PRESSURE: 127 MMHG | TEMPERATURE: 97.1 F | HEIGHT: 64 IN | HEART RATE: 64 BPM

## 2022-02-18 DIAGNOSIS — I27.20 PULMONARY HYPERTENSION: ICD-10-CM

## 2022-02-18 DIAGNOSIS — M16.10 PRIMARY OSTEOARTHRITIS OF HIP, UNSPECIFIED LATERALITY: ICD-10-CM

## 2022-02-18 DIAGNOSIS — K59.09 CHRONIC CONSTIPATION: ICD-10-CM

## 2022-02-18 DIAGNOSIS — E03.8 OTHER SPECIFIED HYPOTHYROIDISM: ICD-10-CM

## 2022-02-18 DIAGNOSIS — R79.89 ABNORMAL CBC: ICD-10-CM

## 2022-02-18 DIAGNOSIS — E78.2 MIXED HYPERLIPIDEMIA: Primary | ICD-10-CM

## 2022-02-18 DIAGNOSIS — I51.89 GRADE I DIASTOLIC DYSFUNCTION: ICD-10-CM

## 2022-02-18 DIAGNOSIS — J30.2 SEASONAL ALLERGIC RHINITIS, UNSPECIFIED TRIGGER: ICD-10-CM

## 2022-02-18 DIAGNOSIS — I65.23 ATHEROSCLEROSIS OF BOTH CAROTID ARTERIES: ICD-10-CM

## 2022-02-18 DIAGNOSIS — M16.12 ARTHRITIS OF LEFT HIP: ICD-10-CM

## 2022-02-18 DIAGNOSIS — I49.3 PREMATURE VENTRICULAR CONTRACTIONS: ICD-10-CM

## 2022-02-18 DIAGNOSIS — I49.1 ATRIAL ECTOPY: ICD-10-CM

## 2022-02-18 DIAGNOSIS — I10 ESSENTIAL HYPERTENSION: ICD-10-CM

## 2022-02-18 PROCEDURE — 99214 OFFICE O/P EST MOD 30 MIN: CPT | Performed by: FAMILY MEDICINE

## 2022-02-18 RX ORDER — FLUTICASONE PROPIONATE 50 MCG
2 SPRAY, SUSPENSION (ML) NASAL DAILY
Qty: 48 G | Refills: 3 | Status: SHIPPED | OUTPATIENT
Start: 2022-02-18 | End: 2022-07-08

## 2022-02-18 RX ORDER — LEVOTHYROXINE SODIUM 75 MCG
75 TABLET ORAL DAILY
Qty: 90 TABLET | Refills: 3 | Status: SHIPPED | OUTPATIENT
Start: 2022-02-18 | End: 2023-03-15 | Stop reason: SDUPTHER

## 2022-02-18 RX ORDER — MONTELUKAST SODIUM 10 MG/1
10 TABLET ORAL NIGHTLY
Qty: 90 TABLET | Refills: 3 | Status: SHIPPED | OUTPATIENT
Start: 2022-02-18 | End: 2023-03-13

## 2022-02-18 RX ORDER — FUROSEMIDE 40 MG/1
40 TABLET ORAL DAILY
Qty: 90 TABLET | Refills: 3 | Status: SHIPPED | OUTPATIENT
Start: 2022-02-18 | End: 2023-03-13

## 2022-02-18 RX ORDER — DICLOFENAC SODIUM 75 MG/1
75 TABLET, DELAYED RELEASE ORAL 2 TIMES DAILY
Qty: 60 TABLET | Refills: 1 | Status: SHIPPED | OUTPATIENT
Start: 2022-02-18 | End: 2022-07-08

## 2022-02-18 NOTE — PROGRESS NOTES
Subjective   Hillary Crenshaw is a 76 y.o. female.     Chief Complaint   Patient presents with   • Hyperlipidemia       History of Present Illness   Hypothyroidism- doing well, on meds,  had recent labs.      htn- doing well on meds     Hyperlipidemia-  Is not on medication.  Has declined this.Working on diet. Is a little high on check 3 days ago. Reviewed labs (cbc,cmp,lipids and tsh)     Allergies- stable on meds, better on singulair.      Afib/atherosclerosis/chf- stable and following with cardiology. Skipping some beats and going to an electrophysiologist. He wants to operate and she has declined this.      OA- stable on meds, hip doing well with replacement, follows with ortho     Having some constipation mild but chronic. Doing much better on colace.      pulm htn-  Following with cardiology. Would like to see pulm as cardiology has not mentioned this.       Had an abnormal CBC and recheck normal now.     The following portions of the patient's history were reviewed and updated as appropriate: allergies, current medications, past family history, past medical history, past social history, past surgical history and problem list.    Past Medical History:   Diagnosis Date   • Abnormal stool test    • Acute allergic rhinitis    • Acute sinusitis    • Allergic rhinitis    • Ankle pain    • Arthritis of left hip    • Atherosclerosis of both carotid arteries     2021: right 16-49%, left with plaque   • Atrial ectopy    • Boil, thigh    • Chronic constipation    • Gastritis    • GERD (gastroesophageal reflux disease)    • Grade I diastolic dysfunction 6/1/2021   • Head ache    • Hyperlipidemia    • Hypertension    • Hypervitaminosis D    • Hypothyroidism    • Lung nodule    • Osteoarthritis     left hip   • Osteopenia    • Pessary maintenance    • Post-menopause    • Premature ventricular contractions    • Prolapse of female bladder, acquired    • PSVT (paroxysmal supraventricular tachycardia) ([facility])    • Pulmonary  "hypertension (HCC)     Severe per echo 2021   • Rectal bleeding    • Right foot pain    • Urine frequency    • UTI (urinary tract infection)        Past Surgical History:   Procedure Laterality Date   • FOOT SURGERY     • TOTAL HIP ARTHROPLASTY         Family History   Problem Relation Age of Onset   • Breast cancer Mother    • Heart attack Father    • Alcohol abuse Sister    • Arthritis Sister    • Diabetes Sister    • Hyperlipidemia Sister    • Osteoporosis Sister    • Thyroid disease Sister    • Alcohol abuse Brother    • Hypertension Brother    • Hyperlipidemia Brother    • Lung cancer Brother    • Heart attack Brother    • Bipolar disorder Maternal Grandmother    • Breast cancer Maternal Grandmother    • Depression Maternal Grandmother    • Other Other         cardiac disorder, neoplasm of breast   • No Known Problems Maternal Grandfather    • No Known Problems Paternal Grandmother    • No Known Problems Paternal Grandfather        Social History     Socioeconomic History   • Marital status:    Tobacco Use   • Smoking status: Former Smoker     Quit date:      Years since quittin.1   • Smokeless tobacco: Never Used   Vaping Use   • Vaping Use: Never used   Substance and Sexual Activity   • Alcohol use: Yes     Alcohol/week: 1.0 standard drink     Types: 1 Glasses of wine per week     Comment: 7 drinks week/// Caffeine use: 2-3 cups daily   • Drug use: No   • Sexual activity: Defer       Review of Systems   Constitutional: Negative for fever.   Respiratory: Negative for shortness of breath.        Objective   Visit Vitals  /68 (BP Location: Left arm, Patient Position: Sitting)   Pulse 64   Temp 97.1 °F (36.2 °C)   Ht 162.6 cm (64.02\")   Wt 87.6 kg (193 lb 3.2 oz)   SpO2 99%   BMI 33.15 kg/m²     Body mass index is 33.15 kg/m².  Physical Exam  Constitutional:       Appearance: Normal appearance. She is well-developed.   Cardiovascular:      Rate and Rhythm: Normal rate and regular " rhythm.      Heart sounds: Normal heart sounds.   Pulmonary:      Effort: Pulmonary effort is normal.      Breath sounds: Normal breath sounds.   Musculoskeletal:         General: No swelling. Normal range of motion.   Skin:     General: Skin is warm and dry.      Findings: No rash.   Neurological:      General: No focal deficit present.      Mental Status: She is alert and oriented to person, place, and time.   Psychiatric:         Mood and Affect: Mood normal.         Behavior: Behavior normal.           Assessment/Plan   Diagnoses and all orders for this visit:    1. Mixed hyperlipidemia (Primary)    2. Atherosclerosis of both carotid arteries    3. Premature ventricular contractions    4. Atrial ectopy    5. Essential hypertension  -     furosemide (LASIX) 40 MG tablet; Take 1 tablet by mouth Daily.  Dispense: 90 tablet; Refill: 3    6. Grade I diastolic dysfunction    7. Other specified hypothyroidism    8. Chronic constipation    9. Pulmonary hypertension (HCC)  -     Ambulatory Referral to Pulmonology    10. Abnormal CBC    11. Primary osteoarthritis of hip, unspecified laterality    12. Seasonal allergic rhinitis, unspecified trigger  -     fluticasone (FLONASE) 50 MCG/ACT nasal spray; 2 sprays by Each Nare route Daily.  Dispense: 48 g; Refill: 3    13. Arthritis of left hip  -     diclofenac (VOLTAREN) 75 MG EC tablet; Take 1 tablet by mouth 2 (Two) Times a Day. ONLY TAKING DAILY  Dispense: 60 tablet; Refill: 1    Other orders  -     montelukast (Singulair) 10 MG tablet; Take 1 tablet by mouth Every Night.  Dispense: 90 tablet; Refill: 3  -     Synthroid 75 MCG tablet; Take 1 tablet by mouth Daily. TAKE ONE TABLET DAILY  Dispense: 90 tablet; Refill: 3        Cont meds, f/u in 6 months. Will refer as requested.

## 2022-03-30 RX ORDER — DILTIAZEM HYDROCHLORIDE 180 MG/1
360 CAPSULE, COATED, EXTENDED RELEASE ORAL DAILY
Qty: 60 CAPSULE | Refills: 1 | Status: SHIPPED | OUTPATIENT
Start: 2022-03-30 | End: 2022-06-04 | Stop reason: HOSPADM

## 2022-03-30 NOTE — TELEPHONE ENCOUNTER
Pt called and would like to have the Diltiazem 180 mg bid sent to her pharmacy to see if it would be cheaper.

## 2022-03-31 ENCOUNTER — TELEPHONE (OUTPATIENT)
Dept: FAMILY MEDICINE CLINIC | Facility: CLINIC | Age: 77
End: 2022-03-31

## 2022-03-31 NOTE — TELEPHONE ENCOUNTER
Caller: Hillary Crenshaw    Relationship: Self    Best call back number: [phone]    What is the best time to reach you: ANY    Who are you requesting to speak with (clinical staff, provider,  specific staff member): DR. VORA     What was the call regarding: PT REQUEST TO SPEAK WITH DR. SAENZ ABOUT A RECENT INJURY. REFUSED TO MAKE APPT OR SPEAK WITH NURSE.     Do you require a callback: YES

## 2022-04-01 NOTE — TELEPHONE ENCOUNTER
Lvm informing pt that Dr. Muñoz is out today and next week, was trying to see if I could help answer any questions she might have

## 2022-04-06 ENCOUNTER — TRANSCRIBE ORDERS (OUTPATIENT)
Dept: ADMINISTRATIVE | Facility: HOSPITAL | Age: 77
End: 2022-04-06

## 2022-04-06 DIAGNOSIS — Z12.31 VISIT FOR SCREENING MAMMOGRAM: Primary | ICD-10-CM

## 2022-04-13 ENCOUNTER — TELEMEDICINE (OUTPATIENT)
Dept: FAMILY MEDICINE CLINIC | Facility: CLINIC | Age: 77
End: 2022-04-13
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
## 2022-04-13 PROCEDURE — 99214 OFFICE O/P EST MOD 30 MIN: CPT | Performed by: FAMILY MEDICINE

## 2022-04-13 NOTE — PROGRESS NOTES
Subjective   Hillary Crenshaw is a 76 y.o. female.     Chief Complaint   Patient presents with   • Pain       History of Present Illness   Pt fell and hit her face about 6 months ago and landed on her nose. She felt it was better so she left it alone but then it has not felt quite right. Has eye pain and eye doctor told her her eyes are ok.     The following portions of the patient's history were reviewed and updated as appropriate: allergies, current medications, past family history, past medical history, past social history, past surgical history and problem list.    Past Medical History:   Diagnosis Date   • Abnormal stool test    • Acute allergic rhinitis    • Acute sinusitis    • Allergic rhinitis    • Ankle pain    • Arthritis of left hip    • Atherosclerosis of both carotid arteries     2021: right 16-49%, left with plaque   • Atrial ectopy    • Boil, thigh    • Chronic constipation    • Gastritis    • GERD (gastroesophageal reflux disease)    • Grade I diastolic dysfunction 6/1/2021   • Head ache    • Hyperlipidemia    • Hypertension    • Hypervitaminosis D    • Hypothyroidism    • Lung nodule    • Osteoarthritis     left hip   • Osteopenia    • Pessary maintenance    • Post-menopause    • Premature ventricular contractions    • Prolapse of female bladder, acquired    • PSVT (paroxysmal supraventricular tachycardia) (HCC)    • Pulmonary hypertension (HCC)     Severe per echo June 2021   • Rectal bleeding    • Right foot pain    • Urine frequency    • UTI (urinary tract infection)        Past Surgical History:   Procedure Laterality Date   • FOOT SURGERY     • TOTAL HIP ARTHROPLASTY         Family History   Problem Relation Age of Onset   • Breast cancer Mother    • Heart attack Father    • Alcohol abuse Sister    • Arthritis Sister    • Diabetes Sister    • Hyperlipidemia Sister    • Osteoporosis Sister    • Thyroid disease Sister    • Alcohol abuse Brother    • Hypertension Brother    • Hyperlipidemia  Brother    • Lung cancer Brother    • Heart attack Brother    • Bipolar disorder Maternal Grandmother    • Breast cancer Maternal Grandmother    • Depression Maternal Grandmother    • Other Other         cardiac disorder, neoplasm of breast   • No Known Problems Maternal Grandfather    • No Known Problems Paternal Grandmother    • No Known Problems Paternal Grandfather        Social History     Socioeconomic History   • Marital status:    Tobacco Use   • Smoking status: Former Smoker     Quit date:      Years since quittin.3   • Smokeless tobacco: Never Used   Vaping Use   • Vaping Use: Never used   Substance and Sexual Activity   • Alcohol use: Yes     Alcohol/week: 1.0 standard drink     Types: 1 Glasses of wine per week     Comment: 7 drinks week/// Caffeine use: 2-3 cups daily   • Drug use: No   • Sexual activity: Defer       Review of Systems   Constitutional: Negative for fever.   Eyes: Negative for visual disturbance.       Objective   There were no vitals taken for this visit.  There is no height or weight on file to calculate BMI.  Physical Exam      Assessment/Plan   Diagnoses and all orders for this visit:    1. Fall, initial encounter (Primary)  -     MRI Brain With & Without Contrast; Future        F/u as needed.  Consent to video visit secondary to the pandemic and spent 11 minutes.

## 2022-04-18 ENCOUNTER — TELEPHONE (OUTPATIENT)
Dept: FAMILY MEDICINE CLINIC | Facility: CLINIC | Age: 77
End: 2022-04-18

## 2022-04-18 ENCOUNTER — TELEPHONE (OUTPATIENT)
Dept: CARDIOLOGY | Facility: CLINIC | Age: 77
End: 2022-04-18

## 2022-04-18 NOTE — TELEPHONE ENCOUNTER
I didn't plan on her taking 180mg BID -- it should have been 2 capsules once per day, to be the same as what she was on before (360mg daily).    She should really meet with Dr De La Torre again regarding the PVC ablation.     bentley

## 2022-04-18 NOTE — TELEPHONE ENCOUNTER
sched please call to sched with dr levine...      Pt is taking diltazem tablets together and she is interested in seeing Dr. levine for ablation will forward to scheduling to get her an appt made.    lambert

## 2022-04-18 NOTE — TELEPHONE ENCOUNTER
[facility]'s diagnostic center called and states the patient was scheduled for a screening mammogram but it was previously recommended that she come back for a bilateral diagnostic mammo.  They are calling the patient to reschedule her appointment she has on 04/27 because they can't do the diagnostic during that time slot.  They are needing a new order please for for the diagnostic mammo.  The original one she said was for a screening.

## 2022-04-18 NOTE — TELEPHONE ENCOUNTER
Spoke with pt, stating her diltazem 180 mg bid was decreased from diltazem 360 mg qd due to price and states it is not working and she hasn't had any energy and SOA since starting it, also states she is gaining weight. She wants to go back to original dosing, states she does not want to live like this anymore. Also states if you want her to get the surgery she will. Would like to speak to you.    dd

## 2022-04-20 ENCOUNTER — HOSPITAL ENCOUNTER (OUTPATIENT)
Dept: MRI IMAGING | Facility: HOSPITAL | Age: 77
Discharge: HOME | End: 2022-04-20
Admitting: FAMILY MEDICINE

## 2022-04-20 DIAGNOSIS — R92.8 ABNORMAL MAMMOGRAM: Primary | ICD-10-CM
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
## 2022-04-20 PROCEDURE — 70553 MRI BRAIN STEM W/O & W/DYE: CPT

## 2022-04-20 PROCEDURE — A9577 INJ MULTIHANCE: HCPCS | Performed by: FAMILY MEDICINE

## 2022-04-20 PROCEDURE — 82565 ASSAY OF CREATININE: CPT

## 2022-04-20 PROCEDURE — 0 GADOBENATE DIMEGLUMINE 529 MG/ML SOLUTION: Performed by: FAMILY MEDICINE

## 2022-04-20 RX ADMIN — GADOBENATE DIMEGLUMINE 18 ML: 529 INJECTION, SOLUTION INTRAVENOUS at 18:39

## 2022-04-23 LAB — CREAT BLDA-MCNC: 1 MG/DL (ref 0.6–1.3)

## 2022-04-25 ENCOUNTER — TRANSCRIBE ORDERS (OUTPATIENT)
Dept: ADMINISTRATIVE | Facility: HOSPITAL | Age: 77
End: 2022-04-25

## 2022-04-25 DIAGNOSIS — R92.8 ABNORMAL MAMMOGRAM: Primary | ICD-10-CM

## 2022-04-27 ENCOUNTER — APPOINTMENT (OUTPATIENT)
Dept: MAMMOGRAPHY | Facility: HOSPITAL | Age: 77
End: 2022-04-27

## 2022-05-06 ENCOUNTER — OFFICE VISIT (OUTPATIENT)
Dept: FAMILY MEDICINE CLINIC | Facility: CLINIC | Age: 77
End: 2022-05-06

## 2022-05-06 ENCOUNTER — TELEPHONE (OUTPATIENT)
Dept: FAMILY MEDICINE CLINIC | Facility: CLINIC | Age: 77
End: 2022-05-06

## 2022-05-06 DIAGNOSIS — J01.10 ACUTE NON-RECURRENT FRONTAL SINUSITIS: Primary | ICD-10-CM

## 2022-05-06 PROCEDURE — 99213 OFFICE O/P EST LOW 20 MIN: CPT | Performed by: NURSE PRACTITIONER

## 2022-05-06 RX ORDER — DOXYCYCLINE 100 MG/1
100 CAPSULE ORAL 2 TIMES DAILY
Qty: 14 CAPSULE | Refills: 0 | Status: SHIPPED | OUTPATIENT
Start: 2022-05-06 | End: 2022-05-13

## 2022-05-06 NOTE — TELEPHONE ENCOUNTER
Called patient and appointment made today for Sarita ROCHE   Curettage Text: The wound bed was treated with curettage after the biopsy was performed.

## 2022-05-06 NOTE — PROGRESS NOTES
Chief Complaint  Sinusitis  You have chosen to receive care through a telehealth visit.  Do you consent to use a video/audio connection for your medical care today? Yes  Subjective          Hillary Crenshaw presents to [facility]  This is my first time seeing this patient.    Sinusitis  This is a new problem. The current episode started 1 to 4 weeks ago. There has been no fever. Her pain is at a severity of 8/10. Associated symptoms include congestion, coughing, headaches, shortness of breath (mild), sinus pressure, sneezing and a sore throat. Pertinent negatives include no ear pain. Past treatments include acetaminophen (mucinex, nyquil). The treatment provided mild relief.       Review of Systems   Constitutional: Positive for fatigue. Negative for fever.   HENT: Positive for congestion, postnasal drip, rhinorrhea, sinus pressure, sneezing and sore throat. Negative for ear discharge and ear pain.    Eyes: Positive for pain. Negative for redness and itching.   Respiratory: Positive for cough, shortness of breath (mild) and wheezing (mild).    Gastrointestinal: Negative for abdominal pain, diarrhea, nausea and vomiting.   Neurological: Positive for headache. Negative for dizziness.      Objective   Vital Signs:   There were no vitals taken for this visit.    Physical Exam  Constitutional:       General: She is awake.   Neurological:      Mental Status: She is alert.   Psychiatric:         Attention and Perception: Attention normal.         Speech: Speech normal.         Behavior: Behavior is cooperative.        Result Review :     Assessment and Plan    Diagnoses and all orders for this visit:    1. Acute non-recurrent frontal sinusitis (Primary)  -     doxycycline (MONODOX) 100 MG capsule; Take 1 capsule by mouth 2 (Two) Times a Day for 7 days.  Dispense: 14 capsule; Refill: 0    Discussed with patient that symptoms indicative of sinusitis.  Prescribed doxycycline to help clear  infection.  Educated patient that she can use Mucinex to help with congestion and sinus pressure, can continue using Tylenol to help with headache and sinus pressure.    I spent 25 minutes caring for Hillary on this date of service. This time includes time spent by me in the following activities:obtaining and/or reviewing a separately obtained history, counseling and educating the patient/family/caregiver, ordering medications, tests, or procedures and documenting information in the medical record     Follow Up   Return if symptoms worsen or fail to improve.  Patient was given instructions and counseling regarding her condition or for health maintenance advice. Please see specific information pulled into the AVS if appropriate.

## 2022-05-06 NOTE — TELEPHONE ENCOUNTER
Caller: Den Hillary    Relationship: Self    Best call back number: [phone]    What medication are you requesting: ANTIBIOTIC OR A STEROID PACK    What are your current symptoms: SINUS PAIN, COUGH    How long have you been experiencing symptoms: ONE WEEK      Have you had these symptoms before:    [] Yes  [x] No    Have you been treated for these symptoms before:   [] Yes  [x] No    If a prescription is needed, what is your preferred pharmacy and phone number: [facility] [address] AT [facility] & ([facility] [phone] [facility] [phone] FX     Additional notes: PLEASE ADVISE, OTC MEDICATIONS ARE NOT WORKING

## 2022-05-20 ENCOUNTER — HOSPITAL ENCOUNTER (OUTPATIENT)
Dept: ULTRASOUND IMAGING | Facility: HOSPITAL | Age: 77
Discharge: HOME | End: 2022-05-20

## 2022-05-20 ENCOUNTER — HOSPITAL ENCOUNTER (OUTPATIENT)
Dept: MAMMOGRAPHY | Facility: HOSPITAL | Age: 77
Discharge: HOME | End: 2022-05-20

## 2022-05-20 DIAGNOSIS — R92.8 ABNORMAL MAMMOGRAM: ICD-10-CM

## 2022-05-20 PROCEDURE — 77066 DX MAMMO INCL CAD BI: CPT

## 2022-05-20 PROCEDURE — 76642 ULTRASOUND BREAST LIMITED: CPT

## 2022-05-20 PROCEDURE — G0279 TOMOSYNTHESIS, MAMMO: HCPCS

## 2022-05-24 ENCOUNTER — OFFICE VISIT (OUTPATIENT)
Dept: CARDIOLOGY | Facility: CLINIC | Age: 77
End: 2022-05-24

## 2022-05-24 VITALS
SYSTOLIC BLOOD PRESSURE: 110 MMHG | BODY MASS INDEX: 32.78 KG/M2 | WEIGHT: 192 LBS | HEART RATE: 57 BPM | DIASTOLIC BLOOD PRESSURE: 72 MMHG | HEIGHT: 64 IN

## 2022-05-24 DIAGNOSIS — I49.3 PREMATURE VENTRICULAR CONTRACTIONS: Primary | ICD-10-CM

## 2022-05-24 DIAGNOSIS — I10 ESSENTIAL HYPERTENSION: ICD-10-CM

## 2022-05-24 PROCEDURE — 93000 ELECTROCARDIOGRAM COMPLETE: CPT

## 2022-05-24 PROCEDURE — 99213 OFFICE O/P EST LOW 20 MIN: CPT

## 2022-05-24 NOTE — PROGRESS NOTES
Date of Office Visit: 2022  Encounter Provider: JUSTIN Mora  Place of Service: [facility] CARDIOLOGY  Patient Name: Hillary Crenshaw  :1945    Chief Complaint   Patient presents with   • PVCs     Discuss ablation   :     HPI: Hillary Crenshaw is a 76 y.o. female who is a patient of Dr. Mayberry and Dr. De La Torre.  She has a history of PACs, PVCs, htn, and hypothyroidism.     She was first seen by Dr. De La Torre on 2021.  At that time she was having frequent PVCs.  She was first diagnosed with them almost 30 years ago.  They seemed to have gotten worse over the past several years.  She was also having symptoms of dizziness and one episode of syncope.  Dr. De La Torre discussed ablation with her and she elected to proceed.  The procedure was scheduled for 2021 but she was nervous and elected to cancel and ended up not following up.    Over the past few months she has been discussing things with Dr. Mayberry.  She has been having increased palpitations and her diltiazem was increased to 360mg daily.  She continues to have episodes of palpitations and dizziness despite medical therapy.  We have been asked to see her again and discuss ablation further.             She presents today for follow up appt.     Patient says that her palpitations have continued to get worse.  She called Dr. Mayberry in April who increased her diltiazem but patient does not think it is helping.     She also complains of occasional shortness of breath associated with her palpitations.      She denies any chest pain, PND, orthopnea, or edema.     Last echo in  showed EF of 53%.    She wore a holter last year which revealed 50% PVC burden.     She is on diltiazem 360mg daily               Past Medical History:   Diagnosis Date   • Abnormal stool test    • Acute allergic rhinitis    • Acute sinusitis    • Allergic rhinitis    • Ankle pain    • Arthritis of left hip    • Atherosclerosis of both carotid  arteries     : right 16-49%, left with plaque   • Atrial ectopy    • Boil, thigh    • Chronic constipation    • Gastritis    • GERD (gastroesophageal reflux disease)    • Grade I diastolic dysfunction 2021   • Head ache    • Hyperlipidemia    • Hypertension    • Hypervitaminosis D    • Hypothyroidism    • Lung nodule    • Osteoarthritis     left hip   • Osteopenia    • Pessary maintenance    • Post-menopause    • Premature ventricular contractions    • Prolapse of female bladder, acquired    • PSVT (paroxysmal supraventricular tachycardia) (HCC)    • Pulmonary hypertension (HCC)     Severe per echo 2021   • Rectal bleeding    • Right foot pain    • Urine frequency    • UTI (urinary tract infection)        Past Surgical History:   Procedure Laterality Date   • FOOT SURGERY     • TOTAL HIP ARTHROPLASTY         Social History     Socioeconomic History   • Marital status:    Tobacco Use   • Smoking status: Former Smoker     Quit date:      Years since quittin.4   • Smokeless tobacco: Never Used   Vaping Use   • Vaping Use: Never used   Substance and Sexual Activity   • Alcohol use: Yes     Alcohol/week: 1.0 standard drink     Types: 1 Glasses of wine per week     Comment: 7 drinks week/// Caffeine use: 2-3 cups daily   • Drug use: No   • Sexual activity: Defer       Family History   Problem Relation Age of Onset   • Breast cancer Mother    • Heart attack Father    • Alcohol abuse Sister    • Arthritis Sister    • Diabetes Sister    • Hyperlipidemia Sister    • Osteoporosis Sister    • Thyroid disease Sister    • Alcohol abuse Brother    • Hypertension Brother    • Hyperlipidemia Brother    • Lung cancer Brother    • Heart attack Brother    • Bipolar disorder Maternal Grandmother    • Breast cancer Maternal Grandmother    • Depression Maternal Grandmother    • Other Other         cardiac disorder, neoplasm of breast   • No Known Problems Maternal Grandfather    • No Known Problems Paternal  "Grandmother    • No Known Problems Paternal Grandfather        Review of Systems   Constitutional: Negative for chills, fever and malaise/fatigue.   Cardiovascular: Positive for irregular heartbeat and palpitations. Negative for chest pain, dyspnea on exertion, leg swelling, near-syncope, orthopnea, paroxysmal nocturnal dyspnea and syncope.   Respiratory: Negative for cough and shortness of breath.    Musculoskeletal: Negative for joint pain, joint swelling and myalgias.   Gastrointestinal: Negative for abdominal pain, diarrhea, melena, nausea and vomiting.   Genitourinary: Negative for frequency and hematuria.   Neurological: Positive for dizziness. Negative for light-headedness, numbness, paresthesias and seizures.   Allergic/Immunologic: Negative.    All other systems reviewed and are negative.      No Known Allergies      Current Outpatient Medications:   •  diclofenac (VOLTAREN) 75 MG EC tablet, Take 1 tablet by mouth 2 (Two) Times a Day. ONLY TAKING DAILY, Disp: 60 tablet, Rfl: 1  •  dilTIAZem CD (Cardizem CD) 180 MG 24 hr capsule, Take 2 capsules by mouth Daily., Disp: 60 capsule, Rfl: 1  •  fluticasone (FLONASE) 50 MCG/ACT nasal spray, 2 sprays by Each Nare route Daily., Disp: 48 g, Rfl: 3  •  furosemide (LASIX) 40 MG tablet, Take 1 tablet by mouth Daily., Disp: 90 tablet, Rfl: 3  •  montelukast (Singulair) 10 MG tablet, Take 1 tablet by mouth Every Night., Disp: 90 tablet, Rfl: 3  •  Sodium Fluoride 5000 Sensitive 1.1-5 % gel, , Disp: , Rfl:   •  Stool Softener 100 MG capsule, TAKE ONE CAPSULE BY MOUTH TWICE A DAY AS NEEDED FOR CONSTIPATION, Disp: 180 capsule, Rfl: 3  •  Synthroid 75 MCG tablet, Take 1 tablet by mouth Daily. TAKE ONE TABLET DAILY, Disp: 90 tablet, Rfl: 3  •  dilTIAZem CD (CARDIZEM CD) 360 MG 24 hr capsule, Take 1 capsule by mouth Daily., Disp: 90 capsule, Rfl: 3      Objective:     Vitals:    05/24/22 1010   BP: 110/72   Pulse: 57   Weight: 87.1 kg (192 lb)   Height: 162.6 cm (64\")     Body " mass index is 32.96 kg/m².    PHYSICAL EXAM:    Vitals Reviewed.   General Appearance: No acute distress, well developed and well nourished.   Eyes: Conjunctiva and lids: No erythema, swelling, or discharge. Sclera non-icteric.   HENT: Atraumatic, normocephalic. External eyes, ears, and nose normal.   Respiratory: No signs of respiratory distress. Respiration rhythm and depth normal.   Clear to auscultation. No rales, crackles, rhonchi, or wheezing auscultated.   Cardiovascular:  Heart Rate and Rhythm: Normal, Heart Sounds: Normal S1 and S2. No S3 or S4 noted.  Murmurs: No murmurs noted. No rubs, thrills, or gallops.   Lower Extremities: No edema noted.  Gastrointestinal:  Abdomen soft, non-distended, non-tender.   Musculoskeletal: Normal movement of extremities  Skin: Warm and dry.   Psychiatric: Patient alert and oriented to person, place, and time. Speech and behavior appropriate. Normal mood and affect.       ECG 12 Lead    Date/Time: 5/24/2022 11:35 AM  Performed by: Tristen Brooks APRN  Authorized by: Tristen Brooks APRN   Comparison: compared with previous ECG   Similar to previous ECG  Rhythm: sinus rhythm  Ectopy: unifocal PVCs  BPM: 57                Assessment:       Diagnosis Plan   1. Premature ventricular contractions  Case Request EP Lab: Ablation PVC   2. Essential hypertension            Plan:   1. PVCs- patient continues to have frequent PVCs that are associated with palpitations, dyspnea, and dizziness despite medical therapy. Her diltiazem was increased with no improvement.     Dr. De La Torre saw the patient with me and further discussed PVC ablation. He feels like PVC ablation offers her the best chance of cure of her PVCS.  He discussed with her the risk of stroke, tamponade, and death.  Patient expressed understanding and wishes to proceed with the procedure.      Order has been placed, we will work to get her scheduled.      As always, it has been a pleasure to participate in your patient's  care.      Sincerely,         JUSTIN Mora

## 2022-05-25 ENCOUNTER — TRANSCRIBE ORDERS (OUTPATIENT)
Dept: ADMINISTRATIVE | Facility: HOSPITAL | Age: 77
End: 2022-05-25

## 2022-05-25 DIAGNOSIS — Z01.818 OTHER SPECIFIED PRE-OPERATIVE EXAMINATION: Primary | ICD-10-CM

## 2022-05-31 ENCOUNTER — TRANSCRIBE ORDERS (OUTPATIENT)
Dept: CARDIOLOGY | Facility: CLINIC | Age: 77
End: 2022-05-31

## 2022-05-31 DIAGNOSIS — Z01.810 PREPROCEDURAL CARDIOVASCULAR EXAMINATION: ICD-10-CM

## 2022-05-31 DIAGNOSIS — Z01.818 OTHER SPECIFIED PRE-OPERATIVE EXAMINATION: ICD-10-CM

## 2022-05-31 DIAGNOSIS — Z13.6 SCREENING FOR CARDIOVASCULAR CONDITION: Primary | ICD-10-CM

## 2022-06-01 ENCOUNTER — LAB (OUTPATIENT)
Dept: LAB | Facility: HOSPITAL | Age: 77
End: 2022-06-01

## 2022-06-01 DIAGNOSIS — Z01.810 PREPROCEDURAL CARDIOVASCULAR EXAMINATION: ICD-10-CM

## 2022-06-01 DIAGNOSIS — Z13.6 SCREENING FOR CARDIOVASCULAR CONDITION: ICD-10-CM

## 2022-06-01 DIAGNOSIS — Z01.818 OTHER SPECIFIED PRE-OPERATIVE EXAMINATION: ICD-10-CM

## 2022-06-01 LAB
ANION GAP SERPL CALCULATED.3IONS-SCNC: 8.1 MMOL/L (ref 5–15)
BASOPHILS # BLD AUTO: 0.06 10*3/MM3 (ref 0–0.2)
BASOPHILS NFR BLD AUTO: 0.9 % (ref 0–1.5)
BUN SERPL-MCNC: 13 MG/DL (ref 8–23)
BUN/CREAT SERPL: 18.8 (ref 7–25)
CALCIUM SPEC-SCNC: 9.8 MG/DL (ref 8.6–10.5)
CHLORIDE SERPL-SCNC: 105 MMOL/L (ref 98–107)
CO2 SERPL-SCNC: 23.9 MMOL/L (ref 22–29)
CREAT SERPL-MCNC: 0.69 MG/DL (ref 0.57–1)
DEPRECATED RDW RBC AUTO: 39.6 FL (ref 37–54)
EGFRCR SERPLBLD CKD-EPI 2021: 90.1 ML/MIN/1.73
EOSINOPHIL # BLD AUTO: 0.16 10*3/MM3 (ref 0–0.4)
EOSINOPHIL NFR BLD AUTO: 2.4 % (ref 0.3–6.2)
ERYTHROCYTE [DISTWIDTH] IN BLOOD BY AUTOMATED COUNT: 12.4 % (ref 12.3–15.4)
GLUCOSE SERPL-MCNC: 122 MG/DL (ref 65–99)
HCT VFR BLD AUTO: 43.9 % (ref 34–46.6)
HGB BLD-MCNC: 14.4 G/DL (ref 12–15.9)
IMM GRANULOCYTES # BLD AUTO: 0.02 10*3/MM3 (ref 0–0.05)
IMM GRANULOCYTES NFR BLD AUTO: 0.3 % (ref 0–0.5)
LYMPHOCYTES # BLD AUTO: 2 10*3/MM3 (ref 0.7–3.1)
LYMPHOCYTES NFR BLD AUTO: 30.3 % (ref 19.6–45.3)
MCH RBC QN AUTO: 28.9 PG (ref 26.6–33)
MCHC RBC AUTO-ENTMCNC: 32.8 G/DL (ref 31.5–35.7)
MCV RBC AUTO: 88.2 FL (ref 79–97)
MONOCYTES # BLD AUTO: 0.55 10*3/MM3 (ref 0.1–0.9)
MONOCYTES NFR BLD AUTO: 8.3 % (ref 5–12)
NEUTROPHILS NFR BLD AUTO: 3.82 10*3/MM3 (ref 1.7–7)
NEUTROPHILS NFR BLD AUTO: 57.8 % (ref 42.7–76)
NRBC BLD AUTO-RTO: 0 /100 WBC (ref 0–0.2)
PLATELET # BLD AUTO: 198 10*3/MM3 (ref 140–450)
PMV BLD AUTO: 11.4 FL (ref 6–12)
POTASSIUM SERPL-SCNC: 4.7 MMOL/L (ref 3.5–5.2)
RBC # BLD AUTO: 4.98 10*6/MM3 (ref 3.77–5.28)
SARS-COV-2 ORF1AB RESP QL NAA+PROBE: NOT DETECTED
SODIUM SERPL-SCNC: 137 MMOL/L (ref 136–145)
WBC NRBC COR # BLD: 6.61 10*3/MM3 (ref 3.4–10.8)

## 2022-06-01 PROCEDURE — 36415 COLL VENOUS BLD VENIPUNCTURE: CPT

## 2022-06-01 PROCEDURE — 85025 COMPLETE CBC W/AUTO DIFF WBC: CPT

## 2022-06-01 PROCEDURE — 80048 BASIC METABOLIC PNL TOTAL CA: CPT

## 2022-06-01 PROCEDURE — U0005 INFEC AGEN DETEC AMPLI PROBE: HCPCS

## 2022-06-01 PROCEDURE — U0004 COV-19 TEST NON-CDC HGH THRU: HCPCS

## 2022-06-01 PROCEDURE — C9803 HOPD COVID-19 SPEC COLLECT: HCPCS

## 2022-06-03 ENCOUNTER — HOSPITAL ENCOUNTER (OUTPATIENT)
Facility: HOSPITAL | Age: 77
Discharge: HOME | End: 2022-06-04
Attending: INTERNAL MEDICINE | Admitting: INTERNAL MEDICINE

## 2022-06-03 DIAGNOSIS — I49.3 PREMATURE VENTRICULAR CONTRACTIONS: ICD-10-CM

## 2022-06-03 LAB
ACT BLD: 285 SECONDS (ref 82–152)
ACT BLD: 297 SECONDS (ref 82–152)
ACT BLD: 327 SECONDS (ref 82–152)
ACT BLD: 345 SECONDS (ref 82–152)
ACT BLD: 380 SECONDS (ref 82–152)
QT INTERVAL: 451 MS
QT INTERVAL: 484 MS

## 2022-06-03 PROCEDURE — C1732 CATH, EP, DIAG/ABL, 3D/VECT: HCPCS | Performed by: INTERNAL MEDICINE

## 2022-06-03 PROCEDURE — G0378 HOSPITAL OBSERVATION PER HR: HCPCS

## 2022-06-03 PROCEDURE — 25010000002 FENTANYL CITRATE (PF) 50 MCG/ML SOLUTION: Performed by: INTERNAL MEDICINE

## 2022-06-03 PROCEDURE — 63710000001 FUROSEMIDE 40 MG TABLET: Performed by: INTERNAL MEDICINE

## 2022-06-03 PROCEDURE — 63710000001 MONTELUKAST 10 MG TABLET: Performed by: INTERNAL MEDICINE

## 2022-06-03 PROCEDURE — 93005 ELECTROCARDIOGRAM TRACING: CPT | Performed by: INTERNAL MEDICINE

## 2022-06-03 PROCEDURE — C1894 INTRO/SHEATH, NON-LASER: HCPCS | Performed by: INTERNAL MEDICINE

## 2022-06-03 PROCEDURE — C1769 GUIDE WIRE: HCPCS | Performed by: INTERNAL MEDICINE

## 2022-06-03 PROCEDURE — A9270 NON-COVERED ITEM OR SERVICE: HCPCS | Performed by: INTERNAL MEDICINE

## 2022-06-03 PROCEDURE — 93655 ICAR CATH ABLTJ DSCRT ARRHYT: CPT | Performed by: INTERNAL MEDICINE

## 2022-06-03 PROCEDURE — 25010000002 HYDRALAZINE PER 20 MG: Performed by: NURSE PRACTITIONER

## 2022-06-03 PROCEDURE — C1760 CLOSURE DEV, VASC: HCPCS | Performed by: INTERNAL MEDICINE

## 2022-06-03 PROCEDURE — C1893 INTRO/SHEATH, FIXED,NON-PEEL: HCPCS | Performed by: INTERNAL MEDICINE

## 2022-06-03 PROCEDURE — 0 IOPAMIDOL PER 1 ML: Performed by: INTERNAL MEDICINE

## 2022-06-03 PROCEDURE — 63710000001 LOSARTAN 25 MG TABLET: Performed by: INTERNAL MEDICINE

## 2022-06-03 PROCEDURE — 25010000002 HEPARIN (PORCINE) PER 1000 UNITS: Performed by: INTERNAL MEDICINE

## 2022-06-03 PROCEDURE — 25010000002 MIDAZOLAM PER 1 MG: Performed by: INTERNAL MEDICINE

## 2022-06-03 PROCEDURE — 85347 COAGULATION TIME ACTIVATED: CPT

## 2022-06-03 PROCEDURE — 63710000001 ACETAMINOPHEN 325 MG TABLET: Performed by: INTERNAL MEDICINE

## 2022-06-03 PROCEDURE — 93462 L HRT CATH TRNSPTL PUNCTURE: CPT | Performed by: INTERNAL MEDICINE

## 2022-06-03 PROCEDURE — 93010 ELECTROCARDIOGRAM REPORT: CPT | Performed by: INTERNAL MEDICINE

## 2022-06-03 PROCEDURE — 93662 INTRACARDIAC ECG (ICE): CPT | Performed by: INTERNAL MEDICINE

## 2022-06-03 PROCEDURE — 93654 COMPRE EP EVAL TX VT: CPT | Performed by: INTERNAL MEDICINE

## 2022-06-03 PROCEDURE — 25010000002 PROTAMINE SULFATE PER 10 MG: Performed by: INTERNAL MEDICINE

## 2022-06-03 PROCEDURE — 25010000002 HYDRALAZINE PER 20 MG: Performed by: INTERNAL MEDICINE

## 2022-06-03 PROCEDURE — C1759 CATH, INTRA ECHOCARDIOGRAPHY: HCPCS | Performed by: INTERNAL MEDICINE

## 2022-06-03 RX ORDER — LIDOCAINE HYDROCHLORIDE AND EPINEPHRINE 10; 10 MG/ML; UG/ML
INJECTION, SOLUTION INFILTRATION; PERINEURAL AS NEEDED
Status: DISCONTINUED | OUTPATIENT
Start: 2022-06-03 | End: 2022-06-03 | Stop reason: HOSPADM

## 2022-06-03 RX ORDER — ONDANSETRON 2 MG/ML
4 INJECTION INTRAMUSCULAR; INTRAVENOUS EVERY 6 HOURS PRN
Status: DISCONTINUED | OUTPATIENT
Start: 2022-06-03 | End: 2022-06-04 | Stop reason: HOSPADM

## 2022-06-03 RX ORDER — MONTELUKAST SODIUM 10 MG/1
10 TABLET ORAL NIGHTLY
Status: DISCONTINUED | OUTPATIENT
Start: 2022-06-03 | End: 2022-06-04 | Stop reason: HOSPADM

## 2022-06-03 RX ORDER — SODIUM CHLORIDE 0.9 % (FLUSH) 0.9 %
10 SYRINGE (ML) INJECTION EVERY 12 HOURS SCHEDULED
Status: DISCONTINUED | OUTPATIENT
Start: 2022-06-03 | End: 2022-06-03 | Stop reason: HOSPADM

## 2022-06-03 RX ORDER — PROTAMINE SULFATE 10 MG/ML
INJECTION, SOLUTION INTRAVENOUS AS NEEDED
Status: DISCONTINUED | OUTPATIENT
Start: 2022-06-03 | End: 2022-06-03 | Stop reason: HOSPADM

## 2022-06-03 RX ORDER — ACETAMINOPHEN 325 MG/1
650 TABLET ORAL EVERY 4 HOURS PRN
Status: DISCONTINUED | OUTPATIENT
Start: 2022-06-03 | End: 2022-06-04 | Stop reason: HOSPADM

## 2022-06-03 RX ORDER — HYDROCODONE BITARTRATE AND ACETAMINOPHEN 5; 325 MG/1; MG/1
1 TABLET ORAL EVERY 6 HOURS PRN
Status: DISCONTINUED | OUTPATIENT
Start: 2022-06-03 | End: 2022-06-04 | Stop reason: HOSPADM

## 2022-06-03 RX ORDER — MIDAZOLAM HYDROCHLORIDE 1 MG/ML
INJECTION INTRAMUSCULAR; INTRAVENOUS AS NEEDED
Status: DISCONTINUED | OUTPATIENT
Start: 2022-06-03 | End: 2022-06-03 | Stop reason: HOSPADM

## 2022-06-03 RX ORDER — SODIUM CHLORIDE 0.9 % (FLUSH) 0.9 %
3 SYRINGE (ML) INJECTION EVERY 12 HOURS SCHEDULED
Status: DISCONTINUED | OUTPATIENT
Start: 2022-06-03 | End: 2022-06-04 | Stop reason: HOSPADM

## 2022-06-03 RX ORDER — NALOXONE HCL 0.4 MG/ML
0.4 VIAL (ML) INJECTION
Status: DISCONTINUED | OUTPATIENT
Start: 2022-06-03 | End: 2022-06-04 | Stop reason: HOSPADM

## 2022-06-03 RX ORDER — HYDRALAZINE HYDROCHLORIDE 20 MG/ML
20 INJECTION INTRAMUSCULAR; INTRAVENOUS ONCE
Status: COMPLETED | OUTPATIENT
Start: 2022-06-03 | End: 2022-06-03

## 2022-06-03 RX ORDER — LOSARTAN POTASSIUM 25 MG/1
25 TABLET ORAL 2 TIMES DAILY
Status: DISCONTINUED | OUTPATIENT
Start: 2022-06-03 | End: 2022-06-04 | Stop reason: HOSPADM

## 2022-06-03 RX ORDER — SODIUM CHLORIDE 0.9 % (FLUSH) 0.9 %
10 SYRINGE (ML) INJECTION AS NEEDED
Status: DISCONTINUED | OUTPATIENT
Start: 2022-06-03 | End: 2022-06-04 | Stop reason: HOSPADM

## 2022-06-03 RX ORDER — SODIUM CHLORIDE 0.9 % (FLUSH) 0.9 %
10 SYRINGE (ML) INJECTION AS NEEDED
Status: DISCONTINUED | OUTPATIENT
Start: 2022-06-03 | End: 2022-06-03 | Stop reason: HOSPADM

## 2022-06-03 RX ORDER — HYDROMORPHONE HYDROCHLORIDE 1 MG/ML
0.5 INJECTION, SOLUTION INTRAMUSCULAR; INTRAVENOUS; SUBCUTANEOUS
Status: DISCONTINUED | OUTPATIENT
Start: 2022-06-03 | End: 2022-06-04 | Stop reason: HOSPADM

## 2022-06-03 RX ORDER — ACETAMINOPHEN 650 MG/1
650 SUPPOSITORY RECTAL EVERY 4 HOURS PRN
Status: DISCONTINUED | OUTPATIENT
Start: 2022-06-03 | End: 2022-06-04 | Stop reason: HOSPADM

## 2022-06-03 RX ORDER — FENTANYL CITRATE 50 UG/ML
INJECTION, SOLUTION INTRAMUSCULAR; INTRAVENOUS AS NEEDED
Status: DISCONTINUED | OUTPATIENT
Start: 2022-06-03 | End: 2022-06-03 | Stop reason: HOSPADM

## 2022-06-03 RX ORDER — SODIUM CHLORIDE 9 MG/ML
75 INJECTION, SOLUTION INTRAVENOUS CONTINUOUS
Status: DISCONTINUED | OUTPATIENT
Start: 2022-06-03 | End: 2022-06-04 | Stop reason: HOSPADM

## 2022-06-03 RX ORDER — FUROSEMIDE 40 MG/1
40 TABLET ORAL DAILY
Status: DISCONTINUED | OUTPATIENT
Start: 2022-06-03 | End: 2022-06-04 | Stop reason: HOSPADM

## 2022-06-03 RX ORDER — HEPARIN SODIUM 1000 [USP'U]/ML
INJECTION, SOLUTION INTRAVENOUS; SUBCUTANEOUS AS NEEDED
Status: DISCONTINUED | OUTPATIENT
Start: 2022-06-03 | End: 2022-06-03 | Stop reason: HOSPADM

## 2022-06-03 RX ORDER — LEVOTHYROXINE SODIUM 0.07 MG/1
75 TABLET ORAL
Status: DISCONTINUED | OUTPATIENT
Start: 2022-06-04 | End: 2022-06-04 | Stop reason: HOSPADM

## 2022-06-03 RX ADMIN — SODIUM CHLORIDE 75 ML/HR: 9 INJECTION, SOLUTION INTRAVENOUS at 20:43

## 2022-06-03 RX ADMIN — ACETAMINOPHEN 650 MG: 325 TABLET, FILM COATED ORAL at 20:34

## 2022-06-03 RX ADMIN — HYDRALAZINE HYDROCHLORIDE 20 MG: 20 INJECTION INTRAMUSCULAR; INTRAVENOUS at 18:37

## 2022-06-03 RX ADMIN — SODIUM CHLORIDE 75 ML/HR: 9 INJECTION, SOLUTION INTRAVENOUS at 09:08

## 2022-06-03 RX ADMIN — Medication 3 ML: at 20:40

## 2022-06-03 RX ADMIN — MONTELUKAST SODIUM 10 MG: 10 TABLET, FILM COATED ORAL at 23:07

## 2022-06-03 RX ADMIN — LOSARTAN POTASSIUM 25 MG: 25 TABLET, FILM COATED ORAL at 23:07

## 2022-06-03 RX ADMIN — FUROSEMIDE 40 MG: 40 TABLET ORAL at 17:07

## 2022-06-04 ENCOUNTER — READMISSION MANAGEMENT (OUTPATIENT)
Dept: CALL CENTER | Facility: HOSPITAL | Age: 77
End: 2022-06-04

## 2022-06-04 ENCOUNTER — APPOINTMENT (OUTPATIENT)
Dept: GENERAL RADIOLOGY | Facility: HOSPITAL | Age: 77
End: 2022-06-04

## 2022-06-04 VITALS
SYSTOLIC BLOOD PRESSURE: 151 MMHG | RESPIRATION RATE: 16 BRPM | OXYGEN SATURATION: 95 % | BODY MASS INDEX: 32.27 KG/M2 | TEMPERATURE: 98 F | WEIGHT: 189 LBS | HEIGHT: 64 IN | HEART RATE: 54 BPM | DIASTOLIC BLOOD PRESSURE: 73 MMHG

## 2022-06-04 LAB — QT INTERVAL: 498 MS

## 2022-06-04 PROCEDURE — 63710000001 LEVOTHYROXINE 75 MCG TABLET: Performed by: INTERNAL MEDICINE

## 2022-06-04 PROCEDURE — 93005 ELECTROCARDIOGRAM TRACING: CPT | Performed by: INTERNAL MEDICINE

## 2022-06-04 PROCEDURE — A9270 NON-COVERED ITEM OR SERVICE: HCPCS | Performed by: INTERNAL MEDICINE

## 2022-06-04 PROCEDURE — 63710000001 LOSARTAN 25 MG TABLET: Performed by: INTERNAL MEDICINE

## 2022-06-04 PROCEDURE — G0378 HOSPITAL OBSERVATION PER HR: HCPCS

## 2022-06-04 PROCEDURE — 99217 PR OBSERVATION CARE DISCHARGE MANAGEMENT: CPT | Performed by: INTERNAL MEDICINE

## 2022-06-04 PROCEDURE — 93010 ELECTROCARDIOGRAM REPORT: CPT | Performed by: INTERNAL MEDICINE

## 2022-06-04 PROCEDURE — 71046 X-RAY EXAM CHEST 2 VIEWS: CPT

## 2022-06-04 PROCEDURE — 63710000001 FUROSEMIDE 40 MG TABLET: Performed by: INTERNAL MEDICINE

## 2022-06-04 RX ORDER — LOSARTAN POTASSIUM 50 MG/1
50 TABLET ORAL DAILY
Qty: 90 TABLET | Refills: 1 | Status: SHIPPED | OUTPATIENT
Start: 2022-06-04 | End: 2022-08-22 | Stop reason: SDUPTHER

## 2022-06-04 RX ADMIN — LEVOTHYROXINE SODIUM 75 MCG: 0.07 TABLET ORAL at 06:59

## 2022-06-04 RX ADMIN — FUROSEMIDE 40 MG: 40 TABLET ORAL at 09:31

## 2022-06-04 RX ADMIN — LOSARTAN POTASSIUM 25 MG: 25 TABLET, FILM COATED ORAL at 09:32

## 2022-06-04 RX ADMIN — Medication 3 ML: at 09:32

## 2022-06-04 NOTE — OUTREACH NOTE
Prep Survey    Flowsheet Row Responses   [facility] patient discharged from? [facility]   Is LACE score < 7 ? Yes   Emergency Room discharge w/ pulse ox? No   Eligibility [facility]   Date of Admission 06/03/22   Date of Discharge 06/04/22   Discharge Disposition Home or Self Care   Discharge diagnosis premature ventricular contractions, ablation   Does the patient have one of the following disease processes/diagnoses(primary or secondary)? Other   Does the patient have Home health ordered? No   Is there a DME ordered? No   Prep survey completed? Yes          STACY ORDAZ - Registered Nurse

## 2022-06-04 NOTE — DISCHARGE SUMMARY
Hillary Crenshaw  [id number]    Date of Admit: 6/3/2022  Date of Discharge:  6/4/2022    Discharge Diagnosis:  Active Hospital Problems    Diagnosis  POA   • **Premature ventricular contractions [I49.3]  Unknown      Resolved Hospital Problems   No resolved problems to display.       Hospital Course:   Hillary Crenshaw is a 76 year old woman with PVCs who was admitted for elective ablation. The procedure was successful and uncomplicated, except that a Perclose could not be placed so manual pressure was held on her groin.  She had some bruising and discomfort in her right groin, but no hemodynamic complications. She remained in NSR without PVCs. Her home diltiazem was stopped, and losartan was started for HTN.    Physical Exam:  Gen: Obese, NAD  HEENT: white conj, normal facies, no JVD  Cardio: RRR, no murmurs, both groins with mild bruising R>L, no thrill/hematoma, no edema  Pulm: CTA B  Abd: soft, protruberant, NT  Ext: No edema, 2+ DP bilat    Procedures Performed  Procedure(s):  Ablation PVC  3D MAPPING CARTO EP       Consults     No orders found for last 30 day(s).          Discharge Medications     Your medication list      START taking these medications      Instructions Last Dose Given Next Dose Due   losartan 50 MG tablet  Commonly known as: COZAAR      Take 1 tablet by mouth Daily.          CONTINUE taking these medications      Instructions Last Dose Given Next Dose Due   diclofenac 75 MG EC tablet  Commonly known as: VOLTAREN      Take 1 tablet by mouth 2 (Two) Times a Day. ONLY TAKING DAILY       fluticasone 50 MCG/ACT nasal spray  Commonly known as: FLONASE      2 sprays by Each Nare route Daily.       furosemide 40 MG tablet  Commonly known as: LASIX      Take 1 tablet by mouth Daily.       montelukast 10 MG tablet  Commonly known as: Singulair      Take 1 tablet by mouth Every Night.       Synthroid 75 MCG tablet  Generic drug: levothyroxine      Take 1 tablet by mouth Daily. TAKE ONE TABLET DAILY           STOP taking these medications    dilTIAZem  MG 24 hr capsule  Commonly known as: Cardizem CD              Where to Get Your Medications      These medications were [facility] [place] [address] [facility] [address] [place] [address] RD AT [facility] & ([facility] - [phone] [facility] [phone]   [address] [facility] [address], [facility] [address]    Phone: [phone]   · losartan 50 MG tablet         Discharge Diet: heart healthy    Activity at Discharge: as tolerated with usual groin precautions    Discharge disposition: home    Condition on Discharge: stable    Follow-up Appointments -- we will call with an EP follow up in one week   Future Appointments   Date Time Provider Department Center   8/22/2022 10:45 AM [facility] ECHO/[facility]AS RM 1 [facility] ECHO [facility]   8/22/2022 11:30 AM Rosemary Mccord APRN [facility]   2/17/2023  1:45 PM Laura Muñoz MD [facility] [id number] [facility]          Jeyson Mayberry MD  06/04/22  12:46 EDT

## 2022-06-04 NOTE — PLAN OF CARE
Goal Outcome Evaluation:  Plan of Care Reviewed With: patient, daughter        Progress: improving  Outcome Evaluation: Pt s/p PVC Ablation, off bedrest at 2100, able to tolerate clear liquids & small bites of bread. Pt ambulated to bathroom with staff assistance, denies pain. VSS, Alert & oriented, will continue to monitor

## 2022-06-06 ENCOUNTER — TRANSITIONAL CARE MANAGEMENT TELEPHONE ENCOUNTER (OUTPATIENT)
Dept: CALL CENTER | Facility: HOSPITAL | Age: 77
End: 2022-06-06

## 2022-06-06 NOTE — OUTREACH NOTE
Call Center TCM Note    Flowsheet Row Responses   [facility] patient discharged from? [facility]   Does the patient have one of the following disease processes/diagnoses(primary or secondary)? Other   TCM attempt successful? Yes   Call start time 0915   Call end time 0918   Discharge diagnosis premature ventricular contractions, ablation   Meds reviewed with patient/caregiver? Yes   Is the patient having any side effects they believe may be caused by any medication additions or changes? No   Does the patient have all medications ordered at discharge? Yes   Is the patient taking all medications as directed (includes completed medication regime)? Yes   Does the patient have a primary care provider?  Yes   Does the patient have an appointment with their PCP within 7 days of discharge? No   Comments regarding PCP DECLINED SCHEDULING PCP APPOINTMENT DURING THIS CALL.    What is preventing the patient from scheduling follow up appointments within 7 days of discharge? Haven't had time   Nursing Interventions Educated patient on importance of making appointment, Advised patient to make appointment   Has the patient kept scheduled appointments due by today? N/A   Has home health visited the patient within 72 hours of discharge? N/A   Psychosocial issues? No   Did the patient receive a copy of their discharge instructions? Yes   Nursing interventions Reviewed instructions with patient   What is the patient's perception of their health status since discharge? Improving   Is the patient/caregiver able to teach back signs and symptoms related to disease process for when to call PCP? Yes   Is the patient/caregiver able to teach back signs and symptoms related to disease process for when to call 911? Yes   Is the patient/caregiver able to teach back the hierarchy of who to call/visit for symptoms/problems? PCP, Specialist, Home health nurse, Urgent Care, ED, 911 Yes   If the patient is a current smoker, are they able to  teach back resources for cessation? Not a smoker   TCM call completed? Yes          Madai Rowland LPN    6/6/2022, 09:19 EDT

## 2022-07-08 ENCOUNTER — OFFICE VISIT (OUTPATIENT)
Dept: CARDIOLOGY | Facility: CLINIC | Age: 77
End: 2022-07-08

## 2022-07-08 VITALS
HEIGHT: 64 IN | BODY MASS INDEX: 33.12 KG/M2 | WEIGHT: 194 LBS | HEART RATE: 64 BPM | SYSTOLIC BLOOD PRESSURE: 130 MMHG | DIASTOLIC BLOOD PRESSURE: 78 MMHG

## 2022-07-08 DIAGNOSIS — I49.3 PREMATURE VENTRICULAR CONTRACTIONS: Primary | ICD-10-CM

## 2022-07-08 PROCEDURE — 99213 OFFICE O/P EST LOW 20 MIN: CPT

## 2022-07-08 NOTE — PROGRESS NOTES
Date of Office Visit: 2022  Encounter Provider: JUSTIN Mora  Place of Service: [facility] CARDIOLOGY  Patient Name: Hillary Crenshaw  :1945    Chief Complaint   Patient presents with   • PVCs     1 month BHE f/u    :     HPI: Hillary Crenshaw is a 76 y.o. female who is a patient of Dr. Mayberry and Dr. De La Torre.  She has a history of PACs, PVCs, htn, and hypothyroidism.      She was first seen by Dr. De La Torre on 2021.  At that time she was having frequent PVCs.  She was first diagnosed with them almost 30 years ago.  They seemed to have gotten worse over the past several years.  She was also having symptoms of dizziness and one episode of syncope.  Dr. De La Torre discussed ablation with her and she elected to proceed.  The procedure was scheduled for 2021 but she was nervous and elected to cancel and ended up not following up.     Over the past few months she has been discussing things with Dr. Mayberry.  She has been having increased palpitations and her diltiazem was increased to 360mg daily.  She continues to have episodes of palpitations and dizziness despite medical therapy.  We have been asked to see her again and discuss ablation further.     I saw her on 2022.  At that time she was complaining of increased episodes of palpitations.  These were occurring more frequently despite an increase in her diltiazem.  She also had associated shortness of breath.  Holter monitor in  noted PVC burden to be 50%, EF was 53%.  She was on diltiazem 360mg daily.  Dr. De La Torre offered her ablation for control of her PVCS.     She underwent PVC ablation, #1 was in the RVOT which was successfully ablated, #2 was located on the RV septum and successfully ablated.  A per close was attempted in the right groin but failed and she did have some bleeding from her groin site and was monitored overnight.  She was discharged the next day in stable condition. Diltiazem was  stopped at discharge.                       She presents today for follow up appt.     She says that she is doing great and feels much better.     Patient says that she is able to sleep better and feels like she is getting more rest.     She continues to have occasional PVCs but nothing like before the ablation. They are much more manageable.     In the past she would have shortness of breath and fatigue associated with her PVCs.  She has not have any of this since the procedure.     She denies any chest pain, dyspnea, fatigue, or syncope.     Currently in NSR, no PVCs today on EKG.     She wore a monitor last year which showed a burden of 50%.             Past Medical History:   Diagnosis Date   • Abnormal stool test    • Acute allergic rhinitis    • Acute sinusitis    • Allergic rhinitis    • Ankle pain    • Arthritis of left hip    • Atherosclerosis of both carotid arteries     2021: right 16-49%, left with plaque   • Atrial ectopy    • Boil, thigh    • Chronic constipation    • Gastritis    • GERD (gastroesophageal reflux disease)    • Grade I diastolic dysfunction 06/01/2021   • Head ache    • Hyperlipidemia    • Hypertension    • Hypervitaminosis D    • Hypothyroidism    • Lung nodule    • Osteoarthritis     left hip   • Osteopenia    • Pessary maintenance    • Post-menopause    • Premature ventricular contractions    • Prolapse of female bladder, acquired    • PSVT (paroxysmal supraventricular tachycardia) (HCC)    • Pulmonary hypertension (HCC)     Severe per echo June 2021   • Rectal bleeding    • Right foot pain    • Urine frequency    • UTI (urinary tract infection)        Past Surgical History:   Procedure Laterality Date   • CARDIAC ELECTROPHYSIOLOGY PROCEDURE N/A 6/3/2022    Procedure: Ablation PVC;  Surgeon: Jasbir De La Torre MD;  Location: [facility] INVASIVE LOCATION;  Service: Cardiovascular;  Laterality: N/A;   • FOOT SURGERY     • TOTAL HIP ARTHROPLASTY         Social History      Socioeconomic History   • Marital status:    Tobacco Use   • Smoking status: Former Smoker     Quit date:      Years since quittin.5   • Smokeless tobacco: Never Used   Vaping Use   • Vaping Use: Never used   Substance and Sexual Activity   • Alcohol use: Yes     Alcohol/week: 1.0 standard drink     Types: 1 Glasses of wine per week     Comment: 7 drinks week/// Caffeine use: 2-3 cups daily   • Drug use: No   • Sexual activity: Defer       Family History   Problem Relation Age of Onset   • Breast cancer Mother    • Heart attack Father    • Alcohol abuse Sister    • Arthritis Sister    • Diabetes Sister    • Hyperlipidemia Sister    • Osteoporosis Sister    • Thyroid disease Sister    • Alcohol abuse Brother    • Hypertension Brother    • Hyperlipidemia Brother    • Lung cancer Brother    • Heart attack Brother    • Bipolar disorder Maternal Grandmother    • Breast cancer Maternal Grandmother    • Depression Maternal Grandmother    • Other Other         cardiac disorder, neoplasm of breast   • No Known Problems Maternal Grandfather    • No Known Problems Paternal Grandmother    • No Known Problems Paternal Grandfather        Review of Systems   Constitutional: Negative for chills, fever and malaise/fatigue.   Cardiovascular: Negative for chest pain, dyspnea on exertion, leg swelling, near-syncope, orthopnea, palpitations, paroxysmal nocturnal dyspnea and syncope.   Respiratory: Negative for cough and shortness of breath.    Hematologic/Lymphatic: Negative.    Musculoskeletal: Negative for joint pain, joint swelling and myalgias.   Gastrointestinal: Negative for abdominal pain, diarrhea, melena, nausea and vomiting.   Genitourinary: Negative for frequency and hematuria.   Neurological: Negative for light-headedness, numbness, paresthesias and seizures.   Allergic/Immunologic: Negative.    All other systems reviewed and are negative.      No Known Allergies      Current Outpatient Medications:   •   "furosemide (LASIX) 40 MG tablet, Take 1 tablet by mouth Daily., Disp: 90 tablet, Rfl: 3  •  losartan (COZAAR) 50 MG tablet, Take 1 tablet by mouth Daily., Disp: 90 tablet, Rfl: 1  •  montelukast (Singulair) 10 MG tablet, Take 1 tablet by mouth Every Night., Disp: 90 tablet, Rfl: 3  •  Synthroid 75 MCG tablet, Take 1 tablet by mouth Daily. TAKE ONE TABLET DAILY, Disp: 90 tablet, Rfl: 3      Objective:     Vitals:    07/08/22 1418   BP: 130/78   Pulse: 64   Weight: 88 kg (194 lb)   Height: 162.6 cm (64\")     Body mass index is 33.3 kg/m².    PHYSICAL EXAM:    Vitals Reviewed.   General Appearance: No acute distress, well developed and well nourished.   Eyes: Conjunctiva and lids: No erythema, swelling, or discharge. Sclera non-icteric.   HENT: Atraumatic, normocephalic. External eyes, ears, and nose normal.   Respiratory: No signs of respiratory distress. Respiration rhythm and depth normal.   Clear to auscultation. No rales, crackles, rhonchi, or wheezing auscultated.   Cardiovascular:  Heart Rate and Rhythm: Normal, Heart Sounds: Normal S1 and S2. No S3 or S4 noted.  Gastrointestinal:  Abdomen soft, non-distended, non-tender.   Musculoskeletal: Normal movement of extremities  Skin: Warm and dry.   Psychiatric: Patient alert and oriented to person, place, and time. Speech and behavior appropriate. Normal mood and affect.       ECG 12 Lead    Date/Time: 7/11/2022 8:49 AM  Performed by: Tristen Brooks APRN  Authorized by: Tristen Brooks APRN   Comparison: compared with previous ECG   Similar to previous ECG  Rhythm: sinus rhythm  BPM: 58                Assessment:       Diagnosis Plan   1. Premature ventricular contractions  Holter Monitor - 48 Hour          Plan:   1. PVCs--s/p PVC ablation (Dr. De La Torre-6/03/2022)- she has done great since her ablation last month.  Significant reduction in overall PVCs and symptoms.  She has no PVCs on EKG in office today.       I will repeat a monitor to assess her burden post " ablation.        She will follow up in two months or sooner pending monitor.         As always, it has been a pleasure to participate in your patient's care.      Sincerely,         JUSTIN Mora

## 2022-07-11 PROCEDURE — 93000 ELECTROCARDIOGRAM COMPLETE: CPT

## 2022-08-10 ENCOUNTER — TELEPHONE (OUTPATIENT)
Dept: FAMILY MEDICINE CLINIC | Facility: CLINIC | Age: 77
End: 2022-08-10

## 2022-08-10 NOTE — TELEPHONE ENCOUNTER
Caller: Hillary Crenshaw    Relationship to patient: Self    Best call back number: [phone]    Date of positive COVID19 test: HAS NOT TESTED, STATED SHE DIDN'T WANT TO SINCE  IS POSITIVE     Date if possible COVID19 exposure: PT'S  TESTED POSITIVE LAST WEEK     COVID19 symptoms: FEVER, BODY ACHES, CONGESTION     What is the patients preferred pharmacy: [facility] [place] [address] RD AT [facility] & [facility] [phone] [facility] [phone] FX

## 2022-08-11 NOTE — TELEPHONE ENCOUNTER
Spoke with patient and she stated that she has not tested but she assumes she is positive because her  was positive.  I reviewed with her the quarantine guidelines and she stated that she will call us back if she needs anything else.  She is just treating the symptoms for now.

## 2022-08-22 ENCOUNTER — OFFICE VISIT (OUTPATIENT)
Dept: CARDIOLOGY | Facility: CLINIC | Age: 77
End: 2022-08-22

## 2022-08-22 ENCOUNTER — HOSPITAL ENCOUNTER (OUTPATIENT)
Dept: CARDIOLOGY | Facility: HOSPITAL | Age: 77
Discharge: HOME | End: 2022-08-22
Admitting: INTERNAL MEDICINE

## 2022-08-22 VITALS
HEIGHT: 64 IN | HEART RATE: 58 BPM | WEIGHT: 192 LBS | BODY MASS INDEX: 32.78 KG/M2 | SYSTOLIC BLOOD PRESSURE: 130 MMHG | DIASTOLIC BLOOD PRESSURE: 78 MMHG

## 2022-08-22 VITALS
HEART RATE: 70 BPM | WEIGHT: 194 LBS | HEIGHT: 64 IN | DIASTOLIC BLOOD PRESSURE: 74 MMHG | SYSTOLIC BLOOD PRESSURE: 136 MMHG | BODY MASS INDEX: 33.12 KG/M2

## 2022-08-22 DIAGNOSIS — G47.33 OBSTRUCTIVE SLEEP APNEA: ICD-10-CM

## 2022-08-22 DIAGNOSIS — I34.0 NONRHEUMATIC MITRAL VALVE REGURGITATION: ICD-10-CM

## 2022-08-22 DIAGNOSIS — I49.3 PREMATURE VENTRICULAR CONTRACTIONS: Primary | ICD-10-CM

## 2022-08-22 DIAGNOSIS — I49.3 PREMATURE VENTRICULAR CONTRACTIONS: ICD-10-CM

## 2022-08-22 DIAGNOSIS — I36.1 NONRHEUMATIC TRICUSPID VALVE REGURGITATION: ICD-10-CM

## 2022-08-22 DIAGNOSIS — I10 ESSENTIAL HYPERTENSION: ICD-10-CM

## 2022-08-22 DIAGNOSIS — I27.20 PULMONARY HYPERTENSION: ICD-10-CM

## 2022-08-22 DIAGNOSIS — I51.89 GRADE I DIASTOLIC DYSFUNCTION: ICD-10-CM

## 2022-08-22 LAB
BH CV ECHO MEAS - ACS: 2.24 CM
BH CV ECHO MEAS - AI P1/2T: 1208 MSEC
BH CV ECHO MEAS - AO MAX PG: 8.6 MMHG
BH CV ECHO MEAS - AO MEAN PG: 4.3 MMHG
BH CV ECHO MEAS - AO ROOT DIAM: 3.5 CM
BH CV ECHO MEAS - AO V2 MAX: 146.4 CM/SEC
BH CV ECHO MEAS - AO V2 VTI: 29.5 CM
BH CV ECHO MEAS - AVA(I,D): 2.6 CM2
BH CV ECHO MEAS - EDV(CUBED): 93.9 ML
BH CV ECHO MEAS - EDV(MOD-SP2): 65 ML
BH CV ECHO MEAS - EDV(MOD-SP4): 83 ML
BH CV ECHO MEAS - EF(MOD-BP): 63.6 %
BH CV ECHO MEAS - EF(MOD-SP2): 58.5 %
BH CV ECHO MEAS - EF(MOD-SP4): 65.1 %
BH CV ECHO MEAS - EF_3D-VOL: 60 %
BH CV ECHO MEAS - ESV(CUBED): 11.7 ML
BH CV ECHO MEAS - ESV(MOD-SP2): 27 ML
BH CV ECHO MEAS - ESV(MOD-SP4): 29 ML
BH CV ECHO MEAS - FS: 50.1 %
BH CV ECHO MEAS - IVS/LVPW: 0.93 CM
BH CV ECHO MEAS - IVSD: 0.88 CM
BH CV ECHO MEAS - LA 3D VOL INDEX: 26
BH CV ECHO MEAS - LAT PEAK E' VEL: 7.5 CM/SEC
BH CV ECHO MEAS - LV DIASTOLIC VOL/BSA (35-75): 43 CM2
BH CV ECHO MEAS - LV MASS(C)D: 138.7 GRAMS
BH CV ECHO MEAS - LV MAX PG: 5 MMHG
BH CV ECHO MEAS - LV MEAN PG: 2.8 MMHG
BH CV ECHO MEAS - LV SYSTOLIC VOL/BSA (12-30): 15 CM2
BH CV ECHO MEAS - LV V1 MAX: 111.8 CM/SEC
BH CV ECHO MEAS - LV V1 VTI: 25.1 CM
BH CV ECHO MEAS - LVIDD: 4.5 CM
BH CV ECHO MEAS - LVIDS: 2.27 CM
BH CV ECHO MEAS - LVOT AREA: 3.1 CM2
BH CV ECHO MEAS - LVOT DIAM: 1.97 CM
BH CV ECHO MEAS - LVPWD: 0.95 CM
BH CV ECHO MEAS - MED PEAK E' VEL: 6.5 CM/SEC
BH CV ECHO MEAS - MR MAX PG: 63.8 MMHG
BH CV ECHO MEAS - MR MAX VEL: 399.3 CM/SEC
BH CV ECHO MEAS - MV A DUR: 0.17 SEC
BH CV ECHO MEAS - MV A MAX VEL: 102.4 CM/SEC
BH CV ECHO MEAS - MV DEC SLOPE: 377.6 CM/SEC2
BH CV ECHO MEAS - MV DEC TIME: 0.21 MSEC
BH CV ECHO MEAS - MV E MAX VEL: 67.3 CM/SEC
BH CV ECHO MEAS - MV E/A: 0.66
BH CV ECHO MEAS - MV MAX PG: 4.1 MMHG
BH CV ECHO MEAS - MV MEAN PG: 1.05 MMHG
BH CV ECHO MEAS - MV P1/2T: 53.2 MSEC
BH CV ECHO MEAS - MV V2 VTI: 27.2 CM
BH CV ECHO MEAS - MVA(P1/2T): 4.1 CM2
BH CV ECHO MEAS - MVA(VTI): 2.8 CM2
BH CV ECHO MEAS - PA ACC TIME: 0.1 SEC
BH CV ECHO MEAS - PA PR(ACCEL): 33.1 MMHG
BH CV ECHO MEAS - PA V2 MAX: 97.7 CM/SEC
BH CV ECHO MEAS - PULM A REVS DUR: 0.14 SEC
BH CV ECHO MEAS - PULM A REVS VEL: 31.4 CM/SEC
BH CV ECHO MEAS - PULM DIAS VEL: 24.8 CM/SEC
BH CV ECHO MEAS - PULM S/D: 1.28
BH CV ECHO MEAS - PULM SYS VEL: 31.8 CM/SEC
BH CV ECHO MEAS - QP/QS: 0.58
BH CV ECHO MEAS - RAP SYSTOLE: 3 MMHG
BH CV ECHO MEAS - RV MAX PG: 1.88 MMHG
BH CV ECHO MEAS - RV V1 MAX: 68.6 CM/SEC
BH CV ECHO MEAS - RV V1 VTI: 14.4 CM
BH CV ECHO MEAS - RVOT DIAM: 1.99 CM
BH CV ECHO MEAS - RVSP: 34 MMHG
BH CV ECHO MEAS - SI(MOD-SP2): 19.7 ML/M2
BH CV ECHO MEAS - SI(MOD-SP4): 28 ML/M2
BH CV ECHO MEAS - SV(LVOT): 76.8 ML
BH CV ECHO MEAS - SV(MOD-SP2): 38 ML
BH CV ECHO MEAS - SV(MOD-SP4): 54 ML
BH CV ECHO MEAS - SV(RVOT): 44.7 ML
BH CV ECHO MEAS - TAPSE (>1.6): 1.88 CM
BH CV ECHO MEAS - TR MAX PG: 31.4 MMHG
BH CV ECHO MEAS - TR MAX VEL: 280 CM/SEC
BH CV ECHO MEASUREMENTS AVERAGE E/E' RATIO: 9.61
BH CV XLRA - RV BASE: 3.4 CM
BH CV XLRA - RV LENGTH: 6.6 CM
BH CV XLRA - RV MID: 2.07 CM
BH CV XLRA - TDI S': 10.9 CM/SEC
LEFT ATRIUM VOLUME INDEX: 31.1 ML/M2
MAXIMAL PREDICTED HEART RATE: 143 BPM
SINUS: 2.8 CM
STJ: 3.1 CM
STRESS TARGET HR: 122 BPM

## 2022-08-22 PROCEDURE — 93306 TTE W/DOPPLER COMPLETE: CPT

## 2022-08-22 PROCEDURE — 93306 TTE W/DOPPLER COMPLETE: CPT | Performed by: INTERNAL MEDICINE

## 2022-08-22 PROCEDURE — 99214 OFFICE O/P EST MOD 30 MIN: CPT | Performed by: NURSE PRACTITIONER

## 2022-08-22 PROCEDURE — 93000 ELECTROCARDIOGRAM COMPLETE: CPT | Performed by: NURSE PRACTITIONER

## 2022-08-22 RX ORDER — LOSARTAN POTASSIUM 50 MG/1
50 TABLET ORAL DAILY
Qty: 90 TABLET | Refills: 3 | Status: SHIPPED | OUTPATIENT
Start: 2022-08-22 | End: 2023-03-15 | Stop reason: SDUPTHER

## 2022-08-22 NOTE — PROGRESS NOTES
Date of Office Visit: 2022  Encounter Provider: JUSTIN Anderson  Place of Service: [facility] CARDIOLOGY  Patient Name: Hillary Crenshaw  :1945  Primary Cardiologist: Dr. Jeyson Mayberry     Chief Complaint   Patient presents with   • Follow-up   • PVC   :     HPI: Hillary Crenshaw is a pleasant 77 y.o. female who presents today for follow-up on PVCs.  I have reviewed her medical records.    In 2016, she had an episode of syncope which was her second episode. The episodes were preceded by abrupt onset, extremely rapid heartbeat.  Holter monitor showed normal sinus rhythm with a 22% PVC burden and occasional short bursts of PACs.  There was concern that she had atrial fibrillation, but Dr. Mayberry said this was never actually diagnosed and felt that it was more SVT.  She was placed on diltiazem.    In , 2D echocardiogram normal LVEF, grade 1 diastolic dysfunction, moderate tricuspid regurgitation, and moderate pulmonary hypertension.  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  She was referred to EP and they recommended a PVC ablation. She decided to hold off on the procedure.  She has declined a sleep apnea evaluation.    In , she had mild carotid atherosclerosis on the right Dr. Mayberry recommended repeating the carotid study in .    In 2022, she saw JUSTIN Mora for follow-up after PVC ablation in the RVOT and RV septum, both successfully ablated.  Diltiazem was stopped at discharge.  She was feeling great at that time and was recommended a repeat Holter monitor to assess PVCs.  Holter monitor showed normal sinus rhythm, average heart rate 61, and rare APCs/PVCs.    She presents today for follow-up visit.  Echocardiogram was just completed and the results are pending.  She is feeling much better since her PVC ablation.  Occasionally she will experience a brief palpitation, but nothing sustained.  She denies chest pain, shortness of air,  PND, orthopnea, edema, dizziness, syncope, or bleeding.  She has been diagnosed with obstructive sleep apnea and was unable to tolerate the machine.  Blood pressure and heart rate are normal today.      Past Medical History:   Diagnosis Date   • Abnormal stool test    • Acute allergic rhinitis    • Acute sinusitis    • Allergic rhinitis    • Ankle pain    • Arthritis of left hip    • Atherosclerosis of both carotid arteries     : right 16-49%, left with plaque   • Atrial ectopy    • Boil, thigh    • Chronic constipation    • Gastritis    • GERD (gastroesophageal reflux disease)    • Grade I diastolic dysfunction 2021   • Head ache    • Hyperlipidemia    • Hypertension    • Hypervitaminosis D    • Hypothyroidism    • Lung nodule    • TARI (obstructive sleep apnea)     unable to tolerate CPAP   • Osteoarthritis     left hip   • Osteopenia    • Pessary maintenance    • Post-menopause    • Premature ventricular contractions    • Prolapse of female bladder, acquired    • PSVT (paroxysmal supraventricular tachycardia) (HCC)    • Pulmonary hypertension (HCC)     Severe per echo 2021   • Rectal bleeding    • Right foot pain    • Urine frequency    • UTI (urinary tract infection)        Past Surgical History:   Procedure Laterality Date   • CARDIAC ELECTROPHYSIOLOGY PROCEDURE N/A 6/3/2022    Procedure: Ablation PVC;  Surgeon: Jasbir De La Torre MD;  Location: [facility] INVASIVE LOCATION;  Service: Cardiovascular;  Laterality: N/A;   • FOOT SURGERY     • TOTAL HIP ARTHROPLASTY         Social History     Socioeconomic History   • Marital status:    Tobacco Use   • Smoking status: Former Smoker     Quit date:      Years since quittin.6   • Smokeless tobacco: Never Used   Vaping Use   • Vaping Use: Never used   Substance and Sexual Activity   • Alcohol use: Yes     Alcohol/week: 1.0 standard drink     Types: 1 Glasses of wine per week     Comment: 7 drinks week/// Caffeine use: 2-3 cups daily  "  • Drug use: No   • Sexual activity: Defer       Family History   Problem Relation Age of Onset   • Breast cancer Mother    • Heart attack Father    • Alcohol abuse Sister    • Arthritis Sister    • Diabetes Sister    • Hyperlipidemia Sister    • Osteoporosis Sister    • Thyroid disease Sister    • Alcohol abuse Brother    • Hypertension Brother    • Hyperlipidemia Brother    • Lung cancer Brother    • Heart attack Brother    • Bipolar disorder Maternal Grandmother    • Breast cancer Maternal Grandmother    • Depression Maternal Grandmother    • Other Other         cardiac disorder, neoplasm of breast   • No Known Problems Maternal Grandfather    • No Known Problems Paternal Grandmother    • No Known Problems Paternal Grandfather        The following portion of the patient's history were reviewed and updated as appropriate: past medical history, past surgical history, past social history, past family history, allergies, current medications, and problem list.    Review of Systems   Constitutional: Negative.   Cardiovascular: Positive for palpitations.   Respiratory: Negative.    Hematologic/Lymphatic: Negative.    Neurological: Negative.        No Known Allergies      Current Outpatient Medications:   •  furosemide (LASIX) 40 MG tablet, Take 1 tablet by mouth Daily., Disp: 90 tablet, Rfl: 3  •  losartan (COZAAR) 50 MG tablet, Take 1 tablet by mouth Daily., Disp: 90 tablet, Rfl: 1  •  montelukast (Singulair) 10 MG tablet, Take 1 tablet by mouth Every Night., Disp: 90 tablet, Rfl: 3  •  Synthroid 75 MCG tablet, Take 1 tablet by mouth Daily. TAKE ONE TABLET DAILY, Disp: 90 tablet, Rfl: 3        Objective:     Vitals:    08/22/22 1143   BP: 130/78   BP Location: Left arm   Patient Position: Sitting   Cuff Size: Adult   Pulse: 58   Weight: 87.1 kg (192 lb)   Height: 162.6 cm (64\")     Body mass index is 32.96 kg/m².    PHYSICAL EXAM:    Vitals Reviewed.   General Appearance: No acute distress, well developed and well " nourished. Obese.   Eyes: Conjunctiva and lids: No erythema, swelling, or discharge. Sclera non-icteric. Glasses.   HENT: Atraumatic, normocephalic. External eyes, ears, and nose normal. No hearing loss noted. Mucous membranes normal. Lips not cyanotic. Neck supple with no tenderness. Wearing mask.   Respiratory: No signs of respiratory distress. Respiration rhythm and depth normal.   Clear to auscultation. No rales, crackles, rhonchi, or wheezing auscultated.   Cardiovascular:  Jugular Venous Pressure: Normal  Heart Rate and Rhythm: Bradycardic., Heart Sounds: Normal S1 and S2. No S3 or S4 noted.  Murmurs: No murmurs noted. No rubs, thrills, or gallops.   Lower Extremities: No edema noted.  Gastrointestinal:  Abdomen soft, non-distended, non-tender.    Musculoskeletal: Normal movement of extremities.  Skin and Nails: General appearance normal. No pallor, cyanosis, diaphoresis. Skin temperature normal. No clubbing of fingernails.   Psychiatric: Patient alert and oriented to person, place, and time. Speech and behavior appropriate. Normal mood and affect.       ECG 12 Lead    Date/Time: 8/22/2022 11:49 AM  Performed by: Rosemary Mccord APRN  Authorized by: Rosemary Mccord APRN   Comparison: compared with previous ECG from 7/8/2022  Similar to previous ECG  Rhythm: sinus bradycardia  Rate: normal  BPM: 58  Conduction: conduction normal  ST Segments: ST segments normal  T Waves: T waves normal  QRS axis: normal    Clinical impression: normal ECG              Assessment:       Diagnosis Plan   1. Premature ventricular contractions     2. Nonrheumatic tricuspid valve regurgitation     3. Pulmonary hypertension (HCC)     4. Nonrheumatic mitral valve regurgitation     5. Grade I diastolic dysfunction     6. Essential hypertension     7. Obstructive sleep apnea            Plan:       1.  PVCs: Status post ablation by Dr. Jasbir De La Torre in June 2022.  PVC burden has significantly decreased.  She feels better off  diltiazem.  She reports an occasional brief palpitation, but nothing sustained.  She is being followed by her EP team.    2.  Tricuspid Regurgitation: Moderate per echocardiogram in June 2021.  Echo pending.    3.  Pulmonary Hypertension: Moderate per echocardiogram in June 2021.  Echo pending.    4.  Mild mitral regurgitation noted per echocardiogram June 21.    5.  Grade 1 diastolic dysfunction: Euvolemic today.    6.  Hypertension: Blood pressure normal.  I refilled her losartan.    7.  Obstructive Sleep Apnea: Unable to tolerate CPAP in the past.    8.  If echocardiogram is stable, I recommended a 1 year follow-up visit with Dr. Mayberry.    As always, it has been a pleasure to participate in your patient's care. Thank you.       Sincerely,         JUSTIN Graham  [facility]      · Dictated utilizing Dragon Dictation  · COVID-19 Precautions - Patient was compliant in wearing a mask. When I saw the patient, I used appropriate personal protective equipment (PPE) including mask and eye shield (standard procedure).  Additionally, I used gown and gloves if indicated.  Hand hygiene was completed before and after seeing the patient.  · I spent 30 minutes reviewing her medical records/testing/previous office notes/labs, face-to-face interaction with patient, physical examination, formulating the plan of care, and discussion of plan of care with patient.

## 2022-08-23 ENCOUNTER — TELEPHONE (OUTPATIENT)
Dept: CARDIOLOGY | Facility: CLINIC | Age: 77
End: 2022-08-23

## 2022-08-24 NOTE — TELEPHONE ENCOUNTER
Echo Results reviewed and Dr. Mayberry said echo was stable.     · EF normal  · Grade I diastolic dysfunction (unchanged)  · Mild to moderate tricuspid regurgitation (improved from 2021)  · Trace mitral regurgitation (improved from 2021)  · No pulmonary hypertension noted (moderate per echo in 2021)    Please call patient.  Continue with current medications.  Follow-up with Dr. Mayberry in 1 year.  Thank you

## 2022-08-25 NOTE — TELEPHONE ENCOUNTER
Notified Hillary Crenshaw of results and recommendations. Patient verbalized understanding and she has a follow up scheduled with Dr. Mayberry in August 2023.    Rubia Hurd RN  [facility] Triage  08/25/22  12:47 EDT

## 2022-10-01 DIAGNOSIS — M16.12 ARTHRITIS OF LEFT HIP: ICD-10-CM

## 2022-10-06 NOTE — TELEPHONE ENCOUNTER
Caller: Hillary Crenshaw    Relationship to patient: Self    Best call back number: [phone]    Patient is needing: PATIENT STATES THAT SHE HAS ABOUT A WEEK OF THIS MEDICATION LEFT. PLEASE SEND.

## 2022-10-17 ENCOUNTER — OFFICE VISIT (OUTPATIENT)
Dept: CARDIOLOGY | Facility: CLINIC | Age: 77
End: 2022-10-17

## 2022-10-17 VITALS
WEIGHT: 190 LBS | BODY MASS INDEX: 32.44 KG/M2 | SYSTOLIC BLOOD PRESSURE: 132 MMHG | DIASTOLIC BLOOD PRESSURE: 84 MMHG | HEIGHT: 64 IN | HEART RATE: 64 BPM

## 2022-10-17 DIAGNOSIS — I49.3 PREMATURE VENTRICULAR CONTRACTIONS: Primary | ICD-10-CM

## 2022-10-17 DIAGNOSIS — I10 ESSENTIAL HYPERTENSION: ICD-10-CM

## 2022-10-17 PROCEDURE — 93000 ELECTROCARDIOGRAM COMPLETE: CPT

## 2022-10-17 PROCEDURE — 99213 OFFICE O/P EST LOW 20 MIN: CPT

## 2022-10-17 RX ORDER — DICLOFENAC SODIUM 75 MG/1
TABLET, DELAYED RELEASE ORAL
Qty: 180 TABLET | Refills: 2 | Status: SHIPPED | OUTPATIENT
Start: 2022-10-17 | End: 2023-03-15 | Stop reason: SDUPTHER

## 2022-10-17 NOTE — TELEPHONE ENCOUNTER
Patient called back to check the status of this prescription refill.  She is completely out of medication and a little upset since she hasn't heard anything and she initially requested the mediation over two weeks ago.  Her phone number is [phone].

## 2022-10-17 NOTE — PROGRESS NOTES
Date of Office Visit: 10/17/2022  Encounter Provider: JUSTIN Mora  Place of Service: [facility] CARDIOLOGY  Patient Name: Hillary Crenshaw  :1945    Chief Complaint   Patient presents with   • PVCs     3 month f/u   :     HPI: Hillary Crenshaw is a 77 y.o. female who is a patient of Dr. Mayberry and Dr. De La Torre.  She has a history of PACs, PVCs, htn, and hypothyroidism.      She was first seen by Dr. De La Torre on 2021.  At that time she was having frequent PVCs.  She was first diagnosed with them almost 30 years ago.  They seemed to have gotten worse over the past several years.  She was also having symptoms of dizziness and one episode of syncope.  Dr. De La Torre discussed ablation with her and she elected to proceed.  The procedure was scheduled for 2021 but she was nervous and elected to cancel and ended up not following up.     Over the past few months she has been discussing things with Dr. Mayberry.  She has been having increased palpitations and her diltiazem was increased to 360mg daily.  She continues to have episodes of palpitations and dizziness despite medical therapy.  We have been asked to see her again and discuss ablation further.      I saw her on 2022.  At that time she was complaining of increased episodes of palpitations.  These were occurring more frequently despite an increase in her diltiazem.  She also had associated shortness of breath.  Holter monitor in  noted PVC burden to be 50%, EF was 53%.  She was on diltiazem 360mg daily.  Dr. De La Torre offered her ablation for control of her PVCS.      She underwent PVC ablation, #1 was in the RVOT which was successfully ablated, #2 was located on the RV septum and successfully ablated.  A per close was attempted in the right groin but failed and she did have some bleeding from her groin site and was monitored overnight.  She was discharged the next day in stable condition. Diltiazem was stopped  "at discharge.       I saw her on 7/11/2022 for her one month appt.  She was doing much better at that time. She was able to sleep better and palpitations had improved.  She wore a monitor which showed less than 1% burden of PVCs---monitor last year showed PVC burden of 50%. Echo in August showed normal LVEF of 60%.                                         She presents today for follow up appt.     She continues to do very well post ablation. Says that she is \"100%\" better.     Patient says she is able to rest better and just overall feels better. She does not feel as anxious.     Continues to have occasional palpitations but much better since ablation.      She denies any chest pain, dyspnea, edema, syncope, or dizziness.     EKG today shows NSR. No PVCs.     She had 50% PVC burden before ablation and less than 1% after.     She is not on any AV adam blockers.     Had echo in August which showed normal LVEF of 60%.           Past Medical History:   Diagnosis Date   • Abnormal stool test    • Acute allergic rhinitis    • Acute sinusitis    • Allergic rhinitis    • Ankle pain    • Arthritis of left hip    • Atherosclerosis of both carotid arteries     2021: right 16-49%, left with plaque   • Atrial ectopy    • Boil, thigh    • Chronic constipation    • Gastritis    • GERD (gastroesophageal reflux disease)    • Grade I diastolic dysfunction 06/01/2021   • Head ache    • Hyperlipidemia    • Hypertension    • Hypervitaminosis D    • Hypothyroidism    • Lung nodule    • TARI (obstructive sleep apnea)     unable to tolerate CPAP   • Osteoarthritis     left hip   • Osteopenia    • Pessary maintenance    • Post-menopause    • Premature ventricular contractions    • Prolapse of female bladder, acquired    • PSVT (paroxysmal supraventricular tachycardia) (HCC)    • Pulmonary hypertension (HCC)     Severe per echo June 2021   • Rectal bleeding    • Right foot pain    • Urine frequency    • UTI (urinary tract infection)  "       Past Surgical History:   Procedure Laterality Date   • CARDIAC ELECTROPHYSIOLOGY PROCEDURE N/A 6/3/2022    Procedure: Ablation PVC;  Surgeon: Jasbir De La Torre MD;  Location: [facility] INVASIVE LOCATION;  Service: Cardiovascular;  Laterality: N/A;   • FOOT SURGERY     • TOTAL HIP ARTHROPLASTY         Social History     Socioeconomic History   • Marital status:    Tobacco Use   • Smoking status: Former     Types: Cigarettes     Quit date:      Years since quittin.8   • Smokeless tobacco: Never   Vaping Use   • Vaping Use: Never used   Substance and Sexual Activity   • Alcohol use: Yes     Alcohol/week: 1.0 standard drink     Types: 1 Glasses of wine per week     Comment: 7 drinks week/// Caffeine use: 2-3 cups daily   • Drug use: No   • Sexual activity: Defer       Family History   Problem Relation Age of Onset   • Breast cancer Mother    • Heart attack Father    • Alcohol abuse Sister    • Arthritis Sister    • Diabetes Sister    • Hyperlipidemia Sister    • Osteoporosis Sister    • Thyroid disease Sister    • Alcohol abuse Brother    • Hypertension Brother    • Hyperlipidemia Brother    • Lung cancer Brother    • Heart attack Brother    • Bipolar disorder Maternal Grandmother    • Breast cancer Maternal Grandmother    • Depression Maternal Grandmother    • Other Other         cardiac disorder, neoplasm of breast   • No Known Problems Maternal Grandfather    • No Known Problems Paternal Grandmother    • No Known Problems Paternal Grandfather        Review of Systems   Constitutional: Negative for chills, fever and malaise/fatigue.   Cardiovascular: Negative for chest pain, dyspnea on exertion, leg swelling, near-syncope, orthopnea, palpitations, paroxysmal nocturnal dyspnea and syncope.   Respiratory: Negative for cough and shortness of breath.    Hematologic/Lymphatic: Negative.    Musculoskeletal: Negative for joint pain, joint swelling and myalgias.   Gastrointestinal: Negative for  "abdominal pain, diarrhea, melena, nausea and vomiting.   Genitourinary: Negative for frequency and hematuria.   Neurological: Negative for light-headedness, numbness, paresthesias and seizures.   Allergic/Immunologic: Negative.    All other systems reviewed and are negative.      No Known Allergies      Current Outpatient Medications:   •  diclofenac (VOLTAREN) 75 MG EC tablet, TAKE ONE TABLET BY MOUTH TWICE A DAY, Disp: 180 tablet, Rfl: 2  •  furosemide (LASIX) 40 MG tablet, Take 1 tablet by mouth Daily., Disp: 90 tablet, Rfl: 3  •  losartan (COZAAR) 50 MG tablet, Take 1 tablet by mouth Daily., Disp: 90 tablet, Rfl: 3  •  montelukast (Singulair) 10 MG tablet, Take 1 tablet by mouth Every Night., Disp: 90 tablet, Rfl: 3  •  Synthroid 75 MCG tablet, Take 1 tablet by mouth Daily. TAKE ONE TABLET DAILY, Disp: 90 tablet, Rfl: 3      Objective:     Vitals:    10/17/22 1129   BP: 132/84   Pulse: 64   Weight: 86.2 kg (190 lb)   Height: 162.6 cm (64\")     Body mass index is 32.61 kg/m².    PHYSICAL EXAM:    Vitals Reviewed.   General Appearance: No acute distress, well developed and well nourished.   Eyes: Conjunctiva and lids: No erythema, swelling, or discharge. Sclera non-icteric.   HENT: Atraumatic, normocephalic. External eyes, ears, and nose normal.   Respiratory: No signs of respiratory distress. Respiration rhythm and depth normal.   Clear to auscultation. No rales, crackles, rhonchi, or wheezing auscultated.   Cardiovascular:  Heart Rate and Rhythm: Normal, Heart Sounds: Normal S1 and S2. No S3 or S4 noted.  Gastrointestinal:  Abdomen soft, non-distended, non-tender.   Musculoskeletal: Normal movement of extremities  Skin: Warm and dry.   Psychiatric: Patient alert and oriented to person, place, and time. Speech and behavior appropriate. Normal mood and affect.       ECG 12 Lead    Date/Time: 10/17/2022 11:55 AM  Performed by: Tristen Brooks APRN  Authorized by: Tristen Brooks APRN   Comparison: compared with " previous ECG   Similar to previous ECG  Rhythm: sinus rhythm  BPM: 64                Assessment:       Diagnosis Plan   1. Premature ventricular contractions        2. Essential hypertension               Plan:   1. PVC---s/p ablation (6/2021)--- she continues to do well post ablation. Had 50% burden before and virtually no PVCs post ablation (<1%). Has occasional palpitations but able to tolerate. EKG today shows NSR.         2. HTN-- well controlled.             She has follow up with Dr. Mayberry next August.  She will follow up with us as needed.               As always, it has been a pleasure to participate in your patient's care.      Sincerely,         JUSTIN Mora

## 2023-03-12 DIAGNOSIS — I10 ESSENTIAL HYPERTENSION: ICD-10-CM

## 2023-03-12 DIAGNOSIS — J30.2 SEASONAL ALLERGIC RHINITIS, UNSPECIFIED TRIGGER: ICD-10-CM

## 2023-03-13 RX ORDER — FUROSEMIDE 40 MG/1
TABLET ORAL
Qty: 90 TABLET | Refills: 3 | Status: SHIPPED | OUTPATIENT
Start: 2023-03-13

## 2023-03-13 RX ORDER — FLUTICASONE PROPIONATE 50 MCG
SPRAY, SUSPENSION (ML) NASAL
Qty: 48 ML | OUTPATIENT
Start: 2023-03-13

## 2023-03-13 RX ORDER — MONTELUKAST SODIUM 10 MG/1
TABLET ORAL
Qty: 90 TABLET | Refills: 3 | Status: SHIPPED | OUTPATIENT
Start: 2023-03-13

## 2023-03-15 ENCOUNTER — OFFICE VISIT (OUTPATIENT)
Dept: FAMILY MEDICINE CLINIC | Facility: CLINIC | Age: 78
End: 2023-03-15
Payer: MEDICARE

## 2023-03-15 VITALS
SYSTOLIC BLOOD PRESSURE: 130 MMHG | WEIGHT: 196 LBS | OXYGEN SATURATION: 99 % | DIASTOLIC BLOOD PRESSURE: 76 MMHG | TEMPERATURE: 97.8 F | BODY MASS INDEX: 33.46 KG/M2 | HEART RATE: 84 BPM | HEIGHT: 64 IN

## 2023-03-15 DIAGNOSIS — Z12.31 VISIT FOR SCREENING MAMMOGRAM: ICD-10-CM

## 2023-03-15 DIAGNOSIS — Z23 IMMUNIZATION DUE: ICD-10-CM

## 2023-03-15 DIAGNOSIS — I10 ESSENTIAL HYPERTENSION: ICD-10-CM

## 2023-03-15 DIAGNOSIS — K59.09 CHRONIC CONSTIPATION: ICD-10-CM

## 2023-03-15 DIAGNOSIS — Z11.59 ENCOUNTER FOR HEPATITIS C SCREENING TEST FOR LOW RISK PATIENT: ICD-10-CM

## 2023-03-15 DIAGNOSIS — I65.23 ATHEROSCLEROSIS OF BOTH CAROTID ARTERIES: ICD-10-CM

## 2023-03-15 DIAGNOSIS — M16.10 PRIMARY OSTEOARTHRITIS OF HIP, UNSPECIFIED LATERALITY: ICD-10-CM

## 2023-03-15 DIAGNOSIS — Z00.00 MEDICARE ANNUAL WELLNESS VISIT, SUBSEQUENT: Primary | ICD-10-CM

## 2023-03-15 DIAGNOSIS — M16.12 ARTHRITIS OF LEFT HIP: ICD-10-CM

## 2023-03-15 DIAGNOSIS — I51.89 GRADE I DIASTOLIC DYSFUNCTION: ICD-10-CM

## 2023-03-15 DIAGNOSIS — E03.8 OTHER SPECIFIED HYPOTHYROIDISM: ICD-10-CM

## 2023-03-15 DIAGNOSIS — Z78.0 POSTMENOPAUSAL: ICD-10-CM

## 2023-03-15 DIAGNOSIS — E78.2 MIXED HYPERLIPIDEMIA: ICD-10-CM

## 2023-03-15 PROCEDURE — G0439 PPPS, SUBSEQ VISIT: HCPCS | Performed by: FAMILY MEDICINE

## 2023-03-15 PROCEDURE — 1170F FXNL STATUS ASSESSED: CPT | Performed by: FAMILY MEDICINE

## 2023-03-15 PROCEDURE — 3075F SYST BP GE 130 - 139MM HG: CPT | Performed by: FAMILY MEDICINE

## 2023-03-15 PROCEDURE — 1159F MED LIST DOCD IN RCRD: CPT | Performed by: FAMILY MEDICINE

## 2023-03-15 PROCEDURE — 99214 OFFICE O/P EST MOD 30 MIN: CPT | Performed by: FAMILY MEDICINE

## 2023-03-15 PROCEDURE — 3078F DIAST BP <80 MM HG: CPT | Performed by: FAMILY MEDICINE

## 2023-03-15 RX ORDER — LEVOTHYROXINE SODIUM 75 MCG
75 TABLET ORAL DAILY
Qty: 90 TABLET | Refills: 3 | Status: SHIPPED | OUTPATIENT
Start: 2023-03-15

## 2023-03-15 RX ORDER — DICLOFENAC SODIUM 75 MG/1
75 TABLET, DELAYED RELEASE ORAL 2 TIMES DAILY
Qty: 180 TABLET | Refills: 2 | Status: SHIPPED | OUTPATIENT
Start: 2023-03-15

## 2023-03-15 RX ORDER — LOSARTAN POTASSIUM 50 MG/1
50 TABLET ORAL DAILY
Qty: 90 TABLET | Refills: 3 | Status: SHIPPED | OUTPATIENT
Start: 2023-03-15

## 2023-03-15 NOTE — PROGRESS NOTES
The ABCs of the Annual Wellness Visit  Subsequent Medicare Wellness Visit    Subjective    Hillary Crenshaw is a 77 y.o. female who presents for a Subsequent Medicare Wellness Visit.    The following portions of the patient's history were reviewed and   updated as appropriate: allergies, current medications, past family history, past medical history, past social history, past surgical history and problem list.    Compared to one year ago, the patient feels her physical   health is the same.    Compared to one year ago, the patient feels her mental   health is the same.    Recent Hospitalizations:  She was not admitted to the hospital during the last year.       Current Medical Providers:  Patient Care Team:  Laura Muñoz MD as PCP - General (Family Medicine)  Jeyson Mayberry MD as Consulting Physician (Cardiology)    Outpatient Medications Prior to Visit   Medication Sig Dispense Refill   • furosemide (LASIX) 40 MG tablet TAKE ONE TABLET BY MOUTH DAILY 90 tablet 3   • montelukast (SINGULAIR) 10 MG tablet TAKE ONE TABLET BY MOUTH ONCE NIGHTLY 90 tablet 3   • diclofenac (VOLTAREN) 75 MG EC tablet TAKE ONE TABLET BY MOUTH TWICE A  tablet 2   • losartan (COZAAR) 50 MG tablet Take 1 tablet by mouth Daily. 90 tablet 3   • Synthroid 75 MCG tablet Take 1 tablet by mouth Daily. TAKE ONE TABLET DAILY 90 tablet 3     No facility-administered medications prior to visit.       No opioid medication identified on active medication list. I have reviewed chart for other potential  high risk medication/s and harmful drug interactions in the elderly.          Aspirin is not on active medication list.  Aspirin use is not indicated based on review of current medical condition/s. Risk of harm outweighs potential benefits.  .    Patient Active Problem List   Diagnosis   • Hypothyroidism   • Incomplete uterovaginal prolapse   • Midline cystocele   • Mixed stress and urge urinary incontinence   • Obesity   • Rectocele   •  "Chronic constipation   • GERD (gastroesophageal reflux disease)   • Lung nodule   • Osteoarthritis   • Osteopenia   • Pessary maintenance   • Post-menopause   • Prolapse of female bladder, acquired   • Endometrial mass   • Mixed hyperlipidemia   • Medicare annual wellness visit, subsequent   • Thyroid cyst   • Atherosclerosis of both carotid arteries   • Premature ventricular contractions   • Atrial ectopy   • Essential hypertension   • Nonrheumatic tricuspid valve regurgitation   • Pulmonary hypertension (HCC)   • Grade I diastolic dysfunction   • Abnormal CBC   • Falls     Advance Care Planning  Advance Directive is not on file.  ACP discussion was held with the patient during this visit. Patient has an advance directive (not in EMR), copy requested.     Objective    Vitals:    03/15/23 1405   BP: 130/76   BP Location: Left arm   Patient Position: Sitting   Pulse: 84   Temp: 97.8 °F (36.6 °C)   SpO2: 99%   Weight: 88.9 kg (196 lb)   Height: 162.6 cm (64.02\")   PainSc: 0-No pain     Estimated body mass index is 33.63 kg/m² as calculated from the following:    Height as of this encounter: 162.6 cm (64.02\").    Weight as of this encounter: 88.9 kg (196 lb).    BMI is >= 30 and <35. (Class 1 Obesity). The following options were offered after discussion;: weight loss educational material (shared in after visit summary) and exercise counseling/recommendations      Does the patient have evidence of cognitive impairment? No          HEALTH RISK ASSESSMENT    Smoking Status:  Social History     Tobacco Use   Smoking Status Former   • Packs/day: 1.50   • Years: 55.00   • Pack years: 82.50   • Types: Cigarettes   • Start date: 1958   • Quit date: 1966   • Years since quittin.2   Smokeless Tobacco Never     Alcohol Consumption:  Social History     Substance and Sexual Activity   Alcohol Use Yes   • Alcohol/week: 1.0 standard drink   • Types: 1 Glasses of wine per week    Comment: 7 drinks week/// Caffeine use: " 2-3 cups daily     Fall Risk Screen:    STEADI Fall Risk Assessment was completed, and patient is at LOW risk for falls.Assessment completed on:3/15/2023    Depression Screening:  PHQ-2/PHQ-9 Depression Screening 3/15/2023   Little Interest or Pleasure in Doing Things 0-->not at all   Feeling Down, Depressed or Hopeless 0-->not at all   PHQ-9: Brief Depression Severity Measure Score 0       Health Habits and Functional and Cognitive Screening:  Functional & Cognitive Status 3/15/2023   Do you have difficulty preparing food and eating? No   Do you have difficulty bathing yourself, getting dressed or grooming yourself? No   Do you have difficulty using the toilet? No   Do you have difficulty moving around from place to place? No   Do you have trouble with steps or getting out of a bed or a chair? No   Current Diet Unhealthy Diet   Dental Exam Up to date   Eye Exam Up to date   Exercise (times per week) 2 times per week   Current Exercises Include Other   Current Exercise Activities Include -   Do you need help using the phone?  No   Are you deaf or do you have serious difficulty hearing?  No   Do you need help with transportation? No   Do you need help shopping? No   Do you need help preparing meals?  No   Do you need help with housework?  No   Do you need help with laundry? No   Do you need help taking your medications? No   Do you need help managing money? No   Do you ever drive or ride in a car without wearing a seat belt? No   Have you felt unusual stress, anger or loneliness in the last month? Yes   Who do you live with? Alone   If you need help, do you have trouble finding someone available to you? No   Have you been bothered in the last four weeks by sexual problems? No   Do you have difficulty concentrating, remembering or making decisions? No       Age-appropriate Screening Schedule:  Refer to the list below for future screening recommendations based on patient's age, sex and/or medical conditions. Orders for  these recommended tests are listed in the plan section. The patient has been provided with a written plan.    Health Maintenance   Topic Date Due   • TDAP/TD VACCINES (1 - Tdap) Never done   • ZOSTER VACCINE (1 of 2) Never done   • HEPATITIS C SCREENING  Never done   • Pneumococcal Vaccine 65+ (2 - PPSV23 if available, else PCV20) 10/09/2020   • COVID-19 Vaccine (4 - Booster) 05/07/2021   • DXA SCAN  03/11/2022   • LIPID PANEL  02/14/2023   • ANNUAL WELLNESS VISIT  03/15/2024   • MAMMOGRAM  05/20/2024   • COLORECTAL CANCER SCREENING  08/21/2027   • INFLUENZA VACCINE  Completed                CMS Preventative Services Quick Reference  Risk Factors Identified During Encounter  None Identified  The above risks/problems have been discussed with the patient.  Pertinent information has been shared with the patient in the After Visit Summary.  An After Visit Summary and PPPS were made available to the patient.    Follow Up:   Next Medicare Wellness visit to be scheduled in 1 year.       Additional E&M Note during same encounter follows:  Patient has multiple medical problems which are significant and separately identifiable that require additional work above and beyond the Medicare Wellness Visit.      Chief Complaint  Medicare Wellness-subsequent    Subjective        HPI  Hillary Crenshaw is also being seen today for     Hypothyroidism- doing well, on meds, Having some constipation and dry skin. Has had a few pound weight gain.      htn- doing well on meds     Hyperlipidemia-  Is not on medication.  Has declined this.Working on diet.      Allergies- stable on meds, better on singulair.      Afib/atherosclerosis/chf- stable and following with cardiology. Skipping some beats and going to an electrophysiologist. He wants to operate and she has declined this.      OA- stable on meds, hip doing well with replacement, follows with ortho     Having some constipation mild but chronic. Doing much better on colace.      pulm htn-   "Following with pulm now. Has not been yet and is not sure she is going to go.         Review of Systems   Respiratory: Negative for shortness of breath.    Cardiovascular: Negative for chest pain.       Objective   Vital Signs:  /76 (BP Location: Left arm, Patient Position: Sitting)   Pulse 84   Temp 97.8 °F (36.6 °C)   Ht 162.6 cm (64.02\")   Wt 88.9 kg (196 lb)   SpO2 99%   BMI 33.63 kg/m²     Physical Exam  Constitutional:       Appearance: Normal appearance. She is well-developed.   Cardiovascular:      Rate and Rhythm: Normal rate and regular rhythm.      Heart sounds: Normal heart sounds.   Pulmonary:      Effort: Pulmonary effort is normal.      Breath sounds: Normal breath sounds.   Musculoskeletal:         General: No swelling. Normal range of motion.   Skin:     General: Skin is warm and dry.      Findings: No rash.   Neurological:      General: No focal deficit present.      Mental Status: She is alert and oriented to person, place, and time.   Psychiatric:         Mood and Affect: Mood normal.         Behavior: Behavior normal.                         Assessment and Plan   Diagnoses and all orders for this visit:    1. Medicare annual wellness visit, subsequent (Primary)    2. Mixed hyperlipidemia  -     Comprehensive Metabolic Panel  -     Lipid Panel    3. Atherosclerosis of both carotid arteries    4. Essential hypertension    5. Grade I diastolic dysfunction    6. Other specified hypothyroidism  -     TSH Rfx On Abnormal To Free T4    7. Primary osteoarthritis of hip, unspecified laterality    8. Immunization due    9. Visit for screening mammogram  -     Mammo Screening Digital Tomosynthesis Bilateral With CAD; Future    10. Postmenopausal  -     DEXA Bone Density Axial; Future    11. Encounter for hepatitis C screening test for low risk patient  -     Hepatitis C Antibody    12. Chronic constipation    13. Arthritis of left hip  -     diclofenac (VOLTAREN) 75 MG EC tablet; Take 1 tablet " by mouth 2 (Two) Times a Day.  Dispense: 180 tablet; Refill: 2    Other orders  -     Synthroid 75 MCG tablet; Take 1 tablet by mouth Daily. TAKE ONE TABLET DAILY  Dispense: 90 tablet; Refill: 3  -     losartan (COZAAR) 50 MG tablet; Take 1 tablet by mouth Daily.  Dispense: 90 tablet; Refill: 3             Follow Up   Return in about 6 months (around 9/15/2023) for Recheck.  Patient was given instructions and counseling regarding her condition or for health maintenance advice. Please see specific information pulled into the AVS if appropriate.       Discussed risk factors, continue medications, follow-up in 6 months. If we have room to increase thyroid medication will increase. Declines shots today, thinks she is utd.

## 2023-03-16 LAB
ALBUMIN SERPL-MCNC: 4.4 G/DL (ref 3.5–5.2)
ALBUMIN/GLOB SERPL: 1.9 G/DL
ALP SERPL-CCNC: 143 U/L (ref 39–117)
ALT SERPL-CCNC: 21 U/L (ref 1–33)
AST SERPL-CCNC: 17 U/L (ref 1–32)
BILIRUB SERPL-MCNC: <0.2 MG/DL (ref 0–1.2)
BUN SERPL-MCNC: 19 MG/DL (ref 8–23)
BUN/CREAT SERPL: 25.7 (ref 7–25)
CALCIUM SERPL-MCNC: 10.6 MG/DL (ref 8.6–10.5)
CHLORIDE SERPL-SCNC: 103 MMOL/L (ref 98–107)
CHOLEST SERPL-MCNC: 251 MG/DL (ref 0–200)
CO2 SERPL-SCNC: 26.2 MMOL/L (ref 22–29)
CREAT SERPL-MCNC: 0.74 MG/DL (ref 0.57–1)
EGFRCR SERPLBLD CKD-EPI 2021: 83.4 ML/MIN/1.73
GLOBULIN SER CALC-MCNC: 2.3 GM/DL
GLUCOSE SERPL-MCNC: 93 MG/DL (ref 65–99)
HCV IGG SERPL QL IA: NON REACTIVE
HDLC SERPL-MCNC: 46 MG/DL (ref 40–60)
LDLC SERPL CALC-MCNC: 133 MG/DL (ref 0–100)
POTASSIUM SERPL-SCNC: 4.6 MMOL/L (ref 3.5–5.2)
PROT SERPL-MCNC: 6.7 G/DL (ref 6–8.5)
SODIUM SERPL-SCNC: 140 MMOL/L (ref 136–145)
TRIGL SERPL-MCNC: 401 MG/DL (ref 0–150)
TSH SERPL DL<=0.005 MIU/L-ACNC: 2.26 UIU/ML (ref 0.27–4.2)
VLDLC SERPL CALC-MCNC: 72 MG/DL (ref 5–40)

## 2023-03-17 DIAGNOSIS — E83.52 HYPERCALCEMIA: Primary | ICD-10-CM

## 2023-03-17 DIAGNOSIS — R74.8 ELEVATED ALKALINE PHOSPHATASE LEVEL: ICD-10-CM

## 2023-04-05 ENCOUNTER — TELEPHONE (OUTPATIENT)
Dept: FAMILY MEDICINE CLINIC | Facility: CLINIC | Age: 78
End: 2023-04-05

## 2023-04-05 NOTE — TELEPHONE ENCOUNTER
PATIENT CALLED FOR MEDICATION REFILL OF  FLONASE.( NOT ON CURRENT MED LIST) THIS MAY BE AN OLD PRESCRIPTION.    [facility] [id number] - [facility] [address] [place] [address] RD AT [facility] - [phone] [facility] [phone]   [phone]    CALL BACK NUMBER [phone]

## 2023-04-06 RX ORDER — FLUTICASONE PROPIONATE 50 MCG
2 SPRAY, SUSPENSION (ML) NASAL DAILY
Qty: 18 ML | Refills: 0 | Status: SHIPPED | OUTPATIENT
Start: 2023-04-06

## 2023-04-06 RX ORDER — FLUTICASONE PROPIONATE 50 MCG
2 SPRAY, SUSPENSION (ML) NASAL DAILY
COMMUNITY
End: 2023-04-06 | Stop reason: SDUPTHER

## 2023-04-06 NOTE — TELEPHONE ENCOUNTER
PATIENT CALLED FOR MEDICATION REFILL OF  FLONASE.( NOT ON CURRENT MED LIST) THIS MAY BE AN OLD PRESCRIPTION.

## 2023-04-06 NOTE — TELEPHONE ENCOUNTER
SENT THIS MEDICATION OVER TO PROVIDER TO REVIEW. PLEASE ALLOW 48 TO 72 HRS     OK FOR HUB TO RELAY

## 2023-09-11 ENCOUNTER — OFFICE VISIT (OUTPATIENT)
Dept: FAMILY MEDICINE CLINIC | Facility: CLINIC | Age: 78
End: 2023-09-11
Payer: MEDICARE

## 2023-09-11 VITALS
WEIGHT: 193 LBS | TEMPERATURE: 98.4 F | HEART RATE: 57 BPM | DIASTOLIC BLOOD PRESSURE: 84 MMHG | HEIGHT: 64 IN | OXYGEN SATURATION: 98 % | BODY MASS INDEX: 32.95 KG/M2 | SYSTOLIC BLOOD PRESSURE: 118 MMHG

## 2023-09-11 DIAGNOSIS — E78.2 MIXED HYPERLIPIDEMIA: Primary | ICD-10-CM

## 2023-09-11 DIAGNOSIS — I51.89 GRADE I DIASTOLIC DYSFUNCTION: ICD-10-CM

## 2023-09-11 DIAGNOSIS — I27.20 PULMONARY HYPERTENSION: ICD-10-CM

## 2023-09-11 DIAGNOSIS — M16.12 ARTHRITIS OF LEFT HIP: ICD-10-CM

## 2023-09-11 DIAGNOSIS — K59.09 CHRONIC CONSTIPATION: ICD-10-CM

## 2023-09-11 DIAGNOSIS — Z13.1 DIABETES MELLITUS SCREENING: ICD-10-CM

## 2023-09-11 DIAGNOSIS — I10 ESSENTIAL HYPERTENSION: ICD-10-CM

## 2023-09-11 DIAGNOSIS — E03.8 OTHER SPECIFIED HYPOTHYROIDISM: ICD-10-CM

## 2023-09-11 DIAGNOSIS — I65.23 ATHEROSCLEROSIS OF BOTH CAROTID ARTERIES: ICD-10-CM

## 2023-09-11 DIAGNOSIS — R79.9 ABNORMAL FINDING OF BLOOD CHEMISTRY, UNSPECIFIED: ICD-10-CM

## 2023-09-11 DIAGNOSIS — M16.10 PRIMARY OSTEOARTHRITIS OF HIP, UNSPECIFIED LATERALITY: ICD-10-CM

## 2023-09-11 RX ORDER — FUROSEMIDE 40 MG/1
40 TABLET ORAL DAILY
Qty: 90 TABLET | Refills: 3 | Status: CANCELLED | OUTPATIENT
Start: 2023-09-11

## 2023-09-11 RX ORDER — LOSARTAN POTASSIUM 50 MG/1
50 TABLET ORAL DAILY
Qty: 90 TABLET | Refills: 3 | Status: SHIPPED | OUTPATIENT
Start: 2023-09-11

## 2023-09-11 RX ORDER — TETRACYCLINE HYDROCHLORIDE 250 MG/1
250 CAPSULE ORAL 4 TIMES DAILY
COMMUNITY

## 2023-09-11 RX ORDER — MONTELUKAST SODIUM 10 MG/1
10 TABLET ORAL NIGHTLY
Qty: 90 TABLET | Refills: 3 | Status: SHIPPED | OUTPATIENT
Start: 2023-09-11

## 2023-09-11 RX ORDER — FUROSEMIDE 20 MG/1
20 TABLET ORAL 2 TIMES DAILY
Qty: 90 TABLET | Refills: 3 | Status: SHIPPED | OUTPATIENT
Start: 2023-09-11

## 2023-09-11 RX ORDER — LEVOTHYROXINE SODIUM 75 MCG
75 TABLET ORAL DAILY
Qty: 90 TABLET | Refills: 3 | Status: SHIPPED | OUTPATIENT
Start: 2023-09-11

## 2023-09-11 RX ORDER — DICLOFENAC SODIUM 75 MG/1
75 TABLET, DELAYED RELEASE ORAL 2 TIMES DAILY
Qty: 180 TABLET | Refills: 3 | Status: SHIPPED | OUTPATIENT
Start: 2023-09-11

## 2023-09-11 NOTE — PROGRESS NOTES
Subjective   Hillary Crenshaw is a 78 y.o. female.     Chief Complaint   Patient presents with    Hypothyroidism       History of Present Illness   Hypothyroidism- doing well, on meds, Having some constipation and dry skin. Has worked on weight loss and has lost 3 pounds!      htn- doing well on meds     Hyperlipidemia-  Is not on medication. Has declined this. Working on diet.      Allergies- stable on meds, better on singulair.      Afib/atherosclerosis/chf- stable and following with cardiology. Skipping some beats and going to an electrophysiologist. He wants to operate and she has declined this. She is peeing a lot and wanting to come down on water pills. Understands risks. Will cut back on salt.      OA- stable on meds, hip doing well with replacement, follows with ortho     Having some constipation mild but chronic. Doing much better with diet modifications     pulm htn-  Following with pulm now. Has not been yet and is not sure she is going to go. Recent echo ok. She never went to pulm.     Cough and congestion and sinus pain for 3 weeks and this is resolved with augmentin, covid was neg. She just wanted to tell me.     The following portions of the patient's history were reviewed and updated as appropriate: allergies, current medications, past family history, past medical history, past social history, past surgical history and problem list.    Past Medical History:   Diagnosis Date    Abnormal stool test     Acute allergic rhinitis     Acute sinusitis     Allergic rhinitis     Ankle pain     Arthritis of left hip     Atherosclerosis of both carotid arteries     2021: right 16-49%, left with plaque    Atrial ectopy     Boil, thigh     Cataract surgery 2020    CHF (congestive heart failure) spring2021    Chronic constipation     Gastritis     GERD (gastroesophageal reflux disease)     Grade I diastolic dysfunction 06/01/2021    Head ache     Hyperlipidemia     Hypertension     Hypervitaminosis D      Hypothyroidism     Lung nodule     TARI (obstructive sleep apnea)     unable to tolerate CPAP    Osteoarthritis     left hip    Osteopenia     Pessary maintenance     Post-menopause     Premature ventricular contractions     Prolapse of female bladder, acquired     PSVT (paroxysmal supraventricular tachycardia)     Pulmonary hypertension     Severe per echo 2021    Rectal bleeding     Right foot pain     Urine frequency     UTI (urinary tract infection)        Past Surgical History:   Procedure Laterality Date    CARDIAC ELECTROPHYSIOLOGY PROCEDURE N/A 6/3/2022    Procedure: Ablation PVC;  Surgeon: Jasbir De La Torre MD;  Location: [facility] INVASIVE LOCATION;  Service: Cardiovascular;  Laterality: N/A;    FOOT SURGERY      TOTAL HIP ARTHROPLASTY         Family History   Problem Relation Age of Onset    Breast cancer Mother     Heart attack Father     Alcohol abuse Sister     Arthritis Sister     Diabetes Sister     Hyperlipidemia Sister     Osteoporosis Sister     Thyroid disease Sister     Alcohol abuse Brother     Hypertension Brother     Hyperlipidemia Brother     Lung cancer Brother     Heart attack Brother     Bipolar disorder Maternal Grandmother     Breast cancer Maternal Grandmother     Depression Maternal Grandmother     Other Other         cardiac disorder, neoplasm of breast    No Known Problems Maternal Grandfather     No Known Problems Paternal Grandmother     No Known Problems Paternal Grandfather        Social History     Socioeconomic History    Marital status:    Tobacco Use    Smoking status: Former     Packs/day: 1.50     Years: 55.00     Pack years: 82.50     Types: Cigarettes     Start date: 1958     Quit date: 1966     Years since quittin.7    Smokeless tobacco: Never   Vaping Use    Vaping Use: Never used   Substance and Sexual Activity    Alcohol use: Yes     Alcohol/week: 1.0 standard drink     Types: 1 Glasses of wine per week     Comment: 7 drinks week///  "Caffeine use: 2-3 cups daily    Drug use: Never    Sexual activity: Not Currently     Partners: Male     Birth control/protection: None       Review of Systems   Constitutional:  Negative for fever.   Respiratory:  Negative for shortness of breath.    Cardiovascular:  Negative for chest pain.     Objective   Visit Vitals  /84 (BP Location: Left arm, Patient Position: Sitting, Cuff Size: Adult)   Pulse 57   Temp 98.4 °F (36.9 °C) (Temporal)   Ht 162.6 cm (64.02\")   Wt 87.5 kg (193 lb)   SpO2 98%   BMI 33.11 kg/m²     Body mass index is 33.11 kg/m².  Physical Exam  Constitutional:       Appearance: Normal appearance. She is well-developed.   Cardiovascular:      Rate and Rhythm: Normal rate and regular rhythm.      Heart sounds: Normal heart sounds.   Pulmonary:      Effort: Pulmonary effort is normal.      Breath sounds: Normal breath sounds.   Musculoskeletal:         General: No swelling. Normal range of motion.   Skin:     General: Skin is warm and dry.      Findings: No rash.   Neurological:      General: No focal deficit present.      Mental Status: She is alert and oriented to person, place, and time.   Psychiatric:         Mood and Affect: Mood normal.         Behavior: Behavior normal.         Assessment & Plan   Diagnoses and all orders for this visit:    1. Mixed hyperlipidemia (Primary)    2. Atherosclerosis of both carotid arteries    3. Essential hypertension  -     Comprehensive Metabolic Panel  -     Lipid Panel  -     losartan (COZAAR) 50 MG tablet; Take 1 tablet by mouth Daily.  Dispense: 90 tablet; Refill: 3    4. Grade I diastolic dysfunction  -     furosemide (Lasix) 20 MG tablet; Take 1 tablet by mouth 2 (Two) Times a Day.  Dispense: 90 tablet; Refill: 3    5. Other specified hypothyroidism  -     TSH Rfx On Abnormal To Free T4  -     Synthroid 75 MCG tablet; Take 1 tablet by mouth Daily. TAKE ONE TABLET DAILY  Dispense: 90 tablet; Refill: 3    6. Chronic constipation    7. Primary " osteoarthritis of hip, unspecified laterality    8. Pulmonary hypertension    9. Arthritis of left hip  -     diclofenac (VOLTAREN) 75 MG EC tablet; Take 1 tablet by mouth 2 (Two) Times a Day.  Dispense: 180 tablet; Refill: 3    10. Diabetes mellitus screening  -     Hemoglobin A1c    11. Abnormal finding of blood chemistry, unspecified  -     Hemoglobin A1c    Other orders  -     montelukast (SINGULAIR) 10 MG tablet; Take 1 tablet by mouth Every Night.  Dispense: 90 tablet; Refill: 3          Decreased lasix dose and f/u in 6 months. Can go back up on it if needed. If pt has diabetes she is wanting the weight loss injection. Cont meds and will go over pulm htn with cardiology.

## 2023-09-12 LAB
ALBUMIN SERPL-MCNC: 4.7 G/DL (ref 3.5–5.2)
ALBUMIN/GLOB SERPL: 2.1 G/DL
ALP SERPL-CCNC: 153 U/L (ref 39–117)
ALT SERPL-CCNC: 19 U/L (ref 1–33)
AST SERPL-CCNC: 15 U/L (ref 1–32)
BILIRUB SERPL-MCNC: 0.4 MG/DL (ref 0–1.2)
BUN SERPL-MCNC: 16 MG/DL (ref 8–23)
BUN/CREAT SERPL: 25.8 (ref 7–25)
CALCIUM SERPL-MCNC: 10.5 MG/DL (ref 8.6–10.5)
CHLORIDE SERPL-SCNC: 104 MMOL/L (ref 98–107)
CHOLEST SERPL-MCNC: 262 MG/DL (ref 0–200)
CO2 SERPL-SCNC: 26.4 MMOL/L (ref 22–29)
CREAT SERPL-MCNC: 0.62 MG/DL (ref 0.57–1)
EGFRCR SERPLBLD CKD-EPI 2021: 91.3 ML/MIN/1.73
GLOBULIN SER CALC-MCNC: 2.2 GM/DL
GLUCOSE SERPL-MCNC: 82 MG/DL (ref 65–99)
HBA1C MFR BLD: 6 % (ref 4.8–5.6)
HDLC SERPL-MCNC: 49 MG/DL (ref 40–60)
LDLC SERPL CALC-MCNC: 160 MG/DL (ref 0–100)
POTASSIUM SERPL-SCNC: 4.7 MMOL/L (ref 3.5–5.2)
PROT SERPL-MCNC: 6.9 G/DL (ref 6–8.5)
SODIUM SERPL-SCNC: 140 MMOL/L (ref 136–145)
TRIGL SERPL-MCNC: 282 MG/DL (ref 0–150)
TSH SERPL DL<=0.005 MIU/L-ACNC: 2.05 UIU/ML (ref 0.27–4.2)
VLDLC SERPL CALC-MCNC: 53 MG/DL (ref 5–40)

## 2023-09-17 RX ORDER — FLUTICASONE PROPIONATE 50 MCG
SPRAY, SUSPENSION (ML) NASAL
Qty: 16 ML | Refills: 11 | Status: SHIPPED | OUTPATIENT
Start: 2023-09-17

## 2023-10-09 ENCOUNTER — CLINICAL SUPPORT (OUTPATIENT)
Dept: FAMILY MEDICINE CLINIC | Facility: CLINIC | Age: 78
End: 2023-10-09
Payer: MEDICARE

## 2023-10-09 DIAGNOSIS — Z23 IMMUNIZATION DUE: Primary | ICD-10-CM

## 2023-10-09 PROCEDURE — G0008 ADMIN INFLUENZA VIRUS VAC: HCPCS | Performed by: FAMILY MEDICINE

## 2023-10-09 PROCEDURE — 90662 IIV NO PRSV INCREASED AG IM: CPT | Performed by: FAMILY MEDICINE

## 2023-11-01 NOTE — PROGRESS NOTES
RM:________     PCP: Laura Muñoz MD    : 1945  AGE: 78 y.o.  EST PATIENT     REASON FOR VISIT/  CC:        BP Readings from Last 3 Encounters:   23 118/84   03/15/23 130/76   10/17/22 132/84      Wt Readings from Last 3 Encounters:   23 87.5 kg (193 lb)   03/15/23 88.9 kg (196 lb)   10/17/22 86.2 kg (190 lb)        WT: ____________ BP: __________L __________R HR______    CHEST PAIN: _____________    SOA: _____________PALPS: _______________     LIGHTHEADED: ___________FATIGUE: ________________ EDEMA __________    ALLERGIES:Patient has no known allergies. SMOKING HISTORY:  Social History     Tobacco Use    Smoking status: Former     Packs/day: 1.50     Years: 55.00     Additional pack years: 0.00     Total pack years: 82.50     Types: Cigarettes     Start date: 1958     Quit date: 1966     Years since quittin.8    Smokeless tobacco: Never   Vaping Use    Vaping Use: Never used   Substance Use Topics    Alcohol use: Yes     Alcohol/week: 1.0 standard drink of alcohol     Types: 1 Glasses of wine per week     Comment: 7 drinks week/// Caffeine use: 2-3 cups daily    Drug use: Never     CAFFEINE USE_________________  ALCOHOL ______________________

## 2023-11-08 ENCOUNTER — OFFICE VISIT (OUTPATIENT)
Dept: CARDIOLOGY | Facility: CLINIC | Age: 78
End: 2023-11-08
Payer: MEDICARE

## 2023-11-08 ENCOUNTER — TRANSCRIBE ORDERS (OUTPATIENT)
Dept: ADMINISTRATIVE | Facility: HOSPITAL | Age: 78
End: 2023-11-08
Payer: MEDICARE

## 2023-11-08 VITALS
BODY MASS INDEX: 32.95 KG/M2 | HEART RATE: 52 BPM | SYSTOLIC BLOOD PRESSURE: 118 MMHG | WEIGHT: 193 LBS | DIASTOLIC BLOOD PRESSURE: 68 MMHG | HEIGHT: 64 IN

## 2023-11-08 DIAGNOSIS — Z13.9 VISIT FOR SCREENING: Primary | ICD-10-CM

## 2023-11-08 DIAGNOSIS — I49.1 ATRIAL ECTOPY: Primary | ICD-10-CM

## 2023-11-08 DIAGNOSIS — I49.3 PREMATURE VENTRICULAR CONTRACTIONS: ICD-10-CM

## 2023-11-08 DIAGNOSIS — I10 ESSENTIAL HYPERTENSION: ICD-10-CM

## 2023-11-08 DIAGNOSIS — I65.21 CAROTID ATHEROSCLEROSIS, RIGHT: ICD-10-CM

## 2023-11-08 PROCEDURE — 1160F RVW MEDS BY RX/DR IN RCRD: CPT | Performed by: INTERNAL MEDICINE

## 2023-11-08 PROCEDURE — 1159F MED LIST DOCD IN RCRD: CPT | Performed by: INTERNAL MEDICINE

## 2023-11-08 PROCEDURE — 3078F DIAST BP <80 MM HG: CPT | Performed by: INTERNAL MEDICINE

## 2023-11-08 PROCEDURE — 99214 OFFICE O/P EST MOD 30 MIN: CPT | Performed by: INTERNAL MEDICINE

## 2023-11-08 PROCEDURE — 93000 ELECTROCARDIOGRAM COMPLETE: CPT | Performed by: INTERNAL MEDICINE

## 2023-11-08 PROCEDURE — 3074F SYST BP LT 130 MM HG: CPT | Performed by: INTERNAL MEDICINE

## 2023-11-08 NOTE — PROGRESS NOTES
Date of Office Visit: 23  Encounter Provider: Jeyson Mayberry MD  Place of Service: [facility] CARDIOLOGY  Patient Name: Hillary Crenshaw  :1945    Chief Complaint   Patient presents with    Premature ventricular contractions   :     HPI:     Ms. Crenshaw is 78 y.o. and presents today to follow up. I have reviewed prior notes and there are no changes except for any new updates described below. I have also reviewed any information entered into the medical record by the patient or by ancillary staff.     In 2016, she had an episode of syncope. It was her second episode. Both of them were preceded by abrupt onset, extremely rapid heart beat. She had a Holter and an echo.  The Holter showed frequent monomorphic PVCs (22% burden) and occasional PACs.  There were short bursts of PACs. In her chart, she was given a diagnosis of atrial fibrillation, but it does not appear that she was ever actually diagnosed with atrial fibrillation.  It was felt that her symptoms were brought on by SVT. She was placed on diltiazem.    She presented in  for palpitations and edema. An echo showed normal LVSF, EF 55%, grade 1 diastolic dysfunction, moderate TR, and moderate PHTN (RVSP 49mm Hg). A Hoter showed a 50% burden of monomorphic PVCs.  She was placed on furosemide, and referred to EP.  He recommended a PVC ablation, but she elected to hold off as she was doing fairly well.     We ultimately stopped diltiazem due to sinus bradycardia. A follow up echo in  showed normal LVSF, normal diastolic function for age, and normal RVSP.     She has rare mild pedal edema. She rarely has any palpitations.  She has not had any lightheadedness or syncope.  She has not had chest pain.      She is known to have very mild carotid atherosclerosis, (16 to 49% on the right, last checked in ).    Past Medical History:   Diagnosis Date    Abnormal stool test     Acute allergic rhinitis     Acute sinusitis      Allergic rhinitis     Ankle pain     Arthritis of left hip     Atherosclerosis of both carotid arteries     : right 16-49%, left with plaque    Atrial ectopy     Boil, thigh     Cataract surgery     CHF (congestive heart failure)     Chronic constipation     Gastritis     GERD (gastroesophageal reflux disease)     Grade I diastolic dysfunction 2021    Head ache     Hyperlipidemia     Hypertension     Hypervitaminosis D     Hypothyroidism     Lung nodule     Medicare annual wellness visit, subsequent 2020    TARI (obstructive sleep apnea)     unable to tolerate CPAP    Osteoarthritis     left hip    Osteopenia     Pessary maintenance     Post-menopause     Premature ventricular contractions     Prolapse of female bladder, acquired     PSVT (paroxysmal supraventricular tachycardia)     Pulmonary hypertension     Severe per echo 2021    Rectal bleeding     Right foot pain     Urine frequency     UTI (urinary tract infection)        Past Surgical History:   Procedure Laterality Date    CARDIAC ELECTROPHYSIOLOGY PROCEDURE N/A 6/3/2022    Procedure: Ablation PVC;  Surgeon: Jasbir De La Torre MD;  Location: [facility] INVASIVE LOCATION;  Service: Cardiovascular;  Laterality: N/A;    FOOT SURGERY      TOTAL HIP ARTHROPLASTY         Social History     Socioeconomic History    Marital status:    Tobacco Use    Smoking status: Former     Packs/day: 1.50     Years: 55.00     Additional pack years: 0.00     Total pack years: 82.50     Types: Cigarettes     Start date: 1958     Quit date: 1966     Years since quittin.8    Smokeless tobacco: Never   Vaping Use    Vaping Use: Never used   Substance and Sexual Activity    Alcohol use: Yes     Alcohol/week: 1.0 standard drink of alcohol     Types: 1 Glasses of wine per week     Comment: 7 drinks week/// Caffeine use: 2-3 cups daily    Drug use: Never    Sexual activity: Not Currently     Partners: Male     Birth  "control/protection: None       Family History   Problem Relation Age of Onset    Breast cancer Mother     Heart attack Father     Alcohol abuse Sister     Arthritis Sister     Diabetes Sister     Hyperlipidemia Sister     Osteoporosis Sister     Thyroid disease Sister     Alcohol abuse Brother     Hypertension Brother     Hyperlipidemia Brother     Lung cancer Brother     Heart attack Brother     Bipolar disorder Maternal Grandmother     Breast cancer Maternal Grandmother     Depression Maternal Grandmother     Other Other         cardiac disorder, neoplasm of breast    No Known Problems Maternal Grandfather     No Known Problems Paternal Grandmother     No Known Problems Paternal Grandfather      Review of Systems   All other systems reviewed and are negative.    No Known Allergies      Current Outpatient Medications:     diclofenac (VOLTAREN) 75 MG EC tablet, Take 1 tablet by mouth 2 (Two) Times a Day., Disp: 180 tablet, Rfl: 3    fluticasone (FLONASE) 50 MCG/ACT nasal spray, SPRAY TWO SPRAYS IN EACH NOSTRIL ONCE DAILY, Disp: 16 mL, Rfl: 11    furosemide (Lasix) 20 MG tablet, Take 1 tablet by mouth 2 (Two) Times a Day. (Patient taking differently: Take 1 tablet by mouth Daily.), Disp: 90 tablet, Rfl: 3    losartan (COZAAR) 50 MG tablet, Take 1 tablet by mouth Daily., Disp: 90 tablet, Rfl: 3    montelukast (SINGULAIR) 10 MG tablet, Take 1 tablet by mouth Every Night., Disp: 90 tablet, Rfl: 3    Synthroid 75 MCG tablet, Take 1 tablet by mouth Daily. TAKE ONE TABLET DAILY, Disp: 90 tablet, Rfl: 3    tetracycline (ACHROMYCIN,SUMYCIN) 250 MG capsule, Take 1 capsule by mouth 4 (Four) Times a Day., Disp: , Rfl:       Objective:     Vitals:    11/08/23 1039 11/08/23 1049   BP: 140/66 118/68   BP Location: Left arm    Pulse: 52    Weight: 87.5 kg (193 lb)    Height: 162.6 cm (64.02\")        Body mass index is 33.11 kg/m².    Vitals reviewed.   Constitutional:       Appearance: Well-developed and not in distress.   Eyes:    "   Conjunctiva/sclera: Conjunctivae normal.   HENT:      Head: Normocephalic.      Nose: Nose normal.   Neck:      Thyroid: Thyroid normal.      Vascular: No JVD. JVD normal.   Pulmonary:      Effort: Pulmonary effort is normal.      Breath sounds: Normal breath sounds.   Cardiovascular:      Normal rate. Occasional ectopic beats. Regular rhythm.      Murmurs: There is no murmur.   Pulses:     Intact distal pulses.   Edema:     Peripheral edema absent.   Abdominal:      Palpations: Abdomen is soft.      Tenderness: There is no abdominal tenderness.   Musculoskeletal: Normal range of motion.      Cervical back: Normal range of motion. Skin:     General: Skin is warm and dry.      Findings: No rash.   Neurological:      Mental Status: Alert, oriented to person, place, and time and oriented to person, place and time.      Cranial Nerves: No cranial nerve deficit.   Psychiatric:         Behavior: Behavior normal.         Thought Content: Thought content normal.         Judgment: Judgment normal.           ECG 12 Lead    Date/Time: 11/8/2023 10:42 AM  Performed by: Jeyson Mayberry MD    Authorized by: Jeyson Mayberry MD  Comparison: compared with previous ECG   Similar to previous ECG  Rhythm: sinus rhythm  Conduction: conduction normal  ST Segments: ST segments normal  T Waves: T waves normal  QRS axis: normal  Other: no other findings    Clinical impression: normal ECG          Assessment:       Diagnosis Plan   1. Atrial ectopy  ECG 12 Lead      2. Premature ventricular contractions  ECG 12 Lead      3. Essential hypertension        4. Carotid atherosclerosis, right             Plan:       1/2.  She has a history of infrequent PACs and frequent monomorphic PVCs.  They are minimally symptomatic.  She has been offered PVC ablation, but would like to hold off unless it is absolutely necessary.  At this point, they seem to be quiescent. She was on diltiazem for a while but we stopped it as her HR was low and the PVCs had  improved. She has normal LVSF (last checked in 2022).    3. Her BP was a bit high upon arrival but was normal upon recheck.    4.  She had mild, less than 50% disease, on the right in 2021. I asked her to call for a screening ultrasound at [place] and gave her the phone number.    Sincerely,       Jeyson Mayberry MD

## 2023-11-27 ENCOUNTER — OFFICE VISIT (OUTPATIENT)
Dept: FAMILY MEDICINE CLINIC | Facility: CLINIC | Age: 78
End: 2023-11-27
Payer: MEDICARE

## 2023-11-27 VITALS
OXYGEN SATURATION: 97 % | DIASTOLIC BLOOD PRESSURE: 70 MMHG | SYSTOLIC BLOOD PRESSURE: 112 MMHG | HEART RATE: 58 BPM | TEMPERATURE: 97.6 F | BODY MASS INDEX: 32.76 KG/M2 | WEIGHT: 191 LBS

## 2023-11-27 DIAGNOSIS — J01.10 ACUTE NON-RECURRENT FRONTAL SINUSITIS: ICD-10-CM

## 2023-11-27 DIAGNOSIS — R05.9 COUGH, UNSPECIFIED TYPE: Primary | ICD-10-CM

## 2023-11-27 DIAGNOSIS — J02.9 SORETHROAT: ICD-10-CM

## 2023-11-27 LAB
EXPIRATION DATE: NORMAL
EXPIRATION DATE: NORMAL
FLUAV AG UPPER RESP QL IA.RAPID: NOT DETECTED
FLUBV AG UPPER RESP QL IA.RAPID: NOT DETECTED
INTERNAL CONTROL: NORMAL
INTERNAL CONTROL: NORMAL
Lab: NORMAL
Lab: NORMAL
S PYO AG THROAT QL: NEGATIVE
SARS-COV-2 AG UPPER RESP QL IA.RAPID: NOT DETECTED

## 2023-11-27 PROCEDURE — 1160F RVW MEDS BY RX/DR IN RCRD: CPT | Performed by: NURSE PRACTITIONER

## 2023-11-27 PROCEDURE — 99213 OFFICE O/P EST LOW 20 MIN: CPT | Performed by: NURSE PRACTITIONER

## 2023-11-27 PROCEDURE — 1159F MED LIST DOCD IN RCRD: CPT | Performed by: NURSE PRACTITIONER

## 2023-11-27 PROCEDURE — 87428 SARSCOV & INF VIR A&B AG IA: CPT | Performed by: NURSE PRACTITIONER

## 2023-11-27 PROCEDURE — 87880 STREP A ASSAY W/OPTIC: CPT | Performed by: NURSE PRACTITIONER

## 2023-11-27 PROCEDURE — 3074F SYST BP LT 130 MM HG: CPT | Performed by: NURSE PRACTITIONER

## 2023-11-27 PROCEDURE — 3078F DIAST BP <80 MM HG: CPT | Performed by: NURSE PRACTITIONER

## 2023-11-27 RX ORDER — FLUTICASONE PROPIONATE 50 MCG
2 SPRAY, SUSPENSION (ML) NASAL DAILY
Qty: 16 ML | Refills: 11 | Status: SHIPPED | OUTPATIENT
Start: 2023-11-27

## 2023-11-27 RX ORDER — PREDNISONE 20 MG/1
20 TABLET ORAL DAILY
Qty: 5 TABLET | Refills: 0 | Status: SHIPPED | OUTPATIENT
Start: 2023-11-27

## 2023-11-27 NOTE — PROGRESS NOTES
"Chief Complaint  Cough (Prod.), Nasal Congestion (Seen at ), and Sore Throat    Subjective        Hillary Crenshaw presents to [facility] PR[facility]  History of Present Illness  Patient here to follow up from urgent care visit on 11/19/23. She presented with cough, congestion, body aches and was negative for covid and flu. Tested positive for strep though she wasn't having symptoms of sore throat, still doesn't have a sore throat but not feeling better from other symptoms. She took 7 days of amoxicillin with not much change in symptoms.    Objective   Vital Signs:  /70 (BP Location: Left arm, Patient Position: Sitting, Cuff Size: Large Adult)   Pulse 58   Temp 97.6 °F (36.4 °C) (Temporal)   Wt 86.6 kg (191 lb)   SpO2 97%   BMI 32.76 kg/m²   Estimated body mass index is 32.76 kg/m² as calculated from the following:    Height as of 11/8/23: 162.6 cm (64.02\").    Weight as of this encounter: 86.6 kg (191 lb).               Physical Exam  Constitutional:       Appearance: Normal appearance. She is obese.   HENT:      Head: Normocephalic.      Nose: Congestion present.      Mouth/Throat:      Pharynx: Posterior oropharyngeal erythema present.   Eyes:      Extraocular Movements: Extraocular movements intact.      Pupils: Pupils are equal, round, and reactive to light.   Cardiovascular:      Rate and Rhythm: Normal rate and regular rhythm.   Pulmonary:      Effort: Pulmonary effort is normal.      Breath sounds: Normal breath sounds.   Musculoskeletal:         General: Normal range of motion.      Cervical back: Normal range of motion.   Skin:     General: Skin is warm and dry.   Neurological:      General: No focal deficit present.      Mental Status: She is alert and oriented to person, place, and time.   Psychiatric:         Mood and Affect: Mood normal.        Result Review :                   Assessment and Plan   Diagnoses and all orders for this visit:    1. Cough, unspecified type " (Primary)  -     POCT SARS-CoV-2 Antigen TRINH + Flu    2. Sorethroat  -     POCT rapid strep A    3. Acute non-recurrent frontal sinusitis  -     predniSONE (DELTASONE) 20 MG tablet; Take 1 tablet by mouth Daily.  Dispense: 5 tablet; Refill: 0  -     fluticasone (FLONASE) 50 MCG/ACT nasal spray; 2 sprays by Each Nare route Daily.  Dispense: 16 mL; Refill: 11             Follow Up   Return if symptoms worsen or fail to improve.  Patient was given instructions and counseling regarding her condition or for health maintenance advice. Please see specific information pulled into the AVS if appropriate.

## 2023-12-05 ENCOUNTER — OFFICE VISIT (OUTPATIENT)
Dept: FAMILY MEDICINE CLINIC | Facility: CLINIC | Age: 78
End: 2023-12-05
Payer: MEDICARE

## 2023-12-05 VITALS
HEART RATE: 80 BPM | BODY MASS INDEX: 32.61 KG/M2 | WEIGHT: 191 LBS | RESPIRATION RATE: 12 BRPM | SYSTOLIC BLOOD PRESSURE: 112 MMHG | HEIGHT: 64 IN | DIASTOLIC BLOOD PRESSURE: 70 MMHG

## 2023-12-05 DIAGNOSIS — J20.8 ACUTE BRONCHITIS DUE TO OTHER SPECIFIED ORGANISMS: Primary | ICD-10-CM

## 2023-12-05 PROCEDURE — 99213 OFFICE O/P EST LOW 20 MIN: CPT | Performed by: FAMILY MEDICINE

## 2023-12-05 PROCEDURE — 3078F DIAST BP <80 MM HG: CPT | Performed by: FAMILY MEDICINE

## 2023-12-05 PROCEDURE — 3074F SYST BP LT 130 MM HG: CPT | Performed by: FAMILY MEDICINE

## 2023-12-05 RX ORDER — PREDNISONE 20 MG/1
TABLET ORAL
Qty: 18 TABLET | Refills: 0 | Status: SHIPPED | OUTPATIENT
Start: 2023-12-05

## 2023-12-05 NOTE — PROGRESS NOTES
Complaint of cough and congestion.    Subjective   Hillary Crenshaw is a 78 y.o. female.     History of Present Illness   Complaint of cough and congestion for last 3-1/2 to 4 weeks.  Patient states is not worse but is not getting any better.  She states she is having a lot of drainage and cough that persist.  No fever or chills.  The following portions of the patient's history were reviewed and updated as appropriate: allergies, current medications, past family history, past medical history, past social history, past surgical history and problem list.    Review of Systems   Constitutional:  Negative for appetite change and fatigue.   HENT:  Positive for rhinorrhea. Negative for nosebleeds and sore throat.    Eyes:  Negative for blurred vision and visual disturbance.   Respiratory:  Positive for cough. Negative for shortness of breath and wheezing.    Cardiovascular:  Negative for chest pain and leg swelling.   Gastrointestinal:  Negative for abdominal distention and abdominal pain.   Endocrine: Negative for cold intolerance and polyuria.   Genitourinary:  Negative for dysuria and hematuria.   Musculoskeletal:  Negative for arthralgias and myalgias.   Skin:  Negative for color change and rash.   Neurological:  Negative for weakness and confusion.   Psychiatric/Behavioral:  Negative for agitation and depressed mood.        Patient Active Problem List   Diagnosis    Hypothyroidism    Incomplete uterovaginal prolapse    Midline cystocele    Mixed stress and urge urinary incontinence    Obesity    Rectocele    Chronic constipation    GERD (gastroesophageal reflux disease)    Osteoarthritis    Osteopenia    Pessary maintenance    Post-menopause    Prolapse of female bladder, acquired    Endometrial mass    Mixed hyperlipidemia    Medicare annual wellness visit, subsequent    Thyroid cyst    Premature ventricular contractions    Atrial ectopy    Essential hypertension    Abnormal CBC    Falls    Acute bronchitis due to  other specified organisms       No Known Allergies      Current Outpatient Medications:     diclofenac (VOLTAREN) 75 MG EC tablet, Take 1 tablet by mouth 2 (Two) Times a Day., Disp: 180 tablet, Rfl: 3    fluticasone (FLONASE) 50 MCG/ACT nasal spray, 2 sprays by Each Nare route Daily., Disp: 16 mL, Rfl: 11    furosemide (Lasix) 20 MG tablet, Take 1 tablet by mouth 2 (Two) Times a Day. (Patient taking differently: Take 1 tablet by mouth Daily.), Disp: 90 tablet, Rfl: 3    losartan (COZAAR) 50 MG tablet, Take 1 tablet by mouth Daily., Disp: 90 tablet, Rfl: 3    montelukast (SINGULAIR) 10 MG tablet, Take 1 tablet by mouth Every Night., Disp: 90 tablet, Rfl: 3    predniSONE (DELTASONE) 20 MG tablet, 3 a day for 3 days then 2 a day for 3 days then 1 a day for 3 days., Disp: 18 tablet, Rfl: 0    Synthroid 75 MCG tablet, Take 1 tablet by mouth Daily. TAKE ONE TABLET DAILY, Disp: 90 tablet, Rfl: 3    Past Medical History:   Diagnosis Date    Abnormal stool test     Acute allergic rhinitis     Acute sinusitis     Allergic rhinitis     Ankle pain     Arthritis of left hip     Atherosclerosis of both carotid arteries     2021: right 16-49%, left with plaque    Atrial ectopy     Boil, thigh     Cataract surgery 2020    CHF (congestive heart failure) spring2021    Chronic constipation     Gastritis     GERD (gastroesophageal reflux disease)     Grade I diastolic dysfunction 06/01/2021    Head ache     Hyperlipidemia     Hypertension     Hypervitaminosis D     Hypothyroidism     Lung nodule     Medicare annual wellness visit, subsequent 02/28/2020    TARI (obstructive sleep apnea)     unable to tolerate CPAP    Osteoarthritis     left hip    Osteopenia     Pessary maintenance     Post-menopause     Premature ventricular contractions     Prolapse of female bladder, acquired     PSVT (paroxysmal supraventricular tachycardia)     Pulmonary hypertension     Severe per echo June 2021    Rectal bleeding     Right foot pain     Urine  frequency     UTI (urinary tract infection)        Past Surgical History:   Procedure Laterality Date    CARDIAC ELECTROPHYSIOLOGY PROCEDURE N/A 6/3/2022    Procedure: Ablation PVC;  Surgeon: Jasbir De La Torre MD;  Location: [facility] INVASIVE LOCATION;  Service: Cardiovascular;  Laterality: N/A;    FOOT SURGERY      TOTAL HIP ARTHROPLASTY         Family History   Problem Relation Age of Onset    Breast cancer Mother     Heart attack Father     Alcohol abuse Sister     Arthritis Sister     Diabetes Sister     Hyperlipidemia Sister     Osteoporosis Sister     Thyroid disease Sister     Alcohol abuse Brother     Hypertension Brother     Hyperlipidemia Brother     Lung cancer Brother     Heart attack Brother     Bipolar disorder Maternal Grandmother     Breast cancer Maternal Grandmother     Depression Maternal Grandmother     Other Other         cardiac disorder, neoplasm of breast    No Known Problems Maternal Grandfather     No Known Problems Paternal Grandmother     No Known Problems Paternal Grandfather        Social History     Tobacco Use    Smoking status: Former     Packs/day: 1.50     Years: 55.00     Additional pack years: 0.00     Total pack years: 82.50     Types: Cigarettes     Start date: 1958     Quit date: 1966     Years since quittin.9    Smokeless tobacco: Never   Substance Use Topics    Alcohol use: Yes     Alcohol/week: 1.0 standard drink of alcohol     Types: 1 Glasses of wine per week     Comment: 7 drinks week/// Caffeine use: 2-3 cups daily            Objective     Vitals:    23 1130   BP: 112/70   Pulse: 80   Resp: 12     Body mass index is 32.79 kg/m².    Physical Exam  Vitals reviewed.   Constitutional:       Appearance: She is well-developed. She is not diaphoretic.   HENT:      Head: Normocephalic and atraumatic.   Eyes:      General: No scleral icterus.     Pupils: Pupils are equal, round, and reactive to light.   Neck:      Thyroid: No thyromegaly.    Cardiovascular:      Rate and Rhythm: Normal rate and regular rhythm.      Heart sounds: No murmur heard.     No friction rub. No gallop.   Pulmonary:      Effort: Pulmonary effort is normal. No respiratory distress.      Breath sounds: No wheezing or rales.   Chest:      Chest wall: No tenderness.   Abdominal:      General: Bowel sounds are normal. There is no distension.      Palpations: Abdomen is soft.      Tenderness: There is no abdominal tenderness.   Musculoskeletal:         General: No deformity. Normal range of motion.   Lymphadenopathy:      Cervical: No cervical adenopathy.   Skin:     General: Skin is warm and dry.      Findings: No rash.   Neurological:      Cranial Nerves: No cranial nerve deficit.      Motor: No abnormal muscle tone.         Lab Results   Component Value Date    GLUCOSE 82 09/11/2023    BUN 16 09/11/2023    CREATININE 0.62 09/11/2023    EGFRIFNONA 87 02/14/2022    EGFRIFAFRI 100 02/14/2022    BCR 25.8 (H) 09/11/2023    K 4.7 09/11/2023    CO2 26.4 09/11/2023    CALCIUM 10.5 09/11/2023    PROTENTOTREF 6.9 09/11/2023    ALBUMIN 4.7 09/11/2023    LABIL2 2.1 09/11/2023    AST 15 09/11/2023    ALT 19 09/11/2023       WBC   Date Value Ref Range Status   06/01/2022 6.61 3.40 - 10.80 10*3/mm3 Final   02/14/2022 8.1 3.4 - 10.8 x10E3/uL Final   09/26/2018 7.47 4.5 - 11.0 10*3/uL Final     RBC   Date Value Ref Range Status   06/01/2022 4.98 3.77 - 5.28 10*6/mm3 Final   02/14/2022 5.26 3.77 - 5.28 x10E6/uL Final   09/26/2018 5.32 (H) 4.0 - 5.2 10*6/uL Final     Hemoglobin   Date Value Ref Range Status   06/01/2022 14.4 12.0 - 15.9 g/dL Final   09/26/2018 15.3 12.0 - 16.0 g/dL Final     Hematocrit   Date Value Ref Range Status   06/01/2022 43.9 34.0 - 46.6 % Final   09/26/2018 47.0 (H) 36.0 - 46.0 % Final     MCV   Date Value Ref Range Status   06/01/2022 88.2 79.0 - 97.0 fL Final   09/26/2018 88.3 80.0 - 100.0 fL Final     MCH   Date Value Ref Range Status   06/01/2022 28.9 26.6 - 33.0 pg  Final   09/26/2018 28.8 26.0 - 34.0 pg Final     MCHC   Date Value Ref Range Status   06/01/2022 32.8 31.5 - 35.7 g/dL Final   09/26/2018 32.6 31.0 - 37.0 g/dL Final     RDW   Date Value Ref Range Status   06/01/2022 12.4 12.3 - 15.4 % Final   09/26/2018 13.4 12.0 - 16.8 % Final     RDW-SD   Date Value Ref Range Status   06/01/2022 39.6 37.0 - 54.0 fl Final     MPV   Date Value Ref Range Status   06/01/2022 11.4 6.0 - 12.0 fL Final   09/26/2018 10.6 6.7 - 10.8 fL Final     Platelets   Date Value Ref Range Status   06/01/2022 198 140 - 450 10*3/mm3 Final   09/26/2018 207 140 - 440 10*3/uL Final     Neutrophil Rel %   Date Value Ref Range Status   09/26/2018 56.1 45 - 80 % Final     Neutrophil %   Date Value Ref Range Status   06/01/2022 57.8 42.7 - 76.0 % Final     Lymphocyte Rel %   Date Value Ref Range Status   09/26/2018 30.3 15 - 50 % Final     Lymphocyte %   Date Value Ref Range Status   06/01/2022 30.3 19.6 - 45.3 % Final     Monocyte Rel %   Date Value Ref Range Status   09/26/2018 9.9 0 - 15 % Final     Monocyte %   Date Value Ref Range Status   06/01/2022 8.3 5.0 - 12.0 % Final     Eosinophil %   Date Value Ref Range Status   06/01/2022 2.4 0.3 - 6.2 % Final   09/26/2018 3.2 0 - 7 % Final     Basophil Rel %   Date Value Ref Range Status   09/26/2018 0.4 0 - 2 % Final     Basophil %   Date Value Ref Range Status   06/01/2022 0.9 0.0 - 1.5 % Final     Immature Grans %   Date Value Ref Range Status   06/01/2022 0.3 0.0 - 0.5 % Final   09/26/2018 0.1 (H) 0 % Final     Neutrophils Absolute   Date Value Ref Range Status   09/26/2018 4.19 2.0 - 8.8 10*3/uL Final     Neutrophils, Absolute   Date Value Ref Range Status   06/01/2022 3.82 1.70 - 7.00 10*3/mm3 Final     Lymphocytes Absolute   Date Value Ref Range Status   09/26/2018 2.26 0.7 - 5.5 10*3/uL Final     Lymphocytes, Absolute   Date Value Ref Range Status   06/01/2022 2.00 0.70 - 3.10 10*3/mm3 Final     Monocytes Absolute   Date Value Ref Range Status  "  09/26/2018 0.74 0.0 - 1.7 10*3/uL Final     Monocytes, Absolute   Date Value Ref Range Status   06/01/2022 0.55 0.10 - 0.90 10*3/mm3 Final     Eosinophils Absolute   Date Value Ref Range Status   09/26/2018 0.24 0.0 - 0.8 10*3/uL Final     Eosinophils, Absolute   Date Value Ref Range Status   06/01/2022 0.16 0.00 - 0.40 10*3/mm3 Final     Basophils Absolute   Date Value Ref Range Status   09/26/2018 0.03 0.0 - 0.2 10*3/uL Final     Basophils, Absolute   Date Value Ref Range Status   06/01/2022 0.06 0.00 - 0.20 10*3/mm3 Final     Immature Grans, Absolute   Date Value Ref Range Status   06/01/2022 0.02 0.00 - 0.05 10*3/mm3 Final   09/26/2018 0.01 <1 10*3/uL Final     nRBC   Date Value Ref Range Status   06/01/2022 0.0 0.0 - 0.2 /100 WBC Final       Lab Results   Component Value Date    HGBA1C 6.00 (H) 09/11/2023       No results found for: \"SFSEWMOJ82\"    TSH   Date Value Ref Range Status   09/11/2023 2.050 0.270 - 4.200 uIU/mL Final       No results found for: \"CHOL\"  Lab Results   Component Value Date    TRIG 282 (H) 09/11/2023     Lab Results   Component Value Date    HDL 49 09/11/2023     Lab Results   Component Value Date     (H) 09/11/2023     Lab Results   Component Value Date    VLDL 53 (H) 09/11/2023     No results found for: \"LDLHDL\"      Procedures    Assessment & Plan   Problems Addressed this Visit       Acute bronchitis due to other specified organisms - Primary     Diagnoses         Codes Comments    Acute bronchitis due to other specified organisms    -  Primary ICD-10-CM: J20.8  ICD-9-CM: 466.0         Postinflammatory bronchitis after viral illness.  Prednisone burst and taper Rx sent.    No orders of the defined types were placed in this encounter.      Current Outpatient Medications   Medication Sig Dispense Refill    diclofenac (VOLTAREN) 75 MG EC tablet Take 1 tablet by mouth 2 (Two) Times a Day. 180 tablet 3    fluticasone (FLONASE) 50 MCG/ACT nasal spray 2 sprays by Each Nare route " "Daily. 16 mL 11    furosemide (Lasix) 20 MG tablet Take 1 tablet by mouth 2 (Two) Times a Day. (Patient taking differently: Take 1 tablet by mouth Daily.) 90 tablet 3    losartan (COZAAR) 50 MG tablet Take 1 tablet by mouth Daily. 90 tablet 3    montelukast (SINGULAIR) 10 MG tablet Take 1 tablet by mouth Every Night. 90 tablet 3    predniSONE (DELTASONE) 20 MG tablet 3 a day for 3 days then 2 a day for 3 days then 1 a day for 3 days. 18 tablet 0    Synthroid 75 MCG tablet Take 1 tablet by mouth Daily. TAKE ONE TABLET DAILY 90 tablet 3     No current facility-administered medications for this visit.       Hillary Crenshaw \"ventura\" had no medications administered during this visit.    No follow-ups on file.    There are no Patient Instructions on file for this visit.       "

## 2024-02-08 ENCOUNTER — TELEPHONE (OUTPATIENT)
Dept: FAMILY MEDICINE CLINIC | Facility: CLINIC | Age: 79
End: 2024-02-08

## 2024-02-08 NOTE — TELEPHONE ENCOUNTER
Patient stated that provider knows about the pain, and would like to start PT to see if this would help with the pain. She states that the arm pain just started and she believes that there is a nerve issue.

## 2024-02-08 NOTE — TELEPHONE ENCOUNTER
Caller: Hillary Crenshaw    Relationship: Self    Best call back number:[phone]     What is the best time to reach you: ANYTIME    Who are you requesting to speak with (clinical staff, provider,  specific staff member): CLINICAL    What was the call regarding: PATIENT CALLING WANTING TO KNOW IF  COULD REFER HER TO PHYSICAL THERAPY. SHE STATED THAT SHE IS HAVING PAIN IN HER ARM AND LOWER BACK.    Is it okay if the provider responds through MyChart: NO

## 2024-02-09 ENCOUNTER — HOSPITAL ENCOUNTER (OUTPATIENT)
Dept: CARDIOLOGY | Facility: HOSPITAL | Age: 79
Discharge: HOME | End: 2024-02-09

## 2024-02-09 VITALS
WEIGHT: 192 LBS | HEART RATE: 60 BPM | BODY MASS INDEX: 34.02 KG/M2 | HEIGHT: 63 IN | DIASTOLIC BLOOD PRESSURE: 84 MMHG | SYSTOLIC BLOOD PRESSURE: 149 MMHG

## 2024-02-09 DIAGNOSIS — Z13.9 VISIT FOR SCREENING: ICD-10-CM

## 2024-02-09 LAB
BH CV ECHO MEAS - DIST AO DIAM: 1.48 CM
BH CV VAS BP LEFT ARM: NORMAL MMHG
BH CV VAS BP RIGHT ARM: NORMAL MMHG
BH CV VAS SCREENING CAROTID CCA LEFT: NORMAL CM/SEC
BH CV VAS SCREENING CAROTID CCA RIGHT: NORMAL CM/SEC
BH CV VAS SCREENING CAROTID ICA LEFT: NORMAL CM/SEC
BH CV VAS SCREENING CAROTID ICA RIGHT: NORMAL CM/SEC
BH CV XLRA MEAS - MID AO DIAM: NORMAL CM
BH CV XLRA MEAS - PAD LEFT ABI DP: 1.1
BH CV XLRA MEAS - PAD LEFT ABI PT: 1.22
BH CV XLRA MEAS - PAD LEFT ARM: 147 MMHG
BH CV XLRA MEAS - PAD LEFT LEG DP: 165 MMHG
BH CV XLRA MEAS - PAD LEFT LEG PT: 183 MMHG
BH CV XLRA MEAS - PAD RIGHT ABI DP: 1.1
BH CV XLRA MEAS - PAD RIGHT ABI PT: 1.2
BH CV XLRA MEAS - PAD RIGHT ARM: 149 MMHG
BH CV XLRA MEAS - PAD RIGHT LEG DP: 165 MMHG
BH CV XLRA MEAS - PAD RIGHT LEG PT: 179 MMHG
BH CV XLRA MEAS - PROX AO DIAM: 1.89 CM
BH CV XLRA MEAS LEFT ICA/CCA RATIO: 1.02
BH CV XLRA MEAS LEFT PROX ECA PSV: NORMAL CM/SEC
BH CV XLRA MEAS RIGHT ICA/CCA RATIO: 2
BH CV XLRA MEAS RIGHT PROX ECA PSV: NORMAL CM/SEC
MAXIMAL PREDICTED HEART RATE: 142 BPM
STRESS TARGET HR: 121 BPM

## 2024-02-09 PROCEDURE — 93799 UNLISTED CV SVC/PROCEDURE: CPT

## 2024-02-09 NOTE — PROGRESS NOTES
I am covering Dr. Mayberry's in basket today because she is out of the office.     Please call patient.  Vascular screening showed carotid artery stenosis less than 50% bilaterally.  No abdominal aortic aneurysm, and normal lower extremity circulation.  I would recommend aspirin 81 mg daily if she can tolerate this.  She would also benefit from starting statin therapy and I would defer this to her PCP.  Her cholesterol was quite elevated in September.

## 2024-02-14 ENCOUNTER — OFFICE VISIT (OUTPATIENT)
Dept: FAMILY MEDICINE CLINIC | Facility: CLINIC | Age: 79
End: 2024-02-14
Payer: MEDICARE

## 2024-02-14 VITALS
WEIGHT: 194.3 LBS | TEMPERATURE: 97.1 F | HEART RATE: 53 BPM | OXYGEN SATURATION: 98 % | BODY MASS INDEX: 34.43 KG/M2 | SYSTOLIC BLOOD PRESSURE: 158 MMHG | HEIGHT: 63 IN | DIASTOLIC BLOOD PRESSURE: 90 MMHG

## 2024-02-14 DIAGNOSIS — E03.8 OTHER SPECIFIED HYPOTHYROIDISM: ICD-10-CM

## 2024-02-14 DIAGNOSIS — M16.10 PRIMARY OSTEOARTHRITIS OF HIP, UNSPECIFIED LATERALITY: ICD-10-CM

## 2024-02-14 DIAGNOSIS — I10 ESSENTIAL HYPERTENSION: ICD-10-CM

## 2024-02-14 DIAGNOSIS — E78.2 MIXED HYPERLIPIDEMIA: Primary | ICD-10-CM

## 2024-02-14 DIAGNOSIS — I51.89 GRADE I DIASTOLIC DYSFUNCTION: ICD-10-CM

## 2024-02-14 DIAGNOSIS — S46.211A BICEPS STRAIN, RIGHT, INITIAL ENCOUNTER: ICD-10-CM

## 2024-02-14 DIAGNOSIS — Z23 IMMUNIZATION DUE: ICD-10-CM

## 2024-02-14 DIAGNOSIS — R73.03 PREDIABETES: ICD-10-CM

## 2024-02-14 DIAGNOSIS — I48.91 ATRIAL FIBRILLATION, UNSPECIFIED TYPE: ICD-10-CM

## 2024-02-14 DIAGNOSIS — J30.1 SEASONAL ALLERGIC RHINITIS DUE TO POLLEN: ICD-10-CM

## 2024-02-14 RX ORDER — AMOXICILLIN 500 MG/1
CAPSULE ORAL
COMMUNITY
Start: 2023-11-19 | End: 2024-02-14

## 2024-02-14 RX ORDER — ATORVASTATIN CALCIUM 10 MG/1
10 TABLET, FILM COATED ORAL DAILY
Qty: 90 TABLET | Refills: 3 | Status: SHIPPED | OUTPATIENT
Start: 2024-02-14

## 2024-02-15 LAB
ALBUMIN SERPL-MCNC: 4.2 G/DL (ref 3.5–5.2)
ALBUMIN/GLOB SERPL: 1.8 G/DL
ALP SERPL-CCNC: 143 U/L (ref 39–117)
ALT SERPL-CCNC: 18 U/L (ref 1–33)
AST SERPL-CCNC: 17 U/L (ref 1–32)
BILIRUB SERPL-MCNC: 0.4 MG/DL (ref 0–1.2)
BUN SERPL-MCNC: 12 MG/DL (ref 8–23)
BUN/CREAT SERPL: 18.5 (ref 7–25)
CALCIUM SERPL-MCNC: 10 MG/DL (ref 8.6–10.5)
CHLORIDE SERPL-SCNC: 105 MMOL/L (ref 98–107)
CHOLEST SERPL-MCNC: 210 MG/DL (ref 0–200)
CO2 SERPL-SCNC: 22.3 MMOL/L (ref 22–29)
CREAT SERPL-MCNC: 0.65 MG/DL (ref 0.57–1)
EGFRCR SERPLBLD CKD-EPI 2021: 90.2 ML/MIN/1.73
GLOBULIN SER CALC-MCNC: 2.4 GM/DL
GLUCOSE SERPL-MCNC: 93 MG/DL (ref 65–99)
HBA1C MFR BLD: 5.9 % (ref 4.8–5.6)
HDLC SERPL-MCNC: 52 MG/DL (ref 40–60)
LDLC SERPL CALC-MCNC: 130 MG/DL (ref 0–100)
POTASSIUM SERPL-SCNC: 4.8 MMOL/L (ref 3.5–5.2)
PROT SERPL-MCNC: 6.6 G/DL (ref 6–8.5)
SODIUM SERPL-SCNC: 138 MMOL/L (ref 136–145)
TRIGL SERPL-MCNC: 155 MG/DL (ref 0–150)
TSH SERPL DL<=0.005 MIU/L-ACNC: 2.11 UIU/ML (ref 0.27–4.2)
VLDLC SERPL CALC-MCNC: 28 MG/DL (ref 5–40)

## 2024-03-06 DIAGNOSIS — I51.89 GRADE I DIASTOLIC DYSFUNCTION: ICD-10-CM

## 2024-03-06 RX ORDER — FUROSEMIDE 20 MG/1
20 TABLET ORAL 2 TIMES DAILY
Qty: 180 TABLET | Refills: 0 | Status: SHIPPED | OUTPATIENT
Start: 2024-03-06

## 2024-03-11 ENCOUNTER — TELEPHONE (OUTPATIENT)
Dept: FAMILY MEDICINE CLINIC | Facility: CLINIC | Age: 79
End: 2024-03-11

## 2024-03-11 DIAGNOSIS — M16.12 ARTHRITIS OF LEFT HIP: ICD-10-CM

## 2024-03-11 DIAGNOSIS — M54.50 CHRONIC LOW BACK PAIN, UNSPECIFIED BACK PAIN LATERALITY, UNSPECIFIED WHETHER SCIATICA PRESENT: ICD-10-CM

## 2024-03-11 DIAGNOSIS — M16.10 PRIMARY OSTEOARTHRITIS OF HIP, UNSPECIFIED LATERALITY: Primary | ICD-10-CM

## 2024-03-11 DIAGNOSIS — G89.29 CHRONIC LOW BACK PAIN, UNSPECIFIED BACK PAIN LATERALITY, UNSPECIFIED WHETHER SCIATICA PRESENT: ICD-10-CM

## 2024-03-11 NOTE — TELEPHONE ENCOUNTER
Caller: Hillary Crenshaw    Relationship: Self    Best call back number: [phone]    What is the medical concern/diagnosis: HIP AND LOWER BACK PAIN    What specialty or service is being requested: ORTHOPEDIC THERAPY     What is the provider, practice or medical service name: [facility] - PATIENT IS CURRENTLY BEING SEEN BY ONE ALREADY FOR HER ARM BUT NEEDS IT FOR HER HIP AND LOWER BACK     Any additional details: PLEASE CALL PATIENT TO ADVISE.

## 2024-05-08 ENCOUNTER — TRANSCRIBE ORDERS (OUTPATIENT)
Dept: ADMINISTRATIVE | Facility: HOSPITAL | Age: 79
End: 2024-05-08
Payer: MEDICARE

## 2024-05-08 DIAGNOSIS — Z12.31 VISIT FOR SCREENING MAMMOGRAM: Primary | ICD-10-CM

## 2024-05-21 ENCOUNTER — HOSPITAL ENCOUNTER (OUTPATIENT)
Dept: MAMMOGRAPHY | Facility: HOSPITAL | Age: 79
Discharge: HOME | End: 2024-05-21
Admitting: FAMILY MEDICINE
Payer: MEDICARE

## 2024-05-21 DIAGNOSIS — Z12.31 VISIT FOR SCREENING MAMMOGRAM: ICD-10-CM

## 2024-05-21 PROCEDURE — 77063 BREAST TOMOSYNTHESIS BI: CPT

## 2024-05-21 PROCEDURE — 77067 SCR MAMMO BI INCL CAD: CPT

## 2024-06-03 NOTE — TELEPHONE ENCOUNTER
Sol-please arrange a walking Myoview stress test.  She also needs an appointment to see our EP MD-Dr. Jasbir De La Torre or Dr. Esteban Rodriges in the near future for PVC burden.  Thank you     Electrophysiology Brief Post-Op Note      PRE-OP DIAGNOSIS: AF    POST-OP DIAGNOSIS: AF    PROCEDURE: AVN ablation    Vendor Representative was present for clinical support.    Physician: Harsha  Assistant: None    ANESTHESIA TYPE:  [  ]General Anesthesia  [ X ] Sedation  [ X ] Local/Regional    ESTIMATED BLOOD LOSS:  10     mL    CONDITION  [  ] Critical  [  ] Serious  [  ]Fair  [  X]Good      SPECIMENS REMOVED (IF APPLICABLE):  none    IMPLANTS (IF APPLICABLE)  none    FINDINGS: none    COMPLICATIONS: none     PLAN OF CARE    -	Start Xarelto 15 mg qd tonight at 8 pm  -           device set at 90 bpm  -	Bed rest till am  -	Admit to telemetry

## 2024-06-18 ENCOUNTER — TELEPHONE (OUTPATIENT)
Dept: FAMILY MEDICINE CLINIC | Facility: CLINIC | Age: 79
End: 2024-06-18

## 2024-06-18 NOTE — TELEPHONE ENCOUNTER
Caller: Hillary Crenshaw    Relationship to patient: Self    Best call back number:      Patient is needing: PATIENT IS ASKING FOR DR. VORA TO SEND AN ORDER FOR PHYSICAL THERAPY TO [facility] FOR HER TO RECEIVE PHYSICAL THERAPY TO HER LOWER BACK, RIGHT SIDE, KNEE AND LEG.   PLEASE LET THE PATIENT KNOW WHEN THE ORDER HAS BEEN SENT.

## 2024-06-19 DIAGNOSIS — M54.50 ACUTE LOW BACK PAIN, UNSPECIFIED BACK PAIN LATERALITY, UNSPECIFIED WHETHER SCIATICA PRESENT: ICD-10-CM

## 2024-06-19 DIAGNOSIS — M17.10 PRIMARY OSTEOARTHRITIS OF KNEE, UNSPECIFIED LATERALITY: Primary | ICD-10-CM

## 2024-08-05 ENCOUNTER — OFFICE VISIT (OUTPATIENT)
Dept: FAMILY MEDICINE CLINIC | Facility: CLINIC | Age: 79
End: 2024-08-05
Payer: MEDICARE

## 2024-08-05 VITALS
HEIGHT: 63 IN | TEMPERATURE: 97.5 F | SYSTOLIC BLOOD PRESSURE: 120 MMHG | BODY MASS INDEX: 32.39 KG/M2 | WEIGHT: 182.8 LBS | DIASTOLIC BLOOD PRESSURE: 84 MMHG | HEART RATE: 55 BPM | OXYGEN SATURATION: 95 %

## 2024-08-05 DIAGNOSIS — Z00.00 MEDICARE ANNUAL WELLNESS VISIT, SUBSEQUENT: Primary | ICD-10-CM

## 2024-08-05 DIAGNOSIS — I48.91 ATRIAL FIBRILLATION, UNSPECIFIED TYPE: ICD-10-CM

## 2024-08-05 DIAGNOSIS — M16.12 ARTHRITIS OF LEFT HIP: ICD-10-CM

## 2024-08-05 DIAGNOSIS — I10 ESSENTIAL HYPERTENSION: ICD-10-CM

## 2024-08-05 DIAGNOSIS — E03.8 OTHER SPECIFIED HYPOTHYROIDISM: ICD-10-CM

## 2024-08-05 DIAGNOSIS — M16.10 PRIMARY OSTEOARTHRITIS OF HIP, UNSPECIFIED LATERALITY: ICD-10-CM

## 2024-08-05 DIAGNOSIS — R73.03 PREDIABETES: ICD-10-CM

## 2024-08-05 DIAGNOSIS — I51.89 GRADE I DIASTOLIC DYSFUNCTION: ICD-10-CM

## 2024-08-05 DIAGNOSIS — E78.2 MIXED HYPERLIPIDEMIA: ICD-10-CM

## 2024-08-05 DIAGNOSIS — J30.1 SEASONAL ALLERGIC RHINITIS DUE TO POLLEN: ICD-10-CM

## 2024-08-05 DIAGNOSIS — I65.23 ATHEROSCLEROSIS OF BOTH CAROTID ARTERIES: ICD-10-CM

## 2024-08-05 PROCEDURE — 1170F FXNL STATUS ASSESSED: CPT | Performed by: FAMILY MEDICINE

## 2024-08-05 PROCEDURE — 1160F RVW MEDS BY RX/DR IN RCRD: CPT | Performed by: FAMILY MEDICINE

## 2024-08-05 PROCEDURE — G0439 PPPS, SUBSEQ VISIT: HCPCS | Performed by: FAMILY MEDICINE

## 2024-08-05 PROCEDURE — 3074F SYST BP LT 130 MM HG: CPT | Performed by: FAMILY MEDICINE

## 2024-08-05 PROCEDURE — 99214 OFFICE O/P EST MOD 30 MIN: CPT | Performed by: FAMILY MEDICINE

## 2024-08-05 PROCEDURE — 1126F AMNT PAIN NOTED NONE PRSNT: CPT | Performed by: FAMILY MEDICINE

## 2024-08-05 PROCEDURE — 3079F DIAST BP 80-89 MM HG: CPT | Performed by: FAMILY MEDICINE

## 2024-08-05 PROCEDURE — 1159F MED LIST DOCD IN RCRD: CPT | Performed by: FAMILY MEDICINE

## 2024-08-05 RX ORDER — DICLOFENAC SODIUM 75 MG/1
75 TABLET, DELAYED RELEASE ORAL 2 TIMES DAILY
Qty: 180 TABLET | Refills: 3 | Status: SHIPPED | OUTPATIENT
Start: 2024-08-05

## 2024-08-05 NOTE — PROGRESS NOTES
Subjective   The ABCs of the Annual Wellness Visit  Medicare Wellness Visit      Hillary Crenshaw is a 78 y.o. patient who presents for a Medicare Wellness Visit.    The following portions of the patient's history were reviewed and   updated as appropriate: allergies, current medications, past family history, past medical history, past social history, past surgical history, and problem list.    Compared to one year ago, the patient's physical   health is the same.  Compared to one year ago, the patient's mental   health is the same.    Recent Hospitalizations:  She was not admitted to the hospital during the last year.     Current Medical Providers:  Patient Care Team:  Laura Muñoz MD as PCP - General (Family Medicine)  Jeyson Mayberry MD as Consulting Physician (Cardiology)    Outpatient Medications Prior to Visit   Medication Sig Dispense Refill    fluticasone (FLONASE) 50 MCG/ACT nasal spray 2 sprays by Each Nare route Daily. 16 mL 11    furosemide (LASIX) 20 MG tablet TAKE 1 TABLET BY MOUTH TWICE A  tablet 0    losartan (COZAAR) 50 MG tablet Take 1 tablet by mouth Daily. 90 tablet 3    montelukast (SINGULAIR) 10 MG tablet Take 1 tablet by mouth Every Night. 90 tablet 3    Synthroid 75 MCG tablet Take 1 tablet by mouth Daily. TAKE ONE TABLET DAILY 90 tablet 3    atorvastatin (LIPITOR) 10 MG tablet Take 1 tablet by mouth Daily. 90 tablet 3    diclofenac (VOLTAREN) 75 MG EC tablet Take 1 tablet by mouth 2 (Two) Times a Day. 180 tablet 3     No facility-administered medications prior to visit.     No opioid medication identified on active medication list. I have reviewed chart for other potential  high risk medication/s and harmful drug interactions in the elderly.      Aspirin is not on active medication list.  Aspirin use is not indicated based on review of current medical condition/s. Risk of harm outweighs potential benefits.  .    Patient Active Problem List   Diagnosis    Hypothyroidism     "Incomplete uterovaginal prolapse    Midline cystocele    Mixed stress and urge urinary incontinence    Obesity    Rectocele    Allergic rhinitis    Atrial fibrillation    Chronic constipation    GERD (gastroesophageal reflux disease)    Osteoarthritis    Osteopenia    Pessary maintenance    Post-menopause    Prolapse of female bladder, acquired    Endometrial mass    Mixed hyperlipidemia    Medicare annual wellness visit, subsequent    Thyroid cyst    Atherosclerosis of both carotid arteries    Premature ventricular contractions    Atrial ectopy    Essential hypertension    Grade I diastolic dysfunction    Abnormal CBC    Falls    Acute bronchitis due to other specified organisms    Prediabetes    Biceps strain, right, initial encounter     Advance Care Planning (Click this link to access ACP Navigator)  Advance Directive is not on file.  ACP discussion was held with the patient during this visit. Patient has an advance directive (not in EMR), copy requested.        Objective   Vitals:    24 1041   BP: 120/84   BP Location: Left arm   Patient Position: Sitting   Cuff Size: Adult   Pulse: 55   Temp: 97.5 °F (36.4 °C)   SpO2: 95%   Weight: 82.9 kg (182 lb 12.8 oz)   Height: 158.8 cm (62.52\")   PainSc: 0-No pain       Estimated body mass index is 32.88 kg/m² as calculated from the following:    Height as of this encounter: 158.8 cm (62.52\").    Weight as of this encounter: 82.9 kg (182 lb 12.8 oz).             Does the patient have evidence of cognitive impairment? No                                                                                                Health  Risk Assessment    Smoking Status:  Social History     Tobacco Use   Smoking Status Former    Current packs/day: 0.00    Average packs/day: 1.5 packs/day for 55.0 years (82.5 ttl pk-yrs)    Types: Cigarettes    Start date: 1958    Quit date: 1966    Years since quittin.6   Smokeless Tobacco Never     Alcohol Consumption:  Social " History     Substance and Sexual Activity   Alcohol Use Yes    Alcohol/week: 1.0 standard drink of alcohol    Types: 1 Glasses of wine per week    Comment: 7 drinks week/// Caffeine use: 2-3 cups daily     Fall Risk Screen:  MAGALI Fall Risk Assessment was completed, and patient is at LOW risk for falls.Assessment completed on:2024    Depression Screenin/5/2024    10:00 AM   PHQ-2/PHQ-9 Depression Screening   Little Interest or Pleasure in Doing Things 0-->not at all   Feeling Down, Depressed or Hopeless 0-->not at all   PHQ-9: Brief Depression Severity Measure Score 0     Health Habits and Functional and Cognitive Screenin/5/2024    10:00 AM   Functional & Cognitive Status   Do you have difficulty preparing food and eating? No   Do you have difficulty bathing yourself, getting dressed or grooming yourself? No   Do you have difficulty using the toilet? No   Do you have difficulty moving around from place to place? No   Do you have trouble with steps or getting out of a bed or a chair? No   Current Diet Well Balanced Diet   Dental Exam Up to date   Eye Exam Up to date   Exercise (times per week) 3 times per week   Current Exercises Include Stationary Bicycling/Spin Class;Swim Aerobics Class   Do you need help using the phone?  No   Are you deaf or do you have serious difficulty hearing?  No   Do you need help to go to places out of walking distance? No   Do you need help shopping? No   Do you need help preparing meals?  No   Do you need help with housework?  No   Do you need help with laundry? No   Do you need help taking your medications? No   Do you need help managing money? No   Do you ever drive or ride in a car without wearing a seat belt? No   Have you felt unusual stress, anger or loneliness in the last month? Yes   Who do you live with? Spouse   If you need help, do you have trouble finding someone available to you? No   Have you been bothered in the last four weeks by sexual problems?  No   Do you have difficulty concentrating, remembering or making decisions? No             Age-appropriate Screening Schedule:  Refer to the list below for future screening recommendations based on patient's age, sex and/or medical conditions. Orders for these recommended tests are listed in the plan section. The patient has been provided with a written plan.    Health Maintenance List  Health Maintenance   Topic Date Due    ZOSTER VACCINE (1 of 2) Never done    RSV Vaccine - Adults (1 - 1-dose 60+ series) Never done    COVID-19 Vaccine (4 - 2023-24 season) 09/01/2023    INFLUENZA VACCINE  08/01/2024    LIPID PANEL  02/14/2025    BMI FOLLOWUP  06/19/2025    DXA SCAN  07/03/2025    ANNUAL WELLNESS VISIT  08/05/2025    COLORECTAL CANCER SCREENING  08/21/2027    TDAP/TD VACCINES (2 - Td or Tdap) 08/07/2033    HEPATITIS C SCREENING  Completed    Pneumococcal Vaccine 65+  Completed                                                                                                                                                CMS Preventative Services Quick Reference  Risk Factors Identified During Encounter  None Identified    The above risks/problems have been discussed with the patient.  Pertinent information has been shared with the patient in the After Visit Summary.  An After Visit Summary and PPPS were made available to the patient.    Follow Up:   Next Medicare Wellness visit to be scheduled in 1 year.         Additional E&M Note during same encounter follows:  Patient has additional, significant, and separately identifiable condition(s)/problem(s) that require work above and beyond the Medicare Wellness Visit     Chief Complaint  Medicare Wellness-subsequent    Subjective   HPI  Hillary is also being seen today for additional medical problem/s.    Review of Systems   Respiratory:  Negative for shortness of breath.    Cardiovascular:  Negative for chest pain.        Hypothyroidism- doing well, on meds, Constipation  "resolved. Has worked on weight loss.      htn- doing well on meds     Hyperlipidemia-  Is not on medication. Has declined this. Working on diet. Cardiology put her on atorvastatin. She quit taking it 2/2 SE.      Allergies- stable on meds, better on singulair.      Afib/atherosclerosis/chf- stable and following with cardiology. Skipping some beats and going to an electrophysiologist. He wants to operate and she has declined this.      OA- stable on meds, hip doing well with replacement, follows with ortho     pulm htn-  recent echo ok and pt declines to see pulm.       Objective   Vital Signs:  /84 (BP Location: Left arm, Patient Position: Sitting, Cuff Size: Adult)   Pulse 55   Temp 97.5 °F (36.4 °C)   Ht 158.8 cm (62.52\")   Wt 82.9 kg (182 lb 12.8 oz)   SpO2 95%   BMI 32.88 kg/m²   Physical Exam  Constitutional:       Appearance: Normal appearance. She is well-developed.   Cardiovascular:      Rate and Rhythm: Normal rate and regular rhythm.      Heart sounds: Normal heart sounds.   Pulmonary:      Effort: Pulmonary effort is normal.      Breath sounds: Normal breath sounds.   Musculoskeletal:         General: No swelling. Normal range of motion.   Skin:     General: Skin is warm and dry.      Findings: No rash.   Neurological:      General: No focal deficit present.      Mental Status: She is alert and oriented to person, place, and time.   Psychiatric:         Mood and Affect: Mood normal.         Behavior: Behavior normal.                 Assessment and Plan               Arthritis of left hip    Medicare annual wellness visit, subsequent    Atrial fibrillation, unspecified type    Mixed hyperlipidemia     Atherosclerosis of both carotid arteries    Essential hypertension    Grade I diastolic dysfunction    Other specified hypothyroidism    Seasonal allergic rhinitis due to pollen    Prediabetes    Primary osteoarthritis of hip, unspecified laterality      Orders Placed This Encounter   Procedures "    Comprehensive Metabolic Panel     Order Specific Question:   Release to patient     Answer:   Routine Release [[id number]]    Lipid Panel     Order Specific Question:   Release to patient     Answer:   Routine Release [[id number]]    TSH Rfx On Abnormal To Free T4     Order Specific Question:   Release to patient     Answer:   Routine Release [[id number]]    Hemoglobin A1c     Order Specific Question:   Release to patient     Answer:   Routine Release [[id number]]    CBC & Differential     Order Specific Question:   Manual Differential     Answer:   No     Order Specific Question:   Release to patient     Answer:   Routine Release [[id number]]     New Medications Ordered This Visit   Medications    diclofenac (VOLTAREN) 75 MG EC tablet     Sig: Take 1 tablet by mouth 2 (Two) Times a Day.     Dispense:  180 tablet     Refill:  3          Follow Up   Return in about 6 months (around 2/5/2025) for Recheck.  Patient was given instructions and counseling regarding her condition or for health maintenance advice. Please see specific information pulled into the AVS if appropriate.    Discussed risk factors, cont meds, f/u in 6 months.

## 2024-08-06 LAB
ALBUMIN SERPL-MCNC: 4.2 G/DL (ref 3.8–4.8)
ALP SERPL-CCNC: 166 IU/L (ref 44–121)
ALT SERPL-CCNC: 14 IU/L (ref 0–32)
AST SERPL-CCNC: 18 IU/L (ref 0–40)
BASOPHILS # BLD AUTO: 0.1 X10E3/UL (ref 0–0.2)
BASOPHILS NFR BLD AUTO: 1 %
BILIRUB SERPL-MCNC: 0.4 MG/DL (ref 0–1.2)
BUN SERPL-MCNC: 14 MG/DL (ref 8–27)
BUN/CREAT SERPL: 22 (ref 12–28)
CALCIUM SERPL-MCNC: 9.8 MG/DL (ref 8.7–10.3)
CHLORIDE SERPL-SCNC: 104 MMOL/L (ref 96–106)
CHOLEST SERPL-MCNC: 214 MG/DL (ref 100–199)
CO2 SERPL-SCNC: 22 MMOL/L (ref 20–29)
CREAT SERPL-MCNC: 0.65 MG/DL (ref 0.57–1)
EGFRCR SERPLBLD CKD-EPI 2021: 90 ML/MIN/1.73
EOSINOPHIL # BLD AUTO: 0.2 X10E3/UL (ref 0–0.4)
EOSINOPHIL NFR BLD AUTO: 3 %
ERYTHROCYTE [DISTWIDTH] IN BLOOD BY AUTOMATED COUNT: 12.6 % (ref 11.7–15.4)
GLOBULIN SER CALC-MCNC: 2.5 G/DL (ref 1.5–4.5)
GLUCOSE SERPL-MCNC: 92 MG/DL (ref 70–99)
HBA1C MFR BLD: 6 % (ref 4.8–5.6)
HCT VFR BLD AUTO: 45.2 % (ref 34–46.6)
HDLC SERPL-MCNC: 50 MG/DL
HGB BLD-MCNC: 14.4 G/DL (ref 11.1–15.9)
IMM GRANULOCYTES # BLD AUTO: 0 X10E3/UL (ref 0–0.1)
IMM GRANULOCYTES NFR BLD AUTO: 0 %
LDLC SERPL CALC-MCNC: 139 MG/DL (ref 0–99)
LYMPHOCYTES # BLD AUTO: 1.9 X10E3/UL (ref 0.7–3.1)
LYMPHOCYTES NFR BLD AUTO: 25 %
MCH RBC QN AUTO: 28 PG (ref 26.6–33)
MCHC RBC AUTO-ENTMCNC: 31.9 G/DL (ref 31.5–35.7)
MCV RBC AUTO: 88 FL (ref 79–97)
MONOCYTES # BLD AUTO: 0.8 X10E3/UL (ref 0.1–0.9)
MONOCYTES NFR BLD AUTO: 10 %
NEUTROPHILS # BLD AUTO: 4.5 X10E3/UL (ref 1.4–7)
NEUTROPHILS NFR BLD AUTO: 61 %
PLATELET # BLD AUTO: 226 X10E3/UL (ref 150–450)
POTASSIUM SERPL-SCNC: 4.6 MMOL/L (ref 3.5–5.2)
PROT SERPL-MCNC: 6.7 G/DL (ref 6–8.5)
RBC # BLD AUTO: 5.15 X10E6/UL (ref 3.77–5.28)
SODIUM SERPL-SCNC: 140 MMOL/L (ref 134–144)
TRIGL SERPL-MCNC: 140 MG/DL (ref 0–149)
TSH SERPL DL<=0.005 MIU/L-ACNC: 1.56 UIU/ML (ref 0.45–4.5)
VLDLC SERPL CALC-MCNC: 25 MG/DL (ref 5–40)
WBC # BLD AUTO: 7.4 X10E3/UL (ref 3.4–10.8)

## 2024-08-13 ENCOUNTER — TELEPHONE (OUTPATIENT)
Dept: FAMILY MEDICINE CLINIC | Facility: CLINIC | Age: 79
End: 2024-08-13
Payer: MEDICARE

## 2024-08-13 NOTE — TELEPHONE ENCOUNTER
Caller: Hillary Crenshaw    Relationship to patient: Self    Patient is needing: PATIENT WOULD LIKE TO DISCUSS HER LAB RESULTS AND IS REQUESTING A CALL BACK  [phone]

## 2024-08-13 NOTE — TELEPHONE ENCOUNTER
Discussed with patient, went over the results and she still wants to continue exercise and diet right now and if she decides she wants medication she'll let provider know.

## 2024-08-13 NOTE — TELEPHONE ENCOUNTER
Tried to contact pt to discuss labs.  It appears she spoke valentine Benitez on 8/7/24 re: labs.  LM to call back if she has further questions.    [facility]

## 2024-08-20 ENCOUNTER — TELEPHONE (OUTPATIENT)
Dept: FAMILY MEDICINE CLINIC | Facility: CLINIC | Age: 79
End: 2024-08-20

## 2024-10-02 DIAGNOSIS — I10 ESSENTIAL HYPERTENSION: ICD-10-CM

## 2024-10-03 RX ORDER — LOSARTAN POTASSIUM 50 MG/1
50 TABLET ORAL DAILY
Qty: 90 TABLET | Refills: 3 | Status: SHIPPED | OUTPATIENT
Start: 2024-10-03

## 2024-10-03 NOTE — TELEPHONE ENCOUNTER
LOV                   8/5/2024  NOV                   2/3/2025  Last refill             9/11/23  Protocol              met

## 2024-10-21 RX ORDER — MONTELUKAST SODIUM 10 MG/1
10 TABLET ORAL NIGHTLY
Qty: 90 TABLET | Refills: 3 | Status: SHIPPED | OUTPATIENT
Start: 2024-10-21

## 2024-10-22 ENCOUNTER — CLINICAL SUPPORT (OUTPATIENT)
Dept: FAMILY MEDICINE CLINIC | Facility: CLINIC | Age: 79
End: 2024-10-22
Payer: MEDICARE

## 2024-10-22 DIAGNOSIS — Z23 IMMUNIZATION DUE: Primary | ICD-10-CM

## 2024-10-22 PROCEDURE — G0008 ADMIN INFLUENZA VIRUS VAC: HCPCS | Performed by: FAMILY MEDICINE

## 2024-10-22 PROCEDURE — 90662 IIV NO PRSV INCREASED AG IM: CPT | Performed by: FAMILY MEDICINE

## 2024-11-11 ENCOUNTER — OFFICE VISIT (OUTPATIENT)
Dept: CARDIOLOGY | Facility: CLINIC | Age: 79
End: 2024-11-11
Payer: MEDICARE

## 2024-11-11 VITALS
BODY MASS INDEX: 33.49 KG/M2 | DIASTOLIC BLOOD PRESSURE: 80 MMHG | HEART RATE: 52 BPM | OXYGEN SATURATION: 96 % | SYSTOLIC BLOOD PRESSURE: 124 MMHG | HEIGHT: 62 IN | WEIGHT: 182 LBS

## 2024-11-11 DIAGNOSIS — G89.29 CHRONIC BILATERAL THORACIC BACK PAIN: ICD-10-CM

## 2024-11-11 DIAGNOSIS — I10 ESSENTIAL HYPERTENSION: ICD-10-CM

## 2024-11-11 DIAGNOSIS — M54.6 CHRONIC BILATERAL THORACIC BACK PAIN: ICD-10-CM

## 2024-11-11 DIAGNOSIS — R00.1 BRADYCARDIA, SINUS: ICD-10-CM

## 2024-11-11 DIAGNOSIS — I49.3 PREMATURE VENTRICULAR CONTRACTIONS: Primary | ICD-10-CM

## 2024-11-11 DIAGNOSIS — E78.2 MIXED HYPERLIPIDEMIA: ICD-10-CM

## 2024-11-11 DIAGNOSIS — I65.23 ATHEROSCLEROSIS OF BOTH CAROTID ARTERIES: ICD-10-CM

## 2024-11-11 DIAGNOSIS — I51.89 GRADE I DIASTOLIC DYSFUNCTION: ICD-10-CM

## 2024-11-11 PROBLEM — J20.8 ACUTE BRONCHITIS DUE TO OTHER SPECIFIED ORGANISMS: Status: RESOLVED | Noted: 2023-12-05 | Resolved: 2024-11-11

## 2024-11-11 PROCEDURE — 93000 ELECTROCARDIOGRAM COMPLETE: CPT | Performed by: NURSE PRACTITIONER

## 2024-11-11 PROCEDURE — 1159F MED LIST DOCD IN RCRD: CPT | Performed by: NURSE PRACTITIONER

## 2024-11-11 PROCEDURE — 3079F DIAST BP 80-89 MM HG: CPT | Performed by: NURSE PRACTITIONER

## 2024-11-11 PROCEDURE — 99214 OFFICE O/P EST MOD 30 MIN: CPT | Performed by: NURSE PRACTITIONER

## 2024-11-11 PROCEDURE — 1160F RVW MEDS BY RX/DR IN RCRD: CPT | Performed by: NURSE PRACTITIONER

## 2024-11-11 PROCEDURE — 3074F SYST BP LT 130 MM HG: CPT | Performed by: NURSE PRACTITIONER

## 2024-11-11 RX ORDER — ASPIRIN 81 MG/1
81 TABLET ORAL DAILY
Start: 2024-11-11

## 2024-11-11 NOTE — PROGRESS NOTES
Date of Office Visit: 2024  Encounter Provider: JUSTIN Anderson  Place of Service: [facility] CARDIOLOGY  Patient Name: Hillary Crenshaw  :1945  Primary Cardiologist: Dr. Jeyson Mayberry    Chief Complaint   Patient presents with    Annual Exam   :     HPI: Hillary Crenshaw is a 79 y.o. female who presents today for annual cardiac follow-up visit.  I reviewed her medical records.    She has known hypertension, hyperlipidemia, hypothyroidism, and obstructive sleep apnea.    In , she had 2 episodes of syncope. Holter monitor showed normal sinus rhythm with a 22% PVC burden and occasional short bursts of PACs. There was concern that she had atrial fibrillation, but Dr. Mayberry said this was never actually diagnosed and felt that it was more SVT. She was placed on diltiazem.     In , she was having palpitations.  Echocardiogram showed the following: normal LVEF, grade 1 diastolic dysfunction, moderate tricuspid regurgitation, and moderate pulmonary hypertension. 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 She was referred to EP and they recommended a PVC ablation. She decided to hold off on the procedure. She declined sleep apnea evaluation.  Carotid duplex showed mild stenosis bilaterally.    In 2022, she decided to pursue ablation.  She underwent PVC ablation of the RVOT and RV septum.  Follow-up 48-hour Holter monitor showed normal sinus rhythm, average heart rate of 61 bpm, and rare APCs/PVCs.  Echocardiogram showed LVEF 63%, grade 1 diastolic dysfunction, trace MR, mild to moderate TR, and trace MN.  Diltiazem was discontinued due to sinus bradycardia.    In 2024, vascular screening showed the following: Less than 50% carotid artery stenosis, no abdominal aortic aneurysm, and no PAD of lower extremities.  I recommended starting aspirin 81 mg daily and that she discuss with her PCP statin therapy.      In 2024, follow-up lipid panel  was elevated and she wanted to try diet and exercise.    She presents today for a follow-up visit.  She has had 1 episode where she felt a weird sensation in her chest and that has not reoccurred.  She denies chest pain, shortness of air, palpitations, edema, dizziness, or syncope.  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  Blood pressure and heart rate are normal.      Past Medical History:   Diagnosis Date    Abnormal stool test     Acute allergic rhinitis     Acute sinusitis     Allergic rhinitis     Ankle pain     Arthritis of left hip     Atherosclerosis of both carotid arteries     2021: right 16-49%, left with plaque    Atrial ectopy     Boil, thigh     Cataract surgery 2020    CHF (congestive heart failure) spring2021    Chronic constipation     Gastritis     GERD (gastroesophageal reflux disease)     Grade I diastolic dysfunction 06/01/2021    Head ache     Hyperlipidemia     Hypertension     Hypervitaminosis D     Hypothyroidism     Lung nodule     Medicare annual wellness visit, subsequent 02/28/2020    TARI (obstructive sleep apnea)     unable to tolerate CPAP    Osteoarthritis     left hip    Osteopenia     Pessary maintenance     Post-menopause     Premature ventricular contractions     Prolapse of female bladder, acquired     PSVT (paroxysmal supraventricular tachycardia)     Pulmonary hypertension     Severe per echo June 2021    Rectal bleeding     Right foot pain     Urine frequency     UTI (urinary tract infection)        Past Surgical History:   Procedure Laterality Date    CARDIAC ELECTROPHYSIOLOGY PROCEDURE N/A 6/3/2022    Procedure: Ablation PVC;  Surgeon: Jasbir De La Trore MD;  Location: [facility] INVASIVE LOCATION;  Service: Cardiovascular;  Laterality: N/A;    FOOT SURGERY      TOTAL HIP ARTHROPLASTY         Social History     Socioeconomic History    Marital status:    Tobacco Use    Smoking status: Former     Current packs/day:  0.00     Average packs/day: 1.5 packs/day for 55.0 years (82.5 ttl pk-yrs)     Types: Cigarettes     Start date: 1958     Quit date: 1966     Years since quittin.9    Smokeless tobacco: Never   Vaping Use    Vaping status: Never Used   Substance and Sexual Activity    Alcohol use: Yes     Alcohol/week: 1.0 standard drink of alcohol     Types: 1 Glasses of wine per week     Comment: 7 drinks week/// Caffeine use: 2-3 cups daily    Drug use: Never    Sexual activity: Not Currently     Partners: Male     Birth control/protection: None       Family History   Problem Relation Age of Onset    Breast cancer Mother     Heart attack Father     Alcohol abuse Sister     Arthritis Sister     Diabetes Sister     Hyperlipidemia Sister     Osteoporosis Sister     Thyroid disease Sister     Alcohol abuse Brother     Hypertension Brother     Hyperlipidemia Brother     Lung cancer Brother     Heart attack Brother     Bipolar disorder Maternal Grandmother     Breast cancer Maternal Grandmother     Depression Maternal Grandmother     Other Other         cardiac disorder, neoplasm of breast    No Known Problems Maternal Grandfather     No Known Problems Paternal Grandmother     No Known Problems Paternal Grandfather        The following portion of the patient's history were reviewed and updated as appropriate: past medical history, past surgical history, past social history, past family history, allergies, current medications, and problem list.    Review of Systems   Constitutional: Negative.   Cardiovascular: Negative.    Respiratory: Negative.     Musculoskeletal:  Positive for back pain.   Neurological: Negative.        Allergies   Allergen Reactions    Atorvastatin Myalgia         Current Outpatient Medications:     diclofenac (VOLTAREN) 75 MG EC tablet, Take 1 tablet by mouth 2 (Two) Times a Day., Disp: 180 tablet, Rfl: 3    fluticasone (FLONASE) 50 MCG/ACT nasal spray, 2 sprays by Each Nare route Daily., Disp: 16 mL,  "Rfl: 11    furosemide (LASIX) 20 MG tablet, TAKE 1 TABLET BY MOUTH TWICE A DAY, Disp: 180 tablet, Rfl: 0    losartan (COZAAR) 50 MG tablet, TAKE 1 TABLET BY MOUTH DAILY, Disp: 90 tablet, Rfl: 3    montelukast (SINGULAIR) 10 MG tablet, TAKE ONE TABLET BY MOUTH ONCE NIGHTLY, Disp: 90 tablet, Rfl: 3    Synthroid 75 MCG tablet, Take 1 tablet by mouth Daily. TAKE ONE TABLET DAILY, Disp: 90 tablet, Rfl: 3         Objective:     Vitals:    11/11/24 1004   BP: 124/80   BP Location: Right arm   Patient Position: Sitting   Cuff Size: Adult   Pulse: 52   SpO2: 96%   Weight: 82.6 kg (182 lb)   Height: 158.5 cm (62.4\")     Body mass index is 32.86 kg/m².    PHYSICAL EXAM:    Vitals Reviewed.   General Appearance: No acute distress, well developed and well nourished. Obese.   HENT: No hearing loss noted.    Respiratory: No signs of respiratory distress. Respiration rhythm and depth normal.  Clear to auscultation.   Cardiovascular:  Jugular Venous Pressure: Normal  Heart Rate and Rhythm: Normal rhythm.  Bradycardic.  Heart Sounds: Normal S1 and S2. No S3 or S4 noted.  Murmurs: No murmurs noted. No rubs, thrills, or gallops.   Lower Extremities: No edema noted.  Musculoskeletal: Normal movement of extremities.  Skin: General appearance normal.    Psychiatric: Patient alert and oriented to person, place, and time. Speech and behavior appropriate. Normal mood and affect.       ECG 12 Lead    Date/Time: 11/11/2024 10:14 AM  Performed by: Rosemary Mccord APRN    Authorized by: Rosemary Mccord APRN  Comparison: compared with previous ECG from 11/8/2023  Similar to previous ECG  Rhythm: sinus bradycardia  Rate: bradycardic  BPM: 52  Conduction: conduction normal  ST Segments: ST segments normal  T Waves: T waves normal  QRS axis: normal    Clinical impression: normal ECG            Assessment:       Diagnosis Plan   1. Premature ventricular contractions        2. Bradycardia, sinus        3. Essential hypertension        4. Grade I " diastolic dysfunction        5. Atherosclerosis of both carotid arteries        6. Mixed hyperlipidemia        7. Chronic bilateral thoracic back pain               Plan:       1.  Symptomatic PVCs: History of PVC ablation in June 2022.  Denies further palpitations.  Diltiazem was discontinued.    2.  Asymptomatic sinus bradycardia.  She is not on any rate lowering medications.    3.  Hypertension: Blood pressure well-controlled on current medications.    4.  Grade 1 diastolic dysfunction: Euvolemic today.  Takes furosemide.    5.  Carotid artery atherosclerosis: Mild per vascular screening.  Start aspirin 81 mg daily.  She declined statin therapy recently, although statins would be good for plaque stabilization.    6.  Hyperlipidemia: Total cholesterol and LDL elevated.  She is working with her PCP.  She is trying weight loss, diet, and exercise.    7.  Back pain: Low suspicion that this is cardiac.  I recommended follow-up with her PCP.    8.  She will follow-up with Dr. Mayberry in 1 year.    As always, it has been a pleasure to participate in your patient's care. Thank you.         Sincerely,         JUSTIN Graham  [facility] Cardiology      Dictated utilizing Dragon Dictation  I spent 30 minutes reviewing her medical records/testing/previous office notes/labs, face-to-face interaction with patient, physical examination, formulating the plan of care, and discussion of plan of care with patient.

## 2025-01-06 DIAGNOSIS — E03.8 OTHER SPECIFIED HYPOTHYROIDISM: ICD-10-CM

## 2025-01-07 RX ORDER — LEVOTHYROXINE SODIUM 75 MCG
75 TABLET ORAL DAILY
Qty: 90 TABLET | Refills: 3 | Status: SHIPPED | OUTPATIENT
Start: 2025-01-07

## 2025-03-03 ENCOUNTER — PRE-ADMISSION TESTING (OUTPATIENT)
Dept: PREADMISSION TESTING | Facility: HOSPITAL | Age: 80
End: 2025-03-03
Payer: MEDICARE

## 2025-03-03 VITALS
RESPIRATION RATE: 20 BRPM | HEIGHT: 63 IN | WEIGHT: 180.3 LBS | OXYGEN SATURATION: 98 % | TEMPERATURE: 97.4 F | DIASTOLIC BLOOD PRESSURE: 70 MMHG | HEART RATE: 60 BPM | SYSTOLIC BLOOD PRESSURE: 137 MMHG | BODY MASS INDEX: 31.95 KG/M2

## 2025-03-03 LAB
ANION GAP SERPL CALCULATED.3IONS-SCNC: 11 MMOL/L (ref 5–15)
BUN SERPL-MCNC: 14 MG/DL (ref 8–23)
BUN/CREAT SERPL: 20.3 (ref 7–25)
CALCIUM SPEC-SCNC: 9.8 MG/DL (ref 8.6–10.5)
CHLORIDE SERPL-SCNC: 106 MMOL/L (ref 98–107)
CO2 SERPL-SCNC: 24 MMOL/L (ref 22–29)
CREAT SERPL-MCNC: 0.69 MG/DL (ref 0.57–1)
DEPRECATED RDW RBC AUTO: 38.4 FL (ref 37–54)
EGFRCR SERPLBLD CKD-EPI 2021: 88.4 ML/MIN/1.73
ERYTHROCYTE [DISTWIDTH] IN BLOOD BY AUTOMATED COUNT: 12.6 % (ref 12.3–15.4)
GLUCOSE SERPL-MCNC: 103 MG/DL (ref 65–99)
HCT VFR BLD AUTO: 43.8 % (ref 34–46.6)
HGB BLD-MCNC: 14.4 G/DL (ref 12–15.9)
MCH RBC QN AUTO: 27.8 PG (ref 26.6–33)
MCHC RBC AUTO-ENTMCNC: 32.9 G/DL (ref 31.5–35.7)
MCV RBC AUTO: 84.6 FL (ref 79–97)
PLATELET # BLD AUTO: 242 10*3/MM3 (ref 140–450)
PMV BLD AUTO: 10.6 FL (ref 6–12)
POTASSIUM SERPL-SCNC: 4.5 MMOL/L (ref 3.5–5.2)
RBC # BLD AUTO: 5.18 10*6/MM3 (ref 3.77–5.28)
SODIUM SERPL-SCNC: 141 MMOL/L (ref 136–145)
WBC NRBC COR # BLD AUTO: 6.95 10*3/MM3 (ref 3.4–10.8)

## 2025-03-03 PROCEDURE — 80048 BASIC METABOLIC PNL TOTAL CA: CPT

## 2025-03-03 PROCEDURE — 36415 COLL VENOUS BLD VENIPUNCTURE: CPT

## 2025-03-03 PROCEDURE — 85027 COMPLETE CBC AUTOMATED: CPT

## 2025-03-03 RX ORDER — ESTRADIOL 0.1 MG/G
1 CREAM VAGINAL 3 TIMES WEEKLY
COMMUNITY
Start: 2024-11-13

## 2025-03-03 NOTE — DISCHARGE INSTRUCTIONS
Take the following medications the morning of surgery:  SYNTHROID      If you are on prescription narcotic pain medication to control your pain you may also take that medication the morning of surgery.      General Instructions:     Do not eat solid food after midnight the night before surgery.  Clear liquids day of surgery are allowed but must be stopped at least two hours before your hospital arrival time.       Allowed clear liquids      Water, sodas, and tea or coffee with no cream or milk added.       12 to 20 ounces of a clear liquid that contains carbohydrates is recommended.  If non-diabetic, have Gatorade or Powerade.  If diabetic, have G2 or Powerade Zero.     Do not have liquids red in color.  Do not consume chicken, beef, pork or vegetable broth or bouillon cubes of any variety as they are not considered clear liquids and are not allowed.      Infants may have breast milk up to four hours before surgery.  Infants drinking formula may drink formula up to six hours before surgery.   Patients who avoid smoking, chewing tobacco and alcohol for 4 weeks prior to surgery have a reduced risk of post-operative complications.  Quit smoking as many days before surgery as you can.  Do not smoke, use chewing tobacco or drink alcohol the day of surgery.   If applicable bring your C-PAP/ BI-PAP machine in with you to preop day of surgery.  Bring any papers given to you in the doctor’s office.  Wear clean comfortable clothes.  Do not wear contact lenses, false eyelashes or make-up.  Bring a case for your glasses.   Bring crutches or walker if applicable.  Remove all piercings.  Leave jewelry and any other valuables at home.  Hair extensions with metal clips must be removed prior to surgery.  The Pre-Admission Testing nurse will instruct you to bring medications if unable to obtain an accurate list in Pre-Admission Testing.    Day of surgery you will need to let the preoperative nurse know the last time you took each of  your medications.  To ensure a safe environment for patients and staff, we kindly ask that children under the age of 16 not accompany patients.  If you must bring a dependent child or dependent adult please ensure a responsible adult, other than yourself, is present to supervise them.        Preventing a Surgical Site Infection:  For 2 to 3 days before surgery, avoid shaving with a razor because the razor can irritate skin and make it easier to develop an infection.    Any areas of open skin can increase the risk of a post-operative wound infection by allowing bacteria to enter and travel throughout the body.  Notify your surgeon if you have any skin wounds / rashes even if it is not near the expected surgical site.  The area will need assessed to determine if surgery should be delayed until it is healed.  The night prior to surgery shower using a fresh bar of anti-bacterial soap (such as Dial) and clean washcloth.  Sleep in a clean bed with clean clothing.  Do not allow pets to sleep with you.  Shower on the morning of surgery using a fresh bar of anti-bacterial soap (such as Dial) and clean washcloth.  Dry with a clean towel and dress in clean clothing.  Ask your surgeon if you will be receiving antibiotics prior to surgery.  Make sure you, your family, and all healthcare providers clean their hands with soap and water or an alcohol based hand  before caring for you or your wound.    Day of surgery:  Your arrival time is approximately two hours before your scheduled surgery time.  Please note if you have an early arrival time the surgery doors do not open before 5:00 AM.  Upon arrival, a Pre-op nurse and Anesthesiologist will review your health history, obtain vital signs, and answer questions you may have.  The only belongings needed at this time will be a list of your home medications and if applicable your C-PAP/BI-PAP machine.  A Pre-op nurse will start an IV and you may receive medication in  preparation for surgery, including something to help you relax.     Please be aware that surgery does come with discomfort.  We want to make every effort to control your discomfort so please discuss any uncontrolled symptoms with your nurse.   Your doctor will most likely have prescribed pain medications.      If you are going home after surgery you will receive individualized written care instructions before being discharged.  A responsible adult must drive you to and from the hospital on the day of your surgery and ideally stay with you through the night.   .  Discharge prescriptions can be filled by the hospital pharmacy during regular pharmacy hours.  If you are having surgery late in the day/evening your prescription may be e-prescribed to your pharmacy.  Please verify your pharmacy hours or chose a 24 hour pharmacy to avoid not having access to your prescription because your pharmacy has closed for the day.    If you are staying overnight following surgery, you will be transported to your hospital room following the recovery period.  [facility] has all private rooms.    If you have any questions please call Pre-Admission Testing at [phone].  Deductibles and co-payments are collected on the day of service. Please be prepared to pay the required co-pay, deductible or deposit on the day of service as defined by your plan.    Call your surgeon immediately if you experience any of the following symptoms:  Sore Throat  Shortness of Breath or difficulty breathing  Cough  Chills  Body soreness or muscle pain  Headache  Fever  New loss of taste or smell  Do not arrive for your surgery ill.  Your procedure will need to be rescheduled to another time.  You will need to call your physician before the day of surgery to avoid any unnecessary exposure to hospital staff as well as other patients.

## 2025-03-05 ENCOUNTER — OFFICE VISIT (OUTPATIENT)
Dept: FAMILY MEDICINE CLINIC | Facility: CLINIC | Age: 80
End: 2025-03-05
Payer: MEDICARE

## 2025-03-05 VITALS
OXYGEN SATURATION: 98 % | DIASTOLIC BLOOD PRESSURE: 80 MMHG | HEIGHT: 63 IN | WEIGHT: 182.4 LBS | SYSTOLIC BLOOD PRESSURE: 132 MMHG | HEART RATE: 58 BPM | BODY MASS INDEX: 32.32 KG/M2 | TEMPERATURE: 97.7 F

## 2025-03-05 DIAGNOSIS — M16.10 PRIMARY OSTEOARTHRITIS OF HIP, UNSPECIFIED LATERALITY: ICD-10-CM

## 2025-03-05 DIAGNOSIS — E78.2 MIXED HYPERLIPIDEMIA: Primary | ICD-10-CM

## 2025-03-05 DIAGNOSIS — I10 ESSENTIAL HYPERTENSION: ICD-10-CM

## 2025-03-05 DIAGNOSIS — E03.8 OTHER SPECIFIED HYPOTHYROIDISM: ICD-10-CM

## 2025-03-05 DIAGNOSIS — I65.23 ATHEROSCLEROSIS OF BOTH CAROTID ARTERIES: ICD-10-CM

## 2025-03-05 DIAGNOSIS — I51.89 GRADE I DIASTOLIC DYSFUNCTION: ICD-10-CM

## 2025-03-05 DIAGNOSIS — R73.03 PREDIABETES: ICD-10-CM

## 2025-03-05 NOTE — PROGRESS NOTES
"Subjective   Hillary Crenshaw is a 79 y.o. female.     Chief Complaint   Patient presents with    6 month follow up     Hypothyroidism- doing well, on meds, Constipation resolved. Has worked on weight loss. Some fatigue.      htn- doing well on meds     Hyperlipidemia-  Is not on medication. Has declined this. Working on diet. Cardiology put her on atorvastatin. She quit taking it 2/2 SE.      Allergies- stable on meds, better on singulair.      Afib/atherosclerosis/chf- stable and following with cardiology. Skipping some beats and going to an electrophysiologist. He wants to operate and she has declined this. Pt reports cardiology cleared her for surgery.      OA- stable on meds, hip doing well with replacement, follows with ortho     pulm htn-  recent echo ok and pt declines to see pulm.           The following portions of the patient's history were reviewed and updated as appropriate: allergies, current medications, past family history, past medical history, past social history, past surgical history and problem list.    Past Medical History:   Diagnosis Date    Atherosclerosis of both carotid arteries     2021: right 16-49%, left with plaque    Chronic constipation     GERD (gastroesophageal reflux disease)     Grade I diastolic dysfunction 06/01/2021    Hyperlipidemia     Hypertension     Hypervitaminosis D     Hypothyroidism     Lung nodule     PT STATES JUST \"WATCHING\"    TARI (obstructive sleep apnea)     unable to tolerate CPAP    Osteoarthritis     Osteopenia     Pessary maintenance     Premature ventricular contractions     Prolapse of female bladder, acquired     PSVT (paroxysmal supraventricular tachycardia)     Pulmonary hypertension     Severe per echo June 2021    Right ovarian cyst        Past Surgical History:   Procedure Laterality Date    CARDIAC ELECTROPHYSIOLOGY PROCEDURE N/A 06/03/2022    Procedure: Ablation PVC;  Surgeon: Jasbir De La Torre MD;  Location: [facility] INVASIVE LOCATION;  " "Service: Cardiovascular;  Laterality: N/A;    CATARACT EXTRACTION, BILATERAL      COLONOSCOPY      ORIF ANKLE FRACTURE Right 2001    OVARIAN CYST SURGERY Right     TOTAL HIP ARTHROPLASTY Left        Family History   Problem Relation Age of Onset    Breast cancer Mother     Heart attack Father     Alcohol abuse Sister     Arthritis Sister     Diabetes Sister     Hyperlipidemia Sister     Osteoporosis Sister     Thyroid disease Sister     Alcohol abuse Brother     Hypertension Brother     Hyperlipidemia Brother     Lung cancer Brother     Heart attack Brother     Bipolar disorder Maternal Grandmother     Breast cancer Maternal Grandmother     Depression Maternal Grandmother     No Known Problems Maternal Grandfather     No Known Problems Paternal Grandmother     No Known Problems Paternal Grandfather     Malig Hyperthermia Neg Hx        Social History     Socioeconomic History    Marital status:    Tobacco Use    Smoking status: Former     Current packs/day: 0.00     Average packs/day: 1.5 packs/day for 55.0 years (82.5 ttl pk-yrs)     Types: Cigarettes     Start date: 1958     Quit date: 1966     Years since quittin.2    Smokeless tobacco: Never   Vaping Use    Vaping status: Never Used   Substance and Sexual Activity    Alcohol use: Not Currently     Comment: 7 drinks week/// Caffeine use: 2-3 cups daily    Drug use: Never    Sexual activity: Not Currently     Partners: Male     Birth control/protection: None       Review of Systems   Constitutional:  Negative for fever.   Respiratory:  Negative for shortness of breath.        Objective   Visit Vitals  /80 (BP Location: Left arm, Patient Position: Sitting, Cuff Size: Adult)   Pulse 58   Temp 97.7 °F (36.5 °C)   Ht 160 cm (62.99\")   Wt 82.7 kg (182 lb 6.4 oz)   SpO2 98%   BMI 32.32 kg/m²     Body mass index is 32.32 kg/m².  Physical Exam  Constitutional:       Appearance: Normal appearance. She is well-developed.   Cardiovascular:      Rate " and Rhythm: Normal rate and regular rhythm.      Heart sounds: Normal heart sounds.   Pulmonary:      Effort: Pulmonary effort is normal.      Breath sounds: Normal breath sounds.   Musculoskeletal:         General: No swelling. Normal range of motion.   Skin:     General: Skin is warm and dry.      Findings: No rash.   Neurological:      General: No focal deficit present.      Mental Status: She is alert and oriented to person, place, and time.   Psychiatric:         Mood and Affect: Mood normal.         Behavior: Behavior normal.           Assessment & Plan   Diagnoses and all orders for this visit:    1. Mixed hyperlipidemia (Primary)  -     Lipid Panel    2. Essential hypertension    3. Other specified hypothyroidism  -     TSH Rfx On Abnormal To Free T4    4. Prediabetes  -     Hemoglobin A1c    5. Atherosclerosis of both carotid arteries    6. Grade I diastolic dysfunction    7. Primary osteoarthritis of hip, unspecified laterality                   If there is any room to adjust thyroid meds will increase. Cont meds, f/u in 6 months.

## 2025-03-06 LAB
CHOLEST SERPL-MCNC: 210 MG/DL (ref 0–200)
HBA1C MFR BLD: 5.5 % (ref 4.8–5.6)
HDLC SERPL-MCNC: 50 MG/DL (ref 40–60)
LDLC SERPL CALC-MCNC: 132 MG/DL (ref 0–100)
TRIGL SERPL-MCNC: 156 MG/DL (ref 0–150)
TSH SERPL DL<=0.005 MIU/L-ACNC: 1.65 UIU/ML (ref 0.27–4.2)
VLDLC SERPL CALC-MCNC: 28 MG/DL (ref 5–40)

## 2025-03-13 ENCOUNTER — ANESTHESIA EVENT (OUTPATIENT)
Dept: PERIOP | Facility: HOSPITAL | Age: 80
End: 2025-03-13
Payer: MEDICARE

## 2025-03-13 ENCOUNTER — ANESTHESIA (OUTPATIENT)
Dept: PERIOP | Facility: HOSPITAL | Age: 80
End: 2025-03-13
Payer: MEDICARE

## 2025-03-13 ENCOUNTER — HOSPITAL ENCOUNTER (OUTPATIENT)
Facility: HOSPITAL | Age: 80
Discharge: HOME | End: 2025-03-14
Attending: OBSTETRICS & GYNECOLOGY | Admitting: OBSTETRICS & GYNECOLOGY
Payer: MEDICARE

## 2025-03-13 DIAGNOSIS — N81.4 UTEROVAGINAL PROLAPSE: Primary | ICD-10-CM

## 2025-03-13 DIAGNOSIS — N94.89 ADNEXAL MASS: ICD-10-CM

## 2025-03-13 PROBLEM — G89.18 POSTOPERATIVE PAIN: Status: ACTIVE | Noted: 2025-03-13

## 2025-03-13 LAB
ABO GROUP BLD: NORMAL
BLD GP AB SCN SERPL QL: NEGATIVE
RH BLD: POSITIVE
T&S EXPIRATION DATE: NORMAL

## 2025-03-13 PROCEDURE — 25010000002 GLYCOPYRROLATE 0.2 MG/ML SOLUTION: Performed by: NURSE ANESTHETIST, CERTIFIED REGISTERED

## 2025-03-13 PROCEDURE — A9270 NON-COVERED ITEM OR SERVICE: HCPCS | Performed by: STUDENT IN AN ORGANIZED HEALTH CARE EDUCATION/TRAINING PROGRAM

## 2025-03-13 PROCEDURE — 63710000001 ACETAMINOPHEN EXTRA STRENGTH 500 MG TABLET: Performed by: STUDENT IN AN ORGANIZED HEALTH CARE EDUCATION/TRAINING PROGRAM

## 2025-03-13 PROCEDURE — 25010000002 CEFAZOLIN PER 500 MG: Performed by: STUDENT IN AN ORGANIZED HEALTH CARE EDUCATION/TRAINING PROGRAM

## 2025-03-13 PROCEDURE — 25010000002 CEFAZOLIN PER 500 MG: Performed by: NURSE ANESTHETIST, CERTIFIED REGISTERED

## 2025-03-13 PROCEDURE — 25010000002 FENTANYL CITRATE (PF) 50 MCG/ML SOLUTION: Performed by: NURSE ANESTHETIST, CERTIFIED REGISTERED

## 2025-03-13 PROCEDURE — 25010000002 ONDANSETRON PER 1 MG: Performed by: NURSE ANESTHETIST, CERTIFIED REGISTERED

## 2025-03-13 PROCEDURE — 25010000002 LABETALOL 5 MG/ML SOLUTION: Performed by: NURSE ANESTHETIST, CERTIFIED REGISTERED

## 2025-03-13 PROCEDURE — 86850 RBC ANTIBODY SCREEN: CPT | Performed by: OBSTETRICS & GYNECOLOGY

## 2025-03-13 PROCEDURE — 25010000002 EPINEPHRINE PER 0.1 MG: Performed by: OBSTETRICS & GYNECOLOGY

## 2025-03-13 PROCEDURE — 25010000002 KETOROLAC TROMETHAMINE PER 15 MG: Performed by: STUDENT IN AN ORGANIZED HEALTH CARE EDUCATION/TRAINING PROGRAM

## 2025-03-13 PROCEDURE — 25010000002 SUGAMMADEX 200 MG/2ML SOLUTION: Performed by: NURSE ANESTHETIST, CERTIFIED REGISTERED

## 2025-03-13 PROCEDURE — G0378 HOSPITAL OBSERVATION PER HR: HCPCS

## 2025-03-13 PROCEDURE — C1763 CONN TISS, NON-HUMAN: HCPCS | Performed by: OBSTETRICS & GYNECOLOGY

## 2025-03-13 PROCEDURE — 25010000002 PROPOFOL 10 MG/ML EMULSION: Performed by: NURSE ANESTHETIST, CERTIFIED REGISTERED

## 2025-03-13 PROCEDURE — C1771 REP DEV, URINARY, W/SLING: HCPCS | Performed by: OBSTETRICS & GYNECOLOGY

## 2025-03-13 PROCEDURE — 25010000002 DEXAMETHASONE SODIUM PHOSPHATE 20 MG/5ML SOLUTION: Performed by: NURSE ANESTHETIST, CERTIFIED REGISTERED

## 2025-03-13 PROCEDURE — 25810000003 LACTATED RINGERS PER 1000 ML: Performed by: ANESTHESIOLOGY

## 2025-03-13 PROCEDURE — 25010000002 FENTANYL CITRATE (PF) 50 MCG/ML SOLUTION: Performed by: ANESTHESIOLOGY

## 2025-03-13 PROCEDURE — 25010000002 MIDAZOLAM PER 1 MG: Performed by: ANESTHESIOLOGY

## 2025-03-13 PROCEDURE — 25010000002 HYDROMORPHONE PER 4 MG: Performed by: NURSE ANESTHETIST, CERTIFIED REGISTERED

## 2025-03-13 PROCEDURE — 25010000002 HYDROMORPHONE 1 MG/ML SOLUTION: Performed by: NURSE ANESTHETIST, CERTIFIED REGISTERED

## 2025-03-13 PROCEDURE — 88307 TISSUE EXAM BY PATHOLOGIST: CPT | Performed by: OBSTETRICS & GYNECOLOGY

## 2025-03-13 PROCEDURE — 25010000002 LIDOCAINE 2% SOLUTION: Performed by: NURSE ANESTHETIST, CERTIFIED REGISTERED

## 2025-03-13 PROCEDURE — 88331 PATH CONSLTJ SURG 1 BLK 1SPC: CPT | Performed by: OBSTETRICS & GYNECOLOGY

## 2025-03-13 PROCEDURE — 25010000002 MAGNESIUM SULFATE PER 500 MG OF MAGNESIUM: Performed by: NURSE ANESTHETIST, CERTIFIED REGISTERED

## 2025-03-13 PROCEDURE — 86901 BLOOD TYPING SEROLOGIC RH(D): CPT | Performed by: OBSTETRICS & GYNECOLOGY

## 2025-03-13 PROCEDURE — 86900 BLOOD TYPING SEROLOGIC ABO: CPT | Performed by: OBSTETRICS & GYNECOLOGY

## 2025-03-13 DEVICE — TRADITIONAL Y MESH
Type: IMPLANTABLE DEVICE | Site: VAGINA | Status: FUNCTIONAL
Brand: UPSYLON™

## 2025-03-13 DEVICE — KNOTLESS TISSUE CONTROL DEVICE, UNDYED UNIDIRECTIONAL (ANTIBACTERIAL) SYNTHETIC ABSORBABLE DEVICE
Type: IMPLANTABLE DEVICE | Site: VAGINA | Status: FUNCTIONAL
Brand: STRATAFIX

## 2025-03-13 DEVICE — KNOTLESS TISSUE CONTROL DEVICE, VIOLET UNIDIRECTIONAL (ANTIBACTERIAL) SYNTHETIC ABSORBABLE DEVICE
Type: IMPLANTABLE DEVICE | Site: VAGINA | Status: FUNCTIONAL
Brand: STRATAFIX

## 2025-03-13 DEVICE — TRANSVAGINAL MID-URETHRAL SLING
Type: IMPLANTABLE DEVICE | Site: VAGINA | Status: FUNCTIONAL
Brand: ADVANTAGE FIT™  SYSTEM

## 2025-03-13 RX ORDER — SODIUM CHLORIDE 0.9 % (FLUSH) 0.9 %
3-10 SYRINGE (ML) INJECTION AS NEEDED
Status: DISCONTINUED | OUTPATIENT
Start: 2025-03-13 | End: 2025-03-13 | Stop reason: HOSPADM

## 2025-03-13 RX ORDER — OXYCODONE HYDROCHLORIDE 5 MG/1
5 TABLET ORAL EVERY 8 HOURS PRN
Qty: 20 TABLET | Refills: 0 | Status: SHIPPED | OUTPATIENT
Start: 2025-03-13 | End: 2026-03-13

## 2025-03-13 RX ORDER — FENTANYL CITRATE 50 UG/ML
INJECTION, SOLUTION INTRAMUSCULAR; INTRAVENOUS AS NEEDED
Status: DISCONTINUED | OUTPATIENT
Start: 2025-03-13 | End: 2025-03-13 | Stop reason: SURG

## 2025-03-13 RX ORDER — IPRATROPIUM BROMIDE AND ALBUTEROL SULFATE 2.5; .5 MG/3ML; MG/3ML
3 SOLUTION RESPIRATORY (INHALATION) ONCE AS NEEDED
Status: DISCONTINUED | OUTPATIENT
Start: 2025-03-13 | End: 2025-03-13 | Stop reason: HOSPADM

## 2025-03-13 RX ORDER — ONDANSETRON 2 MG/ML
INJECTION INTRAMUSCULAR; INTRAVENOUS AS NEEDED
Status: DISCONTINUED | OUTPATIENT
Start: 2025-03-13 | End: 2025-03-13 | Stop reason: SURG

## 2025-03-13 RX ORDER — KETOROLAC TROMETHAMINE 15 MG/ML
15 INJECTION, SOLUTION INTRAMUSCULAR; INTRAVENOUS EVERY 6 HOURS
Status: COMPLETED | OUTPATIENT
Start: 2025-03-13 | End: 2025-03-14

## 2025-03-13 RX ORDER — PROMETHAZINE HYDROCHLORIDE 25 MG/1
25 SUPPOSITORY RECTAL ONCE AS NEEDED
Status: DISCONTINUED | OUTPATIENT
Start: 2025-03-13 | End: 2025-03-13 | Stop reason: HOSPADM

## 2025-03-13 RX ORDER — DIPHENHYDRAMINE HYDROCHLORIDE 50 MG/ML
12.5 INJECTION INTRAMUSCULAR; INTRAVENOUS
Status: DISCONTINUED | OUTPATIENT
Start: 2025-03-13 | End: 2025-03-13 | Stop reason: HOSPADM

## 2025-03-13 RX ORDER — SODIUM CHLORIDE 9 MG/ML
INJECTION, SOLUTION INTRAVENOUS CONTINUOUS PRN
Status: DISCONTINUED | OUTPATIENT
Start: 2025-03-13 | End: 2025-03-13 | Stop reason: SURG

## 2025-03-13 RX ORDER — KETAMINE HCL IN NACL, ISO-OSM 100MG/10ML
SYRINGE (ML) INJECTION AS NEEDED
Status: DISCONTINUED | OUTPATIENT
Start: 2025-03-13 | End: 2025-03-13 | Stop reason: SURG

## 2025-03-13 RX ORDER — ATROPINE SULFATE 0.4 MG/ML
0.4 INJECTION, SOLUTION INTRAMUSCULAR; INTRAVENOUS; SUBCUTANEOUS ONCE AS NEEDED
Status: DISCONTINUED | OUTPATIENT
Start: 2025-03-13 | End: 2025-03-13 | Stop reason: HOSPADM

## 2025-03-13 RX ORDER — FAMOTIDINE 10 MG/ML
20 INJECTION, SOLUTION INTRAVENOUS ONCE
Status: COMPLETED | OUTPATIENT
Start: 2025-03-13 | End: 2025-03-13

## 2025-03-13 RX ORDER — ONDANSETRON 2 MG/ML
4 INJECTION INTRAMUSCULAR; INTRAVENOUS ONCE AS NEEDED
Status: COMPLETED | OUTPATIENT
Start: 2025-03-13 | End: 2025-03-13

## 2025-03-13 RX ORDER — LABETALOL HYDROCHLORIDE 5 MG/ML
5 INJECTION, SOLUTION INTRAVENOUS
Status: DISCONTINUED | OUTPATIENT
Start: 2025-03-13 | End: 2025-03-13 | Stop reason: HOSPADM

## 2025-03-13 RX ORDER — CEFAZOLIN SODIUM 500 MG/2.2ML
INJECTION, POWDER, FOR SOLUTION INTRAMUSCULAR; INTRAVENOUS AS NEEDED
Status: DISCONTINUED | OUTPATIENT
Start: 2025-03-13 | End: 2025-03-13 | Stop reason: SURG

## 2025-03-13 RX ORDER — MIDAZOLAM HYDROCHLORIDE 1 MG/ML
0.5 INJECTION, SOLUTION INTRAMUSCULAR; INTRAVENOUS
Status: DISCONTINUED | OUTPATIENT
Start: 2025-03-13 | End: 2025-03-13 | Stop reason: HOSPADM

## 2025-03-13 RX ORDER — FENTANYL CITRATE 50 UG/ML
50 INJECTION, SOLUTION INTRAMUSCULAR; INTRAVENOUS ONCE AS NEEDED
Status: COMPLETED | OUTPATIENT
Start: 2025-03-13 | End: 2025-03-13

## 2025-03-13 RX ORDER — DROPERIDOL 2.5 MG/ML
0.62 INJECTION, SOLUTION INTRAMUSCULAR; INTRAVENOUS
Status: DISCONTINUED | OUTPATIENT
Start: 2025-03-13 | End: 2025-03-13 | Stop reason: HOSPADM

## 2025-03-13 RX ORDER — PROPOFOL 10 MG/ML
VIAL (ML) INTRAVENOUS AS NEEDED
Status: DISCONTINUED | OUTPATIENT
Start: 2025-03-13 | End: 2025-03-13 | Stop reason: SURG

## 2025-03-13 RX ORDER — FLUMAZENIL 0.1 MG/ML
0.2 INJECTION INTRAVENOUS AS NEEDED
Status: DISCONTINUED | OUTPATIENT
Start: 2025-03-13 | End: 2025-03-13 | Stop reason: HOSPADM

## 2025-03-13 RX ORDER — DEXTROSE MONOHYDRATE 25 G/50ML
INJECTION, SOLUTION INTRAVENOUS AS NEEDED
Status: DISCONTINUED | OUTPATIENT
Start: 2025-03-13 | End: 2025-03-13 | Stop reason: HOSPADM

## 2025-03-13 RX ORDER — HYDRALAZINE HYDROCHLORIDE 20 MG/ML
5 INJECTION INTRAMUSCULAR; INTRAVENOUS
Status: DISCONTINUED | OUTPATIENT
Start: 2025-03-13 | End: 2025-03-13 | Stop reason: HOSPADM

## 2025-03-13 RX ORDER — SODIUM CHLORIDE, SODIUM LACTATE, POTASSIUM CHLORIDE, CALCIUM CHLORIDE 600; 310; 30; 20 MG/100ML; MG/100ML; MG/100ML; MG/100ML
9 INJECTION, SOLUTION INTRAVENOUS CONTINUOUS
Status: ACTIVE | OUTPATIENT
Start: 2025-03-13 | End: 2025-03-14

## 2025-03-13 RX ORDER — HYDROCODONE BITARTRATE AND ACETAMINOPHEN 5; 325 MG/1; MG/1
1 TABLET ORAL ONCE AS NEEDED
Status: DISCONTINUED | OUTPATIENT
Start: 2025-03-13 | End: 2025-03-13 | Stop reason: HOSPADM

## 2025-03-13 RX ORDER — LIDOCAINE HYDROCHLORIDE 10 MG/ML
0.5 INJECTION, SOLUTION INFILTRATION; PERINEURAL ONCE AS NEEDED
Status: DISCONTINUED | OUTPATIENT
Start: 2025-03-13 | End: 2025-03-13 | Stop reason: HOSPADM

## 2025-03-13 RX ORDER — GLYCOPYRROLATE 0.2 MG/ML
INJECTION INTRAMUSCULAR; INTRAVENOUS AS NEEDED
Status: DISCONTINUED | OUTPATIENT
Start: 2025-03-13 | End: 2025-03-13 | Stop reason: SURG

## 2025-03-13 RX ORDER — BUPIVACAINE HCL/0.9 % NACL/PF 0.125 %
PLASTIC BAG, INJECTION (ML) EPIDURAL AS NEEDED
Status: DISCONTINUED | OUTPATIENT
Start: 2025-03-13 | End: 2025-03-13 | Stop reason: SURG

## 2025-03-13 RX ORDER — LIDOCAINE HYDROCHLORIDE 20 MG/ML
INJECTION, SOLUTION INFILTRATION; PERINEURAL AS NEEDED
Status: DISCONTINUED | OUTPATIENT
Start: 2025-03-13 | End: 2025-03-13 | Stop reason: SURG

## 2025-03-13 RX ORDER — FENTANYL CITRATE 50 UG/ML
50 INJECTION, SOLUTION INTRAMUSCULAR; INTRAVENOUS
Status: DISCONTINUED | OUTPATIENT
Start: 2025-03-13 | End: 2025-03-13 | Stop reason: HOSPADM

## 2025-03-13 RX ORDER — ACETAMINOPHEN 500 MG
1000 TABLET ORAL EVERY 8 HOURS
Status: DISCONTINUED | OUTPATIENT
Start: 2025-03-13 | End: 2025-03-14 | Stop reason: HOSPADM

## 2025-03-13 RX ORDER — HYDROMORPHONE HYDROCHLORIDE 1 MG/ML
0.5 INJECTION, SOLUTION INTRAMUSCULAR; INTRAVENOUS; SUBCUTANEOUS
Status: DISCONTINUED | OUTPATIENT
Start: 2025-03-13 | End: 2025-03-13 | Stop reason: HOSPADM

## 2025-03-13 RX ORDER — NALOXONE HCL 0.4 MG/ML
0.2 VIAL (ML) INJECTION AS NEEDED
Status: DISCONTINUED | OUTPATIENT
Start: 2025-03-13 | End: 2025-03-13 | Stop reason: HOSPADM

## 2025-03-13 RX ORDER — EPHEDRINE SULFATE 50 MG/ML
5 INJECTION, SOLUTION INTRAVENOUS ONCE AS NEEDED
Status: DISCONTINUED | OUTPATIENT
Start: 2025-03-13 | End: 2025-03-13 | Stop reason: HOSPADM

## 2025-03-13 RX ORDER — LABETALOL HYDROCHLORIDE 5 MG/ML
INJECTION, SOLUTION INTRAVENOUS AS NEEDED
Status: DISCONTINUED | OUTPATIENT
Start: 2025-03-13 | End: 2025-03-13 | Stop reason: SURG

## 2025-03-13 RX ORDER — EPHEDRINE SULFATE 50 MG/ML
INJECTION INTRAVENOUS AS NEEDED
Status: DISCONTINUED | OUTPATIENT
Start: 2025-03-13 | End: 2025-03-13 | Stop reason: SURG

## 2025-03-13 RX ORDER — MAGNESIUM SULFATE HEPTAHYDRATE 500 MG/ML
INJECTION, SOLUTION INTRAMUSCULAR; INTRAVENOUS AS NEEDED
Status: DISCONTINUED | OUTPATIENT
Start: 2025-03-13 | End: 2025-03-13 | Stop reason: SURG

## 2025-03-13 RX ORDER — OXYCODONE HYDROCHLORIDE 10 MG/1
10 TABLET ORAL EVERY 4 HOURS PRN
Status: DISCONTINUED | OUTPATIENT
Start: 2025-03-13 | End: 2025-03-14 | Stop reason: HOSPADM

## 2025-03-13 RX ORDER — DEXAMETHASONE SODIUM PHOSPHATE 4 MG/ML
INJECTION, SOLUTION INTRA-ARTICULAR; INTRALESIONAL; INTRAMUSCULAR; INTRAVENOUS; SOFT TISSUE AS NEEDED
Status: DISCONTINUED | OUTPATIENT
Start: 2025-03-13 | End: 2025-03-13 | Stop reason: SURG

## 2025-03-13 RX ORDER — ROCURONIUM BROMIDE 10 MG/ML
INJECTION, SOLUTION INTRAVENOUS AS NEEDED
Status: DISCONTINUED | OUTPATIENT
Start: 2025-03-13 | End: 2025-03-13 | Stop reason: SURG

## 2025-03-13 RX ORDER — OXYCODONE AND ACETAMINOPHEN 7.5; 325 MG/1; MG/1
1 TABLET ORAL EVERY 4 HOURS PRN
Status: DISCONTINUED | OUTPATIENT
Start: 2025-03-13 | End: 2025-03-13 | Stop reason: HOSPADM

## 2025-03-13 RX ORDER — PROMETHAZINE HYDROCHLORIDE 25 MG/1
25 TABLET ORAL ONCE AS NEEDED
Status: DISCONTINUED | OUTPATIENT
Start: 2025-03-13 | End: 2025-03-13 | Stop reason: HOSPADM

## 2025-03-13 RX ORDER — SODIUM CHLORIDE 0.9 % (FLUSH) 0.9 %
3 SYRINGE (ML) INJECTION EVERY 12 HOURS SCHEDULED
Status: DISCONTINUED | OUTPATIENT
Start: 2025-03-13 | End: 2025-03-13 | Stop reason: HOSPADM

## 2025-03-13 RX ADMIN — MIDAZOLAM HYDROCHLORIDE 1 MG: 1 INJECTION, SOLUTION INTRAMUSCULAR; INTRAVENOUS at 06:53

## 2025-03-13 RX ADMIN — FENTANYL CITRATE 50 MCG: 50 INJECTION, SOLUTION INTRAMUSCULAR; INTRAVENOUS at 16:19

## 2025-03-13 RX ADMIN — SODIUM CHLORIDE, POTASSIUM CHLORIDE, SODIUM LACTATE AND CALCIUM CHLORIDE 9 ML/HR: 600; 310; 30; 20 INJECTION, SOLUTION INTRAVENOUS at 06:52

## 2025-03-13 RX ADMIN — Medication 20 MG: at 08:00

## 2025-03-13 RX ADMIN — ONDANSETRON 4 MG: 2 INJECTION, SOLUTION INTRAMUSCULAR; INTRAVENOUS at 12:35

## 2025-03-13 RX ADMIN — ACETAMINOPHEN 1000 MG: 500 TABLET, FILM COATED ORAL at 21:00

## 2025-03-13 RX ADMIN — HYDROMORPHONE HYDROCHLORIDE 0.5 MG: 1 INJECTION, SOLUTION INTRAMUSCULAR; INTRAVENOUS; SUBCUTANEOUS at 13:25

## 2025-03-13 RX ADMIN — ONDANSETRON 4 MG: 2 INJECTION, SOLUTION INTRAMUSCULAR; INTRAVENOUS at 17:55

## 2025-03-13 RX ADMIN — CEFAZOLIN 2 G: 225 INJECTION, POWDER, FOR SOLUTION INTRAMUSCULAR; INTRAVENOUS at 11:26

## 2025-03-13 RX ADMIN — Medication 10 MG: at 09:00

## 2025-03-13 RX ADMIN — FENTANYL CITRATE 50 MCG: 50 INJECTION, SOLUTION INTRAMUSCULAR; INTRAVENOUS at 06:53

## 2025-03-13 RX ADMIN — PROPOFOL 150 MG: 10 INJECTION, EMULSION INTRAVENOUS at 07:43

## 2025-03-13 RX ADMIN — ROCURONIUM BROMIDE 20 MG: 10 INJECTION, SOLUTION INTRAVENOUS at 10:05

## 2025-03-13 RX ADMIN — LIDOCAINE HYDROCHLORIDE 60 MG: 20 INJECTION, SOLUTION INFILTRATION; PERINEURAL at 07:43

## 2025-03-13 RX ADMIN — FENTANYL CITRATE 50 MCG: 50 INJECTION, SOLUTION INTRAMUSCULAR; INTRAVENOUS at 15:58

## 2025-03-13 RX ADMIN — MAGNESIUM SULFATE HEPTAHYDRATE 1 G: 500 INJECTION, SOLUTION INTRAMUSCULAR; INTRAVENOUS at 08:29

## 2025-03-13 RX ADMIN — HYDROMORPHONE HYDROCHLORIDE 0.5 MG: 1 INJECTION, SOLUTION INTRAMUSCULAR; INTRAVENOUS; SUBCUTANEOUS at 16:00

## 2025-03-13 RX ADMIN — FENTANYL CITRATE 50 MCG: 50 INJECTION, SOLUTION INTRAMUSCULAR; INTRAVENOUS at 07:43

## 2025-03-13 RX ADMIN — CEFAZOLIN 2000 MG: 2 INJECTION, POWDER, FOR SOLUTION INTRAMUSCULAR; INTRAVENOUS at 07:26

## 2025-03-13 RX ADMIN — ROCURONIUM BROMIDE 20 MG: 10 INJECTION, SOLUTION INTRAVENOUS at 11:03

## 2025-03-13 RX ADMIN — KETOROLAC TROMETHAMINE 15 MG: 15 INJECTION, SOLUTION INTRAMUSCULAR; INTRAVENOUS at 20:09

## 2025-03-13 RX ADMIN — EPHEDRINE SULFATE 10 MG: 50 INJECTION INTRAVENOUS at 08:13

## 2025-03-13 RX ADMIN — SUGAMMADEX 200 MG: 100 INJECTION, SOLUTION INTRAVENOUS at 13:25

## 2025-03-13 RX ADMIN — GLYCOPYRROLATE 0.2 MG: 0.2 INJECTION INTRAMUSCULAR; INTRAVENOUS at 08:13

## 2025-03-13 RX ADMIN — Medication 10 MG: at 10:00

## 2025-03-13 RX ADMIN — MAGNESIUM SULFATE HEPTAHYDRATE 1 G: 500 INJECTION, SOLUTION INTRAMUSCULAR; INTRAVENOUS at 08:00

## 2025-03-13 RX ADMIN — FAMOTIDINE 20 MG: 10 INJECTION INTRAVENOUS at 06:53

## 2025-03-13 RX ADMIN — Medication 100 MCG: at 07:53

## 2025-03-13 RX ADMIN — ROCURONIUM BROMIDE 20 MG: 10 INJECTION, SOLUTION INTRAVENOUS at 12:05

## 2025-03-13 RX ADMIN — DEXAMETHASONE SODIUM PHOSPHATE 6 MG: 4 INJECTION, SOLUTION INTRAMUSCULAR; INTRAVENOUS at 08:15

## 2025-03-13 RX ADMIN — SODIUM CHLORIDE, POTASSIUM CHLORIDE, SODIUM LACTATE AND CALCIUM CHLORIDE: 600; 310; 30; 20 INJECTION, SOLUTION INTRAVENOUS at 11:32

## 2025-03-13 RX ADMIN — LABETALOL HYDROCHLORIDE 5 MG: 5 INJECTION, SOLUTION INTRAVENOUS at 09:15

## 2025-03-13 RX ADMIN — EPHEDRINE SULFATE 10 MG: 50 INJECTION INTRAVENOUS at 07:53

## 2025-03-13 RX ADMIN — PROPOFOL 50 MG: 10 INJECTION, EMULSION INTRAVENOUS at 08:35

## 2025-03-13 RX ADMIN — PROPOFOL 50 MG: 10 INJECTION, EMULSION INTRAVENOUS at 07:56

## 2025-03-13 RX ADMIN — ROCURONIUM BROMIDE 50 MG: 10 INJECTION, SOLUTION INTRAVENOUS at 07:43

## 2025-03-13 RX ADMIN — ROCURONIUM BROMIDE 20 MG: 10 INJECTION, SOLUTION INTRAVENOUS at 09:00

## 2025-03-13 RX ADMIN — Medication 10 MG: at 11:00

## 2025-03-13 RX ADMIN — SODIUM CHLORIDE: 9 INJECTION, SOLUTION INTRAVENOUS at 07:50

## 2025-03-13 NOTE — ANESTHESIA PROCEDURE NOTES
Peripheral IV    Patient location during procedure: OR  Start time: 3/13/2025 7:51 AM  End time: 3/13/2025 7:53 AM  Line placed for Fluids/Medication Admin.  Preanesthetic Checklist  Completed: patient identified, IV checked, site marked, risks and benefits discussed, surgical consent, monitors and equipment checked, pre-op evaluation and timeout performed  Peripheral IV Prep   Patient position: supine   Prep: ChloraPrep  Patient monitoring: heart rate, cardiac monitor and continuous pulse ox  Peripheral IV Procedure   Laterality:right  Location:  Antecubital  Catheter size: 18 G  Guidance: ultrasound guided          Post Assessment   Dressing Type: transparent and tape.    IV Dressing/Site: clean, dry and intact  Additional Notes  Ultrasound Interpretation:  Using ultrasound guidance the potential vascular sites for insertion of the catheter were visualized to determine the patency of the vessel to be used for vascular access.  After selecting the appropriate site for insertion, the needle was visualized under ultrasound being inserted into the vessel, followed by ultrasound confirmation of catheter placement.  There were no abnormalities seen on ultrasound; an image was taken/ and the patient tolerated the procedure with no complications.

## 2025-03-13 NOTE — OP NOTE
Pre-op Dx:    1. pelvic organ prolapse  2. Stress urinary incontinence  3. Intrinsic sphincter deficiency  4. Right adnexal mass     Post-op Dx:  same     Procedure:   Total Robotic Hysterectomy, bilateral salpingo-oophorectomy, sacrocolpopexy, retropubic midurethral sling, cystoscopy     Surgeon: Dr. Catarino Ray  Asst: Dr. Issa Bridges  Anes: GETA  Antibiotics: Ancef 2g IV   EBL: 200cc  Findings:   Uterus small, mobile and anteverted.    RIGHT ovary with 6 cm simple appearing cyst - intraoperative frozen pathology benign  Left ovary adherent to the rectum - s/p adhesiolysis  On cystoscopy bladder intact and ureters patent bilaterally.    Hemostatic at conclusion  Specimen: Uterus, Cervix, bilateral fallopian tubes and ovaries  Complications: none  Status: Stable to PACU     PROCEDURE:    Patient was seen in the preoperative area. Risks, benefits and alternatives to the surgery were discussed and consents were reviewed.    The patient was brought to the operating room with IV fluids running. General anesthesia was administered. Patient was positioned in dorsal lithotomy position with a pink pad. Her arms were tucked. Testing in 30-degrees of Trendelenberg revealed that she was secure to the operating table. She was then prepared and draped in the usual sterile fashion. A surgical timeout was performed and all were in agreement. A borges catheter was placed.  A right angle retractor was placed into the vagina. The anterior lip of the cervix was grasped with a single-tooth tenaculum and the cervix was sequentially dilated. A 8 cm tip and 3 cm cup for the ANEL II uterine manipulator was then placed and the tenaculum was removed.      A Veress needle was inserted at Herring's point and low opening pressure was confirmed for intraperitoneal entry. The abdomen was insufflated to 15 mmHg and the Veress was removed. An incision was made just inferior to this point and the 12 mm Optical view port was introduced into  the patient's abdomen through linton's point.  The abdomen and pelvis were then inspected as well as the anterior abdominal wall. Adhesive disease of omentum and bowel was noted from the umbilicus to the level of the pubic symphysis.Adhesiolysis was performed laparoscopically with sharp and blunt dissection until umbilical trocar was able to be safely placed.      Next, the umbilicus was grasped with 2 Allis clamps and a vertical incision was made through the umbilicus and carried down with a scalpel. An 8 mm robotic trocar was then introduced under direct visualization. An additional robotic port was placed lateral to the assist port at the level of the umbilicus in the left lower quadrant.   On the right side, a robotic trocar was placed 10 cm lateral to the umbilical trocar and the second robotic trocar was placed 10 cm lateral and 1 cm inferior to the umbilical port. All of the trocars were introduced under direct visualization. The patient was then placed in steep Trendelenburg position, and the robot was docked on the patient's left  The Cadiere forceps were placed in arm 1, camera in arm 2,  the monopolar scissors in arm 3 and the Bipolar forceps in arm 4.       The right and left ureter were identified prior to starting the hysterectomy. The right round ligament was grasped, cauterized and transected with the monopolar scissors. This incision was continued cephalad through the peritoneum adjacent to the infundibulo-pelvic ligament and the infundibulopelvic ligament was isolated. The ureter was identified and the peritoneal incision was made to skeletonize the infundibulopelvic ligament. The anterior and posterior leaves of the broad ligament were then opened on the right side. The right uterine vessels were skeletonized. The bladder flap was developed anteriorly. The right ureter was identified transperitoneally, which was noted to be well below the infundibulopelvic and utero-ovarian ligaments. We then  repeated this on the left side and in a similar fashion, the left fallopian tube was isolated, the round ligament was transected, opened the broad ligament, skeletonized the uterine arteries, and identified the left ureter.  Adhesive disease of the left ovary to the rectum was identified in lysed clear of the left ureter. The monopolar scissors were removed and the robotic vessel sealer was inserted.   At this time the bilateral infundibulopelvic ligaments and bilateral uterine arteries were coagulated and transected using the vessel sealer. The uteroovarian ligament on the right was divided to separate the right adnexal mass. This was placed in a bag. The vessel sealer was removed and monopolar inserted. The bladder flap was further developed and the anterior vagina was further dissected down to reach the level of the trigone, and the colpotomy was performed using the monopolar scissors. The uterus, cervix and bilateral tubes and left ovary were then delivered into the vagina and handed off the field, as was the bag containing the left ovary which was sent for frozen section (returned benign). An Asepto bulb was placed in the vagina to maintain the pneumoperitoneum. The large needle  was inserted in Arm 1 and the zamzam- suture cut in arm 3. The vaginal cuff was then closed using 0-Stratfix suture in a running non-locking fashion. The first layer was imbricated using 0-Stratafix suture  Excellent hemostasis was noted at all the pedicles.  The needle drivers were removed and the Cadiere and monopolar scissor reinserted.    Attention was turned to the sacrocolpopexy. Bowel was moved out of the pelvis, so that the sacral promontory was adequately visualized. The peritoneum over the promontory was then grasped and an incision was made in the peritoneum using the monopolar scissors. The dissection was carried down until the anterior longitudinal ligament was visualized. The middle sacral artery was coagulated along  the promontory where the fixation sutures to the mesh were planned to be placed. The peritoneal incision was then carried down along the right pelvic sidewall staying medial to the ureter and extending over the apex of the vaginal to the left lateral aspect of the vaginal cuff. A Lucite rosie was then placed in the vagina. The bladder was dissected away from the anterior vaginal wall down to the level of the superior trigone. The peritoneum was then dissected away from the posterior vaginal wall and the rectovaginal space was then entered as the rectum was dissected away from the posterior vaginal wall.  This did require extensive careful dissection >60 minutes due to scar tissue.     The monopolar scissors and cadiere forcep were removed and the needle drivers inserted.  The Fashinating Upsylon-Y polypropylene mesh was then introduced into the abdomen with the abdomen with the anterior leaf cut to 4 cm and a posterior leaf cut to 6 cm. The anterior mesh was then affixed to the anterior vaginal wall in an interrupted fashion using 2-0 PDS and four corner sutures were placed using 2-0 Prolene. 6 sutures were placed anteriorly.  The posterior leaf of the mesh was then affixed to the posterior vaginal wall using 2-0 PDS sutures. eight interrupted sutures were placed.   An additional four corner sutures were placed using 2-0 Prolene.The sacral promontory was then again visualized and 2 sutures of 2-0 Prolene were then placed throught the anterior longitdunial ligament with the first suture approximately 1 cm distal to the promontory and the first suture placed a centimeter proximal to the first. The tail of the mesh was then brought up towards the sacral promontory and the mesh was adjusted in a way such as to support the vagina without placing undue tension on the vagina. Excess mesh was then trimmed and the 2-0 Prolene sutures were then placed through the mesh and the mesh was then tied down to the sacral  promontory.      Following this, the Valadez catheter was removed and cystourehtroscopy was then performed. 50 mL of D50 was injected through the side port of the cystoscope.There was noted to be efflux of urine from bilateral ureteral orficies. The small cystotomy repair could not be visualized. There was evidence of no further bladder injuries or mesh in the bladder. The cystotomy repair was noted to be water-tight. The bladder was then drained a Valadez catheter was then reinserted.     Gloves were changed and attention was turned back to the completion of the robotic portion/closure of the peritoneum.     The peritoneum overlying the mesh was re-approximated in a running non-locking fashion using 2-0 Stratafix suture. .   Excellent hemostasis was noted.        The robot was then undocked. The assistant trocar was then removed and was then closed using a Arsen-Blunt device and a suture of #0 Vicryl suture. The patient was taken out of Trendenelburg position and given 5 large breaths to expel all remaining carbon dioxide gas from the abdomen through open ports, and the remaining trocars were removed. All remaining skin incisions were closed in a subcuticular fashion using 4-0 Monocryl.  Steri-Strips were placed over all 5 incisions. Good hemostastis was noted from all trocar sites.    Attention was turned to the retropubic midurethral sling. Two Allis' were placed at 1 cm proximal to the urethral meatus and at the urethro-vesicular junction framing the mid-urethra. Exit points were marked on the pubis at the pubic bone 2 cm lateral from the midline. Both suprapubic exit points were injected with 30 ml of dilute epinephrine solution (0.625 mg epi in 500 ml NS) for hydro dissection. Both lateral sulci were injected with 60 ml of the dilute epinephrine solution and a midline incision was made in the vaginal mucosa over the mid-urethra with a #15 blade scalpel. Bilateral tunnels were made under the mucosa toward the  pubic rami until the pubic bone was reached.  The Advantage Fit ([employer]) retropubic sling was then attached to the trocar and the trocar was guided through the right retropubic space to the exit point. It was reloaded then guided through the left retropubic space and the trocar was withdrawn. The vaginal sulci were checked to ensure no injury to the vaginal mucosa. The borges catheter was removed and 70 degree cystoscope was used to evaluate the bladder. There was no evidence of bladder mucosal injury or trocar injury to the urethra. Bilateral ureteral efflux was visualized. The cystoscope was removed and the borges catheter was replaced. The tabs were pulled at the pubic bone until the blue tab layed flush with the oren clamp at the mid urethra. The blue tab was cut and sheaths were pulled off and removed. The mesh over the urethra was seen to be laying flat and loose enough not to exert pressure on urethra at rest. The vaginal incision was copiously irrigated with sterile fluid and wound was found to be hemostatic. A horizontal mattress suture was placed across the incision line with care to not incorporate the mesh. Then the superficial Incision was closed with a running non-locked 2-0 Vicryl.  4-0 monocryl was used to close the suprapubic skin incision sites.      All counts were correct x 2 (needle and lap). The patient tolerated the procedure well. Dr. Ray was present and participated in all portions of the case. The patient was awakened from anesthesia and extubated without difficulty. She was transferred to the PACU in stable condition     Issa Bridges MD  [facility] [id number]   I was present and scrubbed for the entire case

## 2025-03-13 NOTE — DISCHARGE INSTRUCTIONS
"After the surgery you may:  -Shower  -Walk around the house or no normal activity (light exercise).  Listen to your body and, when tired, stop or rest.  You will have less energy and feel more \"dizzyness\" in the weeks after surgery than is normal for you.  -Drive in 1-2 weeks when you are off pain medication that has narcotics (like Percocet or Vicodin)  -Go up and down stairs  -Lift normal household objects or light people (gallon of milk, laundry basket, tools, infant or toddler)    Please do NOT:  -Place anything in the vagina for 6 weeks after surgery  -Lift >50 pounds (furniture, heavy dogs, or other adult people)    Please contact physician if any of the following  -Nausea/vomitng  -Lack of gas or BM for >24 hours; keep in mind may take 5-7 days after surgery to have bowel movement.  May take 17 grams miralax nightly (safe to take nightly) or dulcolax 10 mg by mouth if that does not help with hard stool or constipation.  -Severe burning or urgency of urination or blood in urine  -Temperature >100 degrees Fahrenheit.  -Severe vaginal bleeding (>1 pad/hour)  -Pain that is not relieved by your regular pain medications  -Fainting or \"passing out\"  -Severe chest pain or shortness of breath with minimal activity    We are always available in the clinic during daytime hours [phone] or during nights and weekends on the answering service [phone].  There is always a doctor on call available to help you who will know the type of surgery you had and how to address your problem.     "

## 2025-03-13 NOTE — ANESTHESIA PROCEDURE NOTES
Airway  Reason: elective    Date/Time: 3/13/2025 7:49 AM  Difficult airway    General Information and Staff    Patient location during procedure: OR  Anesthesiologist: David Kohli DO  CRNA/CAA: Jasbir Rawls CRNA    Indications and Patient Condition  Indications for airway management: airway protection    Preoxygenated: yes  MILS maintained throughout    Mask difficulty assessment: 2 - vent by mask + OA or adjuvant +/- NMBA    Final Airway Details    Final airway type: endotracheal airway      Successful airway: ETT  Cuffed: yes   Successful intubation technique: direct laryngoscopy  Adjuncts used in placement: Bougie  Endotracheal tube insertion site: oral  Blade: Claudia  Blade size: 3  ETT size (mm): 7.0  Cormack-Lehane Classification: grade III - view of epiglottis only  Placement verified by: chest auscultation and capnometry   Measured from: teeth  ETT/EBT  to teeth (cm): 21  Number of attempts at approach: 1  Assessment: lips, teeth, and gum same as pre-op and atraumatic intubation    Additional Comments  Unable to view cords, eschman placed blindly, next time would use CMAC

## 2025-03-13 NOTE — ANESTHESIA PROCEDURE NOTES
Arterial Line    Pre-sedation assessment completed: 3/13/2025 6:52 AM    Patient reassessed immediately prior to procedure    Patient location during procedure: holding area  Start time: 3/13/2025 6:54 AM  Stop Time:3/13/2025 6:56 AM       Line placed for hemodynamic monitoring and ABGs/Labs/ISTAT.  Performed By   Anesthesiologist: David Kohli DO   Preanesthetic Checklist  Completed: patient identified, IV checked, site marked, risks and benefits discussed, surgical consent, monitors and equipment checked, pre-op evaluation and timeout performed  Arterial Line Prep    Sterile Tech: gloves, mask and cap  Prep: ChloraPrep  Patient monitoring: blood pressure monitoring, continuous pulse oximetry and EKG  Arterial Line Procedure   Laterality:right  Location:  radial artery  Catheter size: 20 G   Guidance: ultrasound guided  PROCEDURE NOTE/ULTRASOUND INTERPRETATION.  Using ultrasound guidance the potential vascular sites for insertion of the catheter were visualized to determine the patency of the vessel to be used for vascular access.  After selecting the appropriate site for insertion, the needle was visualized under ultrasound being inserted into the radial artery, followed by ultrasound confirmation of wire and catheter placement. There were no abnormalities seen on ultrasound; an image was taken; and the patient tolerated the procedure with no complications.   Number of attempts: 1  Successful placement: yes Images: still images obtained, printed/placed on the chart  Post Assessment   Dressing Type: occlusive dressing applied, secured with tape and wrist guard applied.   Complications no  Circ/Move/Sens Assessment: normal and unchanged.   Patient Tolerance: patient tolerated the procedure well with no apparent complications  Additional Notes  Using ultrasound guidance the potential vascular sites for insertion of the catheter were visualized to determine the patency of the vessel to be used for vascular  access.  After selecting the appropriate site for insertion, the needle was visualized under ultrasound being inserted into the radial artery, followed by ultrasound confirmation of wire and catheter placement.  There were no abnormalities seen on ultrasound; an image was taken/ and the patient tolerated the procedure with no complications.

## 2025-03-13 NOTE — ANESTHESIA PREPROCEDURE EVALUATION
Anesthesia Evaluation     Patient summary reviewed   no history of anesthetic complications:   NPO Solid Status: > 8 hours  NPO Liquid Status: > 2 hours           Airway   Mallampati: II  TM distance: >3 FB  Neck ROM: full  No difficulty expected  Dental      Pulmonary     breath sounds clear to auscultation  (+) a smoker Former,sleep apnea (No CPAP)  (-) shortness of breath, recent URI  Cardiovascular   Exercise tolerance: good (4-7 METS)    ECG reviewed  Rhythm: regular  Rate: normal    (+) hypertension, dysrhythmias (s/p ablation for pSVT (2022)), hyperlipidemia,  carotid artery disease (<50%) carotid bilateral  (-) past MI, angina    ROS comment: 8/2022 ECHO - Interpretation Summary  · Calculated left ventricular EF = 63.6% Calculated left ventricular 3D EF = 60% Estimated left ventricular EF was in agreement with the calculated left ventricular EF. Left ventricular systolic function is normal. Normal left ventricular cavity size and wall thickness noted. All left ventricular wall segments contract normally. Left ventricular diastolic function is consistent with (grade I) impaired relaxation.  · Mild to moderate tricuspid valve regurgitation is present. Estimated right ventricular systolic pressure from tricuspid regurgitation is normal (<35 mmHg).    Resolution of prvs pHTN seen in 2021 (49mmHg)    Neuro/Psych  (-) seizures, CVA  GI/Hepatic/Renal/Endo    (+) obesity, GERD, thyroid problem hypothyroidismRenal disease: Cr 0.69. Diabetes: A1c 5.5.    Musculoskeletal     (-) neck stiffness  Abdominal    Substance History      OB/GYN          Other   arthritis,   Blood dyscrasia: Hb 14.4.                    Anesthesia Plan    ASA 2     general and ERAS Protocol     (I have reviewed the patient's history and chart with the patient, including all pertinent laboratory results and imaging. I have explained the risks of anesthesia including but not limited to dental damage, corneal abrasion, nerve injury, MI, stroke,  aspiration, and death. Patient has agreed to proceed.  )  intravenous induction     Anesthetic plan, risks, benefits, and alternatives have been provided, discussed and informed consent has been obtained with: patient.    Use of blood products discussed with patient .        CODE STATUS:

## 2025-03-13 NOTE — H&P
[facility] Urogynecology Return Visit Note    Chief Complaint: No chief complaint on file.    HPI:  Hillary Crenshaw is a 79 y.o.  female scheduled for a TRH, BSO, SCP, retropubic midurethral sling, cystoscopy. She was last seen on 2/3/25.     Medical history is significant for hypertension, hypothyroidism, constipation, depression. Surgical history is significant for hip replacement.     Urodynamics Interpretation (25)  1. Non-instrumented uroflow: Patient unable to void for uroflow. PVR Elevated at 120 mL via cath specimen.    2. CMG: Early first sensation at 7 mL. First desire at 35 mL. Strong desire at 201 mL.  borderline  USP at 201 mL.    3. EMG activity: Normal EMG activity  after strong desire and during void.    4. Positive KASSANDRA with cough or valsalva both with and without reduction of prolapse at USP (201 ml). Leak Point Pressure:  17 cm H2O. Urethral closing pressure 27 cm H2O. No DO.    5. Pressure flow study: Peak flow was 9.9 mL/s. Peak Pdet during void 21 cmH2O. Complete bladder emptying. Voided 160 mL, PVR 48 mL. calculated    6. Evidence of ISD with Leak point pressure 17 and 52 cm H2O, borderline USP at 201. Discuss proceeding with retropubic midurethral sling at the time of TRH sacrocolpopexy     TVUS:24)  FINDINGS:   Uterus anteverted and anteflexed. The uterus measures 5.5 cm in length. Endometrium measures approximately 2 to 3 mm in thickness. There is a small amount of fluid in the endometrial cavity. There is a 6.2 cm simple appearing cyst in the right adnexa. The left ovary is not identified on this exam.   IMPRESSION:   6.2 cm simple appearing cyst in the right adnexa. In a postmenopausal female, recommend attention on a 3-6 month follow-up pelvic ultrasound.       Review of Systems   Reviewed on 3/13/2025 and updated in HPI as indicated.     Past Medical History:   Diagnosis Date    Atherosclerosis of both carotid arteries     : right 16-49%, left with plaque    Chronic  "constipation     GERD (gastroesophageal reflux disease)     Grade I diastolic dysfunction 06/01/2021    Hyperlipidemia     Hypertension     Hypervitaminosis D     Hypothyroidism     Lung nodule     PT STATES JUST \"WATCHING\"    TARI (obstructive sleep apnea)     unable to tolerate CPAP    Osteoarthritis     Osteopenia     Pessary maintenance     Premature ventricular contractions     Prolapse of female bladder, acquired     PSVT (paroxysmal supraventricular tachycardia)     Pulmonary hypertension     Severe per echo June 2021    Right ovarian cyst       Past Surgical History:   Procedure Laterality Date    CARDIAC ELECTROPHYSIOLOGY PROCEDURE N/A 06/03/2022    Procedure: Ablation PVC;  Surgeon: Jasbir De La Torre MD;  Location: [facility] INVASIVE LOCATION;  Service: Cardiovascular;  Laterality: N/A;    CATARACT EXTRACTION, BILATERAL      COLONOSCOPY      ORIF ANKLE FRACTURE Right 2001    OVARIAN CYST SURGERY Right     TOTAL HIP ARTHROPLASTY Left       Medications Prior to Admission   Medication Sig Dispense Refill Last Dose/Taking    aspirin 81 MG EC tablet Take 1 tablet by mouth Daily. (Patient taking differently: Take 1 tablet by mouth Daily. TO HOLD 1 WEEK PRIOR TO OR)       diclofenac (VOLTAREN) 75 MG EC tablet Take 1 tablet by mouth 2 (Two) Times a Day. (Patient taking differently: Take 1 tablet by mouth 2 (Two) Times a Day. TO HOLD 1 WEEK PRIOR TO OR) 180 tablet 3     estradiol (ESTRACE) 0.1 MG/GM vaginal cream Insert 1 g into the vagina 3 (Three) Times a Week.       fluticasone (FLONASE) 50 MCG/ACT nasal spray 2 sprays by Each Nare route Daily. (Patient taking differently: 2 sprays by Each Nare route Daily As Needed.) 16 mL 11     furosemide (LASIX) 20 MG tablet TAKE 1 TABLET BY MOUTH TWICE A DAY (Patient taking differently: Take 1 tablet by mouth Daily.) 180 tablet 0     losartan (COZAAR) 50 MG tablet TAKE 1 TABLET BY MOUTH DAILY 90 tablet 3     montelukast (SINGULAIR) 10 MG tablet TAKE ONE TABLET BY MOUTH " "ONCE NIGHTLY 90 tablet 3     Multiple Vitamins-Minerals (HAIR SKIN & NAILS PO) Take 1 tablet by mouth Daily. TO HOLD 1 WEEK PRIOR TO OR       Synthroid 75 MCG tablet TAKE 1 TABLET BY MOUTH DAILY 90 tablet 3       Current Facility-Administered Medications   Medication Dose Route Frequency Provider Last Rate Last Admin    famotidine (PEPCID) injection 20 mg  20 mg Intravenous Once David Kohli, DO        fentaNYL citrate (PF) (SUBLIMAZE) injection 50 mcg  50 mcg Intravenous Once PRN David Kohli, DO        lactated ringers infusion  9 mL/hr Intravenous Continuous David Kohli, DO        lidocaine (XYLOCAINE) 1 % injection 0.5 mL  0.5 mL Intradermal Once PRN David Kohli, DO        midazolam (VERSED) injection 0.5 mg  0.5 mg Intravenous Q5 Min PRN David Kohli, DO        sodium chloride 0.9 % flush 3 mL  3 mL Intravenous Q12H David Kohli, DO        sodium chloride 0.9 % flush 3-10 mL  3-10 mL Intravenous PRN David Kohli, DO          Allergies   Allergen Reactions    Atorvastatin Myalgia      Social History     Tobacco Use    Smoking status: Former     Current packs/day: 0.00     Average packs/day: 1.5 packs/day for 55.0 years (82.5 ttl pk-yrs)     Types: Cigarettes     Start date: 1958     Quit date: 1966     Years since quittin.2    Smokeless tobacco: Never   Substance Use Topics    Alcohol use: Not Currently     Comment: 7 drinks week/// Caffeine use: 2-3 cups daily      Physical Exam:   Vitals:    25 0625   BP: (!) 185/73   Pulse: 57   Resp: 14   Temp: 98.9 °F (37.2 °C)      Wt Readings from Last 1 Encounters:   25 82.6 kg (182 lb)      Ht Readings from Last 1 Encounters:   25 160 cm (62.99\")      Body mass index is 32.25 kg/m².     General:  Patient is a healthy appearing female who is well developed, well nourished in no acute distress.    Eyes:  Sclera anicteric.   HEENT:  Normocephalic, atraumatic.  "   Chest:  Normal respiratory effort.   Heart: warm and well perfused   Abdomen:  Soft and non-tender, no masses, no hernia. No CVA tenderness.   Skin: No rashes or lesions.   Musculoskeletal:  Normal strength. No edema.   Neurologic:  Alert, nonfocal.   Psychiatric:  Normal affect and mood, oriented x 3.     Genitourinary Exam (25):    External Genitalia:  Normal appearance, no lesions.     Urethral Meatus: normal size and location.  Urethra:  No masses or tenderness.   Bladder:  No masses or tenderness.   Vagina: atrophic changes noted, prolapse present see POP-Q.   Cervix: prolapse see POPQ.  Uterus: prolapse see POPQ.   Adnexa/Parametrial:  No palpable masses, no tenderness.    Perineal Sensation: normal.   Anal Coulter: present.      POPQ Examination:      Aa  +2     Ba  +2    C  -5.5   ------------------------------------   GH  5.5    PB  3    TVL 7   ------------------------------------   Ap  -3     Bp  -3    D  -6      Stage 3       The sensitive parts of the examination were performed with Bren Campbell CMA as a chaperone.       ASSESSMENT AND PLAN:   Hillary Crenshaw is a 79 y.o.  female scheduled for a TRH, BSO, SCP, retropubic sling, cystoscopy.    Prolapse  Stress urinary incontinence  ISD  Left adnexal mass     R/b reviewed, will be for  TRH, BSO, SCP, retropubic sling, cystoscopy for POP-Q stage 3 anterior predominant POP, ISD, KASSANDRA, L adnexal mass  Ancef for abx  Scds for dvt ppx  Home postop  I have reviewed and confirmed the accuracy of the ROS as documented by the MA/LPN/RN Catarino Ray MD

## 2025-03-13 NOTE — NURSING NOTE
"Patient's right eye was red in recovery. She states she's had pain and redness in her right eye for a week and was going to call the ophthalmologist, but waited. She used visine at home and had a \"sand like\" substance come out of her right eye. The redness is not new.   "

## 2025-03-14 ENCOUNTER — READMISSION MANAGEMENT (OUTPATIENT)
Dept: CALL CENTER | Facility: HOSPITAL | Age: 80
End: 2025-03-14
Payer: MEDICARE

## 2025-03-14 VITALS
HEART RATE: 61 BPM | RESPIRATION RATE: 16 BRPM | HEIGHT: 63 IN | SYSTOLIC BLOOD PRESSURE: 107 MMHG | DIASTOLIC BLOOD PRESSURE: 65 MMHG | BODY MASS INDEX: 32.25 KG/M2 | OXYGEN SATURATION: 96 % | WEIGHT: 182 LBS | TEMPERATURE: 97 F

## 2025-03-14 LAB
CYTO UR: NORMAL
LAB AP CASE REPORT: NORMAL
Lab: NORMAL
PATH REPORT.FINAL DX SPEC: NORMAL
PATH REPORT.GROSS SPEC: NORMAL

## 2025-03-14 PROCEDURE — 25010000002 KETOROLAC TROMETHAMINE PER 15 MG: Performed by: STUDENT IN AN ORGANIZED HEALTH CARE EDUCATION/TRAINING PROGRAM

## 2025-03-14 PROCEDURE — G0378 HOSPITAL OBSERVATION PER HR: HCPCS

## 2025-03-14 PROCEDURE — A9270 NON-COVERED ITEM OR SERVICE: HCPCS | Performed by: STUDENT IN AN ORGANIZED HEALTH CARE EDUCATION/TRAINING PROGRAM

## 2025-03-14 PROCEDURE — 63710000001 ACETAMINOPHEN 500 MG TABLET: Performed by: STUDENT IN AN ORGANIZED HEALTH CARE EDUCATION/TRAINING PROGRAM

## 2025-03-14 RX ADMIN — KETOROLAC TROMETHAMINE 15 MG: 15 INJECTION, SOLUTION INTRAMUSCULAR; INTRAVENOUS at 02:36

## 2025-03-14 RX ADMIN — ACETAMINOPHEN 1000 MG: 500 TABLET, FILM COATED ORAL at 04:56

## 2025-03-14 NOTE — OUTREACH NOTE
Prep Survey      Flowsheet Row Responses   [facility] [place] patient discharged from? [place]   Is LACE score < 7 ? Yes   Eligibility [facility]   Date of Admission 03/13/25   Date of Discharge 03/14/25   Discharge Disposition Home or Self Care   Discharge diagnosis Pelvic organ prolapse   Does the patient have one of the following disease processes/diagnoses(primary or secondary)? General Surgery   Does the patient have Home health ordered? No   Is there a DME ordered? No   Prep survey completed? Yes            GEOFF ROCHE - Registered Nurse

## 2025-03-14 NOTE — NURSING NOTE
Pt discharged to home. IV's removed. Went over discharge and follow-up instructions. Daughter providing transportation.

## 2025-03-14 NOTE — PLAN OF CARE
Goal Outcome Evaluation:  Plan of Care Reviewed With: patient        Progress: improving  Outcome Evaluation: VSS lap sites with border dressing CDI.  borges in place.  small amount of vaginal bleeding. Scheduled tylenol and toradol given with good relief.

## 2025-03-14 NOTE — PROGRESS NOTES
Continued Stay Note  [facility]     Patient Name: Hillary Crneshaw  MRN: [id number]  Today's Date: 3/14/2025    Admit Date: 3/13/2025    Plan: Home no needs   Discharge Plan       Row Name 03/14/25 0951       Plan    Plan Home no needs    Plan Comments Discharge order noted. Met with patient who confirmed DC plan is to return home. Family will assist as needed and her dgt will provide transportation at DC. Denies any needs/equipment.                   Discharge Codes    No documentation.                 Expected Discharge Date and Time       Expected Discharge Date Expected Discharge Time    Mar 14, 2025               Taniya Grimes RN

## 2025-03-14 NOTE — CASE MANAGEMENT/SOCIAL WORK
Case Management Discharge Note      Final Note: Home         Selected Continued Care - Discharged on 3/14/2025 Admission date: 3/13/2025 - Discharge disposition: Home or Self Care      Destination    No services have been selected for the patient.                Durable Medical Equipment    No services have been selected for the patient.                Dialysis/Infusion    No services have been selected for the patient.                Home Medical Care    No services have been selected for the patient.                Therapy    No services have been selected for the patient.                Community Resources    No services have been selected for the patient.                Community & DME    No services have been selected for the patient.                         Final Discharge Disposition Code: 01 - home or self-care

## 2025-03-14 NOTE — DISCHARGE SUMMARY
Inpatient Discharge Summary    BRIEF OVERVIEW  Admitting Provider: Catarino Ray MD  Discharge Provider: No att. providers found  Primary Care Physician at Discharge: Laura uMñoz [phone]     Admission Date: 3/13/2025     Discharge Date: 3/14/2025 10:13 AM    Primary Discharge Diagnosis  Pelvic organ prolapse  Stress urinary incontinence  Right adnexal mass    Secondary Discharge Diagnosis  As above    Discharge Disposition  Home or Self Care  Code Status at Discharge: full    Active Issues Requiring Follow-up  6 week postop check    Outpatient Follow-Up  Future Appointments   Date Time Provider Department Center   2025  1:15 PM Anjel Clark MD [id number]   2025 10:45 AM Jeyson Mayberry MD [id number]         Test Results Pending at Discharge      DETAILS OF HOSPITAL STAY    Presenting Problem/History of Present Illness  Postoperative pain [G89.18]  Hillary Crenshaw is a 79 y.o.  female who underwent a TRH, BSO, SCP, retropubic midurethral sling, cystoscopy on 3/13.    Medical history is significant for hypertension, hypothyroidism, constipation, depression. Surgical history is significant for hip replacement.     Hospital Course  Pt underwent the above procedure on 3/13 without complication. Please separate operative note for details. Postoperatively Pt voiced discomfort postoperatively which, given living at home alone with her  with dementia, precluded discharge home. VS WNL. Exam benign. Admitted postop.    Operative Procedures Performed  Procedure(s):  ROBOTIC ASSISTED LAPAROSCOPIC TOTAL HYSTERECTOMY WITH BILATERAL SALPING-OOPHORRECTOMY & SACRAL COLPOPEXY, CYSTOSCOPY RETROPUBIC SLING  Treatments:  TRH, BSO, SCP, retropubic midurethral sling, cystoscopy   Consults: none  Procedures: none  Pertinent Test Results: none    Physical Exam at Discharge  Discharge Condition: good  Heart Rate: 61  Resp: 16  BP: 107/65  Temp: 97 °F (36.1 °C)  Weight: 82.6 kg  (182 lb)  General:  Patient is a healthy appearing female who is well developed, well nourished in no acute distress.    Eyes:  Sclera anicteric.   HEENT:  Normocephalic, atraumatic.    Chest:  Normal respiratory effort.   Heart: warm and well perfused   Abdomen:  Soft and non-tender, no masses, no hernia. No CVA. Incisions c/d/I without erythema or exudate. tenderness.   Skin: No rashes or lesions.   Musculoskeletal:  Normal strength. No edema.   Neurologic:  Alert, nonfocal.   Psychiatric:  Normal affect and mood, oriented x 3.     Issa Bridges MD  FPMRS PGY7

## 2025-03-15 NOTE — ANESTHESIA POSTPROCEDURE EVALUATION
"Patient: Hillary Crenshaw    Procedure Summary       Date: 03/13/25 Room / Location: [facility] OR  /  [facility] OR    Anesthesia Start: 0733 Anesthesia Stop: 1349    Procedure: ROBOTIC ASSISTED LAPAROSCOPIC TOTAL HYSTERECTOMY WITH BILATERAL SALPING-OOPHORRECTOMY & SACRAL COLPOPEXY, CYSTOSCOPY RETROPUBIC SLING (Bilateral: Abdomen) Diagnosis:     Surgeons: Catarino Ray MD Provider: David Kohli DO    Anesthesia Type: general, ERAS Protocol ASA Status: 2            Anesthesia Type: general, ERAS Protocol    Vitals  Vitals Value Taken Time   /71 03/13/25 19:15   Temp 36.6 °C (97.8 °F) 03/13/25 18:15   Pulse 64 03/13/25 19:27   Resp 16 03/13/25 19:15   SpO2 97 % 03/13/25 19:27   Vitals shown include unfiled device data.        Post Anesthesia Care and Evaluation    Patient location during evaluation: bedside  Patient participation: complete - patient participated  Level of consciousness: awake and alert  Pain management: adequate    Airway patency: patent  Anesthetic complications: No anesthetic complications  PONV Status: controlled  Cardiovascular status: acceptable and hemodynamically stable  Respiratory status: acceptable, spontaneous ventilation and nonlabored ventilation  Hydration status: acceptable    Comments: /65 (BP Location: Left arm, Patient Position: Lying)   Pulse 61   Temp 36.1 °C (97 °F) (Oral)   Resp 16   Ht 160 cm (62.99\")   Wt 82.6 kg (182 lb)   SpO2 96%   BMI 32.25 kg/m²       "

## 2025-03-17 ENCOUNTER — TRANSITIONAL CARE MANAGEMENT TELEPHONE ENCOUNTER (OUTPATIENT)
Dept: CALL CENTER | Facility: HOSPITAL | Age: 80
End: 2025-03-17
Payer: MEDICARE

## 2025-03-17 NOTE — OUTREACH NOTE
Call Center TCM Note      Flowsheet Row Responses   [facility] patient discharged from? [place]   Does the patient have one of the following disease processes/diagnoses(primary or secondary)? General Surgery   TCM attempt successful? No   Unsuccessful attempts Attempt 1            Michelle Kenney MA    3/17/2025, 14:15 EDT

## 2025-05-29 ENCOUNTER — TRANSCRIBE ORDERS (OUTPATIENT)
Dept: ADMINISTRATIVE | Facility: HOSPITAL | Age: 80
End: 2025-05-29
Payer: MEDICARE

## 2025-05-29 DIAGNOSIS — Z12.31 SCREENING MAMMOGRAM FOR BREAST CANCER: Primary | ICD-10-CM

## 2025-06-20 ENCOUNTER — RESULTS FOLLOW-UP (OUTPATIENT)
Dept: ADMINISTRATIVE | Facility: HOSPITAL | Age: 80
End: 2025-06-20
Payer: MEDICARE

## 2025-06-20 ENCOUNTER — HOSPITAL ENCOUNTER (OUTPATIENT)
Dept: MAMMOGRAPHY | Facility: HOSPITAL | Age: 80
Discharge: HOME | End: 2025-06-20
Admitting: FAMILY MEDICINE
Payer: MEDICARE

## 2025-06-20 DIAGNOSIS — Z12.31 SCREENING MAMMOGRAM FOR BREAST CANCER: ICD-10-CM

## 2025-06-20 PROCEDURE — 77063 BREAST TOMOSYNTHESIS BI: CPT

## 2025-06-20 PROCEDURE — 77067 SCR MAMMO BI INCL CAD: CPT

## 2025-07-28 DIAGNOSIS — I51.89 GRADE I DIASTOLIC DYSFUNCTION: ICD-10-CM

## 2025-07-28 RX ORDER — FUROSEMIDE 20 MG/1
20 TABLET ORAL 2 TIMES DAILY
Qty: 180 TABLET | Refills: 0 | Status: SHIPPED | OUTPATIENT
Start: 2025-07-28

## 2025-08-09 DIAGNOSIS — M16.12 ARTHRITIS OF LEFT HIP: ICD-10-CM

## 2025-08-09 RX ORDER — DICLOFENAC SODIUM 75 MG/1
75 TABLET, DELAYED RELEASE ORAL 2 TIMES DAILY
Qty: 180 TABLET | Refills: 3 | Status: SHIPPED | OUTPATIENT
Start: 2025-08-09

## (undated) DEVICE — STRIP,CLOSURE,WOUND,MEDI-STRIP,1/2X4: Brand: MEDLINE

## (undated) DEVICE — COUNT NDL MAG FM/BLCK 40COUNT

## (undated) DEVICE — SUT VIC 0 TIES 18IN J912G

## (undated) DEVICE — SUT PDS 0 CT1 36IN Z346H

## (undated) DEVICE — THE STERILE LIGHT HANDLE COVER IS USED WITH STERIS SURGICAL LIGHTING AND VISUALIZATION SYSTEMS.

## (undated) DEVICE — VESSEL SEALER EXTEND: Brand: ENDOWRIST

## (undated) DEVICE — NEEDLE, QUINCKE, 18GX3.5": Brand: MEDLINE

## (undated) DEVICE — TOWEL,OR,DSP,ST,BLUE,STD,4/PK,20PK/CS: Brand: MEDLINE

## (undated) DEVICE — ST TBG AIRSEAL FLTR TRI LUM

## (undated) DEVICE — SUT PDS 2/0 CT1 27IN DYED Z339H

## (undated) DEVICE — CONTN STRL 32OZ

## (undated) DEVICE — PERCLOSE™ PROSTYLE™ SUTURE-MEDIATED CLOSURE AND REPAIR SYSTEM: Brand: PERCLOSE™ PROSTYLE™

## (undated) DEVICE — GW AMPLATZ SUPERSTIFF 3MM J/TP .035IN 145CM

## (undated) DEVICE — TROC BLADLES ANCHORPORT/OPTI LP 12X120MM 1P/U

## (undated) DEVICE — COVER,MAYO STAND,STERILE: Brand: MEDLINE

## (undated) DEVICE — GLV SURG PREMIERPRO ORTHO LTX PF SZ8 BRN

## (undated) DEVICE — RADIFOCUS GLIDEWIRE: Brand: GLIDEWIRE

## (undated) DEVICE — GW MAGICTORQUE .035 260CM

## (undated) DEVICE — Device: Brand: DECANAV

## (undated) DEVICE — Device

## (undated) DEVICE — 60 ML SYRINGE LUER-LOCK TIP: Brand: MONOJECT

## (undated) DEVICE — CATH DIAG IMPULSE PIG 5F 100CM

## (undated) DEVICE — SUT PROLN 2/0 SH 36IN 8523H

## (undated) DEVICE — SUT MNCRYL 2/0 SH 27IN Y317H

## (undated) DEVICE — IRRIGATOR BULB ASEPTO 60CC STRL

## (undated) DEVICE — PINNACLE INTRODUCER SHEATH: Brand: PINNACLE

## (undated) DEVICE — DRAPE,UNDERBUTTOCKS,PCH,STERILE: Brand: MEDLINE

## (undated) DEVICE — SUT MNCRYL PLS ANTIB UD 4/0 PS2 18IN

## (undated) DEVICE — ENDOPOUCH RETRIEVER SPECIMEN RETRIEVAL BAGS: Brand: ENDOPOUCH RETRIEVER

## (undated) DEVICE — SOL NACL 0.9PCT 1000ML

## (undated) DEVICE — MANIP UTER RUMI 2 KOH EFFICIENT 3.5CM BL

## (undated) DEVICE — DRAPE,REIN 53X77,STERILE: Brand: MEDLINE

## (undated) DEVICE — VEIN SELCT VERT 75CM

## (undated) DEVICE — 1 X VERSACROSS STEERABLE SHEATH (INCLUDING  1 X DILATOR AND 1 X J-TIP GUIDEWIRE); 1 X VERSACROSS RF WIRE (INCLUDING 1 X CONNECTOR CABLE (SINGLE USE)); 1 X DISPERSIVE ELECTRODE: Brand: VERSACROSS STEERABLE ACCESS SOLUTION

## (undated) DEVICE — ARM DRAPE

## (undated) DEVICE — SOL NS 500ML

## (undated) DEVICE — SEAL

## (undated) DEVICE — Device: Brand: THERMOCOOL SMARTTOUCH SF

## (undated) DEVICE — GW AMPLTZ SUPERSTIFF SHT/TPR STR .035IN 260CM

## (undated) DEVICE — SYR LUERLOK 5CC

## (undated) DEVICE — SYR LL TP 10ML STRL

## (undated) DEVICE — LOU EP: Brand: MEDLINE INDUSTRIES, INC.

## (undated) DEVICE — ANTIBACTERIAL UNDYED BRAIDED (POLYGLACTIN 910), SYNTHETIC ABSORBABLE SUTURE: Brand: COATED VICRYL

## (undated) DEVICE — SOL ANTISTICK CAUTRY ELECTROLUBE LF

## (undated) DEVICE — Device: Brand: SMARTABLATE

## (undated) DEVICE — COLUMN DRAPE

## (undated) DEVICE — DEV SUT GRSPR CLOSUR 15CM 14G

## (undated) DEVICE — TIP COVER ACCESSORY

## (undated) DEVICE — Device: Brand: REFERENCE PATCH CARTO 3

## (undated) DEVICE — LOU LITHOTOMY ROBOTIC: Brand: MEDLINE INDUSTRIES, INC.

## (undated) DEVICE — MANIP UTER RUMI TP 6.7MMX8CM BLU

## (undated) DEVICE — Device: Brand: SOUNDSTAR

## (undated) DEVICE — INTRO SWARTZ HEMO SL0 8.5F81CM

## (undated) DEVICE — SUT PDS 2/0 SH 27IN Z317H

## (undated) DEVICE — SUT VIC 0 UR6 27IN VCP603H

## (undated) DEVICE — 3M™ STERI-DRAPE™ INSTRUMENT POUCH 1018L: Brand: STERI-DRAPE™

## (undated) DEVICE — DRSNG WND BORDR/ADHS NONADHR/GZ LF 2X2IN STRL

## (undated) DEVICE — ENDOPATH PNEUMONEEDLE INSUFFLATION NEEDLES WITH LUER LOCK CONNECTORS 120MM: Brand: ENDOPATH

## (undated) DEVICE — BLADELESS OBTURATOR: Brand: WECK VISTA